# Patient Record
Sex: FEMALE | Race: WHITE | NOT HISPANIC OR LATINO | Employment: PART TIME | URBAN - METROPOLITAN AREA
[De-identification: names, ages, dates, MRNs, and addresses within clinical notes are randomized per-mention and may not be internally consistent; named-entity substitution may affect disease eponyms.]

---

## 2017-01-23 ENCOUNTER — DOCUMENTATION ONLY (OUTPATIENT)
Dept: NEUROLOGY | Facility: CLINIC | Age: 42
End: 2017-01-23

## 2017-01-23 ENCOUNTER — HOSPITAL ENCOUNTER (OUTPATIENT)
Facility: CLINIC | Age: 42
Setting detail: OBSERVATION
Discharge: HOME | End: 2017-01-24
Attending: EMERGENCY MEDICINE | Admitting: INTERNAL MEDICINE
Payer: COMMERCIAL

## 2017-01-23 DIAGNOSIS — G25.3 MYOCLONIC JERKING: ICD-10-CM

## 2017-01-23 LAB
ALBUMIN SERPL-MCNC: 3.9 G/DL (ref 3.4–5)
ALBUMIN UR-MCNC: NEGATIVE MG/DL
ALP SERPL-CCNC: 63 U/L (ref 40–150)
ALT SERPL W P-5'-P-CCNC: 19 U/L (ref 0–50)
AMPHETAMINES UR QL SCN: ABNORMAL
ANION GAP SERPL CALCULATED.3IONS-SCNC: 17 MMOL/L (ref 3–14)
APPEARANCE UR: CLEAR
AST SERPL W P-5'-P-CCNC: 18 U/L (ref 0–45)
BACTERIA #/AREA URNS HPF: ABNORMAL /HPF
BARBITURATES UR QL: ABNORMAL
BASOPHILS # BLD AUTO: 0 10E9/L (ref 0–0.2)
BASOPHILS NFR BLD AUTO: 0.2 %
BENZODIAZ UR QL: ABNORMAL
BILIRUB SERPL-MCNC: 0.3 MG/DL (ref 0.2–1.3)
BILIRUB UR QL STRIP: NEGATIVE
BUN SERPL-MCNC: 15 MG/DL (ref 7–30)
CALCIUM SERPL-MCNC: 8.8 MG/DL (ref 8.5–10.1)
CANNABINOIDS UR QL SCN: ABNORMAL
CHLORIDE SERPL-SCNC: 106 MMOL/L (ref 94–109)
CK SERPL-CCNC: 49 U/L (ref 30–225)
CO2 SERPL-SCNC: 16 MMOL/L (ref 20–32)
COCAINE UR QL: ABNORMAL
COLOR UR AUTO: YELLOW
CREAT SERPL-MCNC: 0.84 MG/DL (ref 0.52–1.04)
DIFFERENTIAL METHOD BLD: NORMAL
EOSINOPHIL # BLD AUTO: 0.1 10E9/L (ref 0–0.7)
EOSINOPHIL NFR BLD AUTO: 1.3 %
ERYTHROCYTE [DISTWIDTH] IN BLOOD BY AUTOMATED COUNT: 12.4 % (ref 10–15)
GFR SERPL CREATININE-BSD FRML MDRD: 75 ML/MIN/1.7M2
GLUCOSE SERPL-MCNC: 87 MG/DL (ref 70–99)
GLUCOSE UR STRIP-MCNC: NEGATIVE MG/DL
HCG SERPL QL: NEGATIVE
HCT VFR BLD AUTO: 43 % (ref 35–47)
HGB BLD-MCNC: 14.8 G/DL (ref 11.7–15.7)
HGB UR QL STRIP: NEGATIVE
IMM GRANULOCYTES # BLD: 0 10E9/L (ref 0–0.4)
IMM GRANULOCYTES NFR BLD: 0.5 %
INTERPRETATION ECG - MUSE: NORMAL
KETONES UR STRIP-MCNC: 10 MG/DL
LACTATE BLD-SCNC: 8.9 MMOL/L (ref 0.7–2.1)
LEUKOCYTE ESTERASE UR QL STRIP: NEGATIVE
LYMPHOCYTES # BLD AUTO: 1.7 10E9/L (ref 0.8–5.3)
LYMPHOCYTES NFR BLD AUTO: 29.7 %
MAGNESIUM SERPL-MCNC: 2.2 MG/DL (ref 1.6–2.3)
MCH RBC QN AUTO: 31 PG (ref 26.5–33)
MCHC RBC AUTO-ENTMCNC: 34.4 G/DL (ref 31.5–36.5)
MCV RBC AUTO: 90 FL (ref 78–100)
MONOCYTES # BLD AUTO: 1.3 10E9/L (ref 0–1.3)
MONOCYTES NFR BLD AUTO: 23.7 %
MUCOUS THREADS #/AREA URNS LPF: PRESENT /LPF
NEUTROPHILS # BLD AUTO: 2.5 10E9/L (ref 1.6–8.3)
NEUTROPHILS NFR BLD AUTO: 44.6 %
NITRATE UR QL: NEGATIVE
NRBC # BLD AUTO: 0 10*3/UL
NRBC BLD AUTO-RTO: 0 /100
OPIATES UR QL SCN: ABNORMAL
PCP UR QL SCN: ABNORMAL
PH UR STRIP: 6 PH (ref 5–7)
PLATELET # BLD AUTO: 222 10E9/L (ref 150–450)
POTASSIUM SERPL-SCNC: 3.4 MMOL/L (ref 3.4–5.3)
PROT SERPL-MCNC: 8 G/DL (ref 6.8–8.8)
RBC # BLD AUTO: 4.78 10E12/L (ref 3.8–5.2)
RBC #/AREA URNS AUTO: 1 /HPF (ref 0–2)
SODIUM SERPL-SCNC: 139 MMOL/L (ref 133–144)
SP GR UR STRIP: 1.02 (ref 1–1.03)
SQUAMOUS #/AREA URNS AUTO: 5 /HPF (ref 0–1)
URN SPEC COLLECT METH UR: ABNORMAL
UROBILINOGEN UR STRIP-MCNC: NORMAL MG/DL (ref 0–2)
WBC # BLD AUTO: 5.6 10E9/L (ref 4–11)
WBC #/AREA URNS AUTO: <1 /HPF (ref 0–2)

## 2017-01-23 PROCEDURE — 80307 DRUG TEST PRSMV CHEM ANLYZR: CPT | Performed by: EMERGENCY MEDICINE

## 2017-01-23 PROCEDURE — 40000141 ZZH STATISTIC PERIPHERAL IV START W/O US GUIDANCE

## 2017-01-23 PROCEDURE — 96376 TX/PRO/DX INJ SAME DRUG ADON: CPT

## 2017-01-23 PROCEDURE — 96361 HYDRATE IV INFUSION ADD-ON: CPT

## 2017-01-23 PROCEDURE — 25000128 H RX IP 250 OP 636: Performed by: EMERGENCY MEDICINE

## 2017-01-23 PROCEDURE — 83735 ASSAY OF MAGNESIUM: CPT | Performed by: EMERGENCY MEDICINE

## 2017-01-23 PROCEDURE — 81001 URINALYSIS AUTO W/SCOPE: CPT | Performed by: EMERGENCY MEDICINE

## 2017-01-23 PROCEDURE — 82550 ASSAY OF CK (CPK): CPT | Performed by: EMERGENCY MEDICINE

## 2017-01-23 PROCEDURE — 99285 EMERGENCY DEPT VISIT HI MDM: CPT | Mod: 25

## 2017-01-23 PROCEDURE — 96374 THER/PROPH/DIAG INJ IV PUSH: CPT

## 2017-01-23 PROCEDURE — 82085 ASSAY OF ALDOLASE: CPT | Performed by: EMERGENCY MEDICINE

## 2017-01-23 PROCEDURE — G0378 HOSPITAL OBSERVATION PER HR: HCPCS

## 2017-01-23 PROCEDURE — 83605 ASSAY OF LACTIC ACID: CPT | Performed by: EMERGENCY MEDICINE

## 2017-01-23 PROCEDURE — 25000125 ZZHC RX 250: Performed by: INTERNAL MEDICINE

## 2017-01-23 PROCEDURE — 93005 ELECTROCARDIOGRAM TRACING: CPT

## 2017-01-23 PROCEDURE — 80053 COMPREHEN METABOLIC PANEL: CPT | Performed by: EMERGENCY MEDICINE

## 2017-01-23 PROCEDURE — 85025 COMPLETE CBC W/AUTO DIFF WBC: CPT | Performed by: EMERGENCY MEDICINE

## 2017-01-23 PROCEDURE — 84703 CHORIONIC GONADOTROPIN ASSAY: CPT | Performed by: EMERGENCY MEDICINE

## 2017-01-23 PROCEDURE — 25000125 ZZHC RX 250: Performed by: EMERGENCY MEDICINE

## 2017-01-23 PROCEDURE — 25000132 ZZH RX MED GY IP 250 OP 250 PS 637: Performed by: INTERNAL MEDICINE

## 2017-01-23 PROCEDURE — 99220 ZZC INITIAL OBSERVATION CARE,LEVL III: CPT | Performed by: INTERNAL MEDICINE

## 2017-01-23 RX ORDER — BUPROPION HYDROCHLORIDE 75 MG/1
75 TABLET ORAL 2 TIMES DAILY
Status: DISCONTINUED | OUTPATIENT
Start: 2017-01-23 | End: 2017-01-24 | Stop reason: HOSPADM

## 2017-01-23 RX ORDER — ACETAMINOPHEN 325 MG/1
650 TABLET ORAL EVERY 4 HOURS PRN
Status: DISCONTINUED | OUTPATIENT
Start: 2017-01-23 | End: 2017-01-24 | Stop reason: HOSPADM

## 2017-01-23 RX ORDER — SODIUM CHLORIDE 9 MG/ML
INJECTION, SOLUTION INTRAVENOUS CONTINUOUS
Status: DISCONTINUED | OUTPATIENT
Start: 2017-01-23 | End: 2017-01-24 | Stop reason: HOSPADM

## 2017-01-23 RX ORDER — LORAZEPAM 2 MG/ML
0.5 INJECTION INTRAMUSCULAR ONCE
Status: COMPLETED | OUTPATIENT
Start: 2017-01-23 | End: 2017-01-23

## 2017-01-23 RX ORDER — LIDOCAINE 40 MG/G
CREAM TOPICAL
Status: DISCONTINUED | OUTPATIENT
Start: 2017-01-23 | End: 2017-01-24 | Stop reason: HOSPADM

## 2017-01-23 RX ORDER — LORAZEPAM 2 MG/ML
1 INJECTION INTRAMUSCULAR ONCE
Status: COMPLETED | OUTPATIENT
Start: 2017-01-23 | End: 2017-01-23

## 2017-01-23 RX ORDER — NALOXONE HYDROCHLORIDE 0.4 MG/ML
.1-.4 INJECTION, SOLUTION INTRAMUSCULAR; INTRAVENOUS; SUBCUTANEOUS
Status: DISCONTINUED | OUTPATIENT
Start: 2017-01-23 | End: 2017-01-24 | Stop reason: HOSPADM

## 2017-01-23 RX ORDER — LORAZEPAM 0.5 MG/1
.5-1 TABLET ORAL EVERY 4 HOURS PRN
Status: DISCONTINUED | OUTPATIENT
Start: 2017-01-23 | End: 2017-01-24 | Stop reason: HOSPADM

## 2017-01-23 RX ADMIN — LORAZEPAM 0.5 MG: 2 INJECTION INTRAMUSCULAR; INTRAVENOUS at 11:17

## 2017-01-23 RX ADMIN — SODIUM CHLORIDE 1000 ML: 9 INJECTION, SOLUTION INTRAVENOUS at 11:04

## 2017-01-23 RX ADMIN — LORAZEPAM 0.5 MG: 2 INJECTION INTRAMUSCULAR; INTRAVENOUS at 10:54

## 2017-01-23 RX ADMIN — ACETAMINOPHEN 650 MG: 325 TABLET, FILM COATED ORAL at 18:08

## 2017-01-23 RX ADMIN — LORAZEPAM 0.5 MG: 0.5 TABLET ORAL at 18:08

## 2017-01-23 RX ADMIN — LORAZEPAM 0.5 MG: 2 INJECTION INTRAMUSCULAR; INTRAVENOUS at 12:09

## 2017-01-23 RX ADMIN — LORAZEPAM 1 MG: 2 INJECTION INTRAMUSCULAR; INTRAVENOUS at 10:40

## 2017-01-23 RX ADMIN — LIDOCAINE: 40 CREAM TOPICAL at 14:19

## 2017-01-23 ASSESSMENT — ENCOUNTER SYMPTOMS
LIGHT-HEADEDNESS: 1
DIARRHEA: 0
TREMORS: 1
ABDOMINAL PAIN: 0
COUGH: 0
NAUSEA: 0
SHORTNESS OF BREATH: 0
HEADACHES: 0
FEVER: 0
VOMITING: 0

## 2017-01-23 ASSESSMENT — VISUAL ACUITY
OU: DOUBLE VISION/DIPLOPIA

## 2017-01-23 NOTE — CONSULTS
REQUESTING PHYSICIAN:  None stated.       CHIEF COMPLAINT:  Evaluate for body tremors, rule out seizures.      HISTORY OF PRESENT ILLNESS:  Ms. Dae Castañeda is a 41-year-old right-handed lady who presents for uncontrollable body movements which started to occur today.  This is not the first time she has had symptoms like that.  Over the last few days she had cold and sore throat and low-grade fever.  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  She now continues to have spells.  She is awake and talking to me as she is having intermittent body shivering including more of her legs and occasionally arms and occasionally movements of her head.      She states that she does not lose consciousness and does not become disoriented, does not bite her tongue.      Previously she had similar spells when she was working out.  Then she was taking Paxil.  She had been admitted to our hospital in 05/2015 and had EEG video monitoring for the same symptoms and did appear to have nonepileptic spells.  She had an MRI of the brain in 2015 which, as well, has been normal.  She had been seen by neurologist, Dr. Ramsey Hines, who recommended that she would have drawing of her CPK and aldolase if she had spells like that in the future.  There was never a diagnosis of seizure disorder.  Looking at evaluation in Neurology in our hospital in 05/2015, nonepileptic spells where seen.  The patient essentially is having the same symptoms.  Today she is slightly hypotensive, afebrile and she does have a slightly elevated lactate.      CURRENT MEDICATIONS:  Wellbutrin 75 mg twice a day.        SOCIAL HISTORY:  Nondrinker, nonsmoker.  She is a school paraprofessional.        FAMILY HISTORY:  Noncontributory to this presentation.      ALLERGIES:  Codeine, venlafaxine and Paxil.      REVIEW OF  ORGANS AND SYSTEMS:  Positive for fibromyalgia and symptoms of depression.  She does take bupropion for that.      Review of organs and systems shows for neck pain and migrating body pain related to fibromyalgia.      CURRENT PHYSICAL EXAMINATION:   VITAL SIGNS:  Show a blood pressure of 95/65, respiratory rate is 12, heart rate of 105-112.   GENERAL:  The patient is in no acute distress, but she does have intermittent body tremors.   NEUROLOGIC:  She is awake and alert x3 with clear speech and language function.  Mental status is intact.  Motor examination shows symmetrical bulk and tone with no evidence of focal weakness.  Reflexes are symmetrical.  Toes are downgoing.  Sensory examination is intact to pinprick and vibration.  Coordination is intact to finger-nose-finger.      IMPRESSION:  This lady presents with episodes of body tremors which appeared to be intentional.  I do not see any evidence of seizures.  This is a recurrent problem that had been happening before.  The presentation clinically highly suggests anxiety, panic attack or nonepileptic spells.  Neurologically, I will do CPK and aldolase which was mentioned by Dr. Ramsey Hines who patient sees in outpatient Neurology.  We most likely will see elevation of CPK, as patient having active body movements.      I do recommend that the patient will receive Ativan, per dosing by the Hospitalist.  She will have increase in blood pressure with IV hydration, and I do recommend Psychiatry consult.      After review of the chart and clinical history, this is not presentation due to neurological condition, and I would recommend mental health providers to be involved here.  I discussed all the above with the patient and her .  I will plan to follow on the lab results.         CARINA HILTON MD             D: 01/23/2017 14:36   T: 01/23/2017 15:29   MT: HARDEEP      Name:     GARDENIA WISEMAN   MRN:      [id number]        Account:        [id number]   :      1975           Consult Date:  2017      Document: [id number]

## 2017-01-23 NOTE — CONSULTS
Note is dictated.    Pt. Presents with intentional body shaking which happened many times before with different reasons like working out, taking Paxil.    She recently had a clod and now she has low BP.    I personally observed episodes - they are intentions.    I do not see Sz.    Pt. Previously had nl EEG.    I rec to cancel EEG, consider hydration and psychiatry consult to work on anxiety.    Ativan can help now.

## 2017-01-23 NOTE — IP AVS SNAPSHOT
"    [facility]: [phone]                                              INTERAGENCY TRANSFER FORM - LAB / IMAGING / EKG / EMG RESULTS   2017                    Hospital Admission Date: 2017  GARDENIA YEE   : 1975  Sex: Female        Attending Provider: Nataliya Cooper MD     Allergies:  Codeine, Paroxetine, Venlafaxine    Infection:  None   Service:  HOSPITALIST    Ht:  1.626 m (5' 4\")   Wt:  --   Admission Wt:  --    BMI:  --   BSA:  --            Patient PCP Information     Provider PCP Type    Pepe Issa MD General         Lab Results - 3 Days (17 - 17)      CK total [[id number]]  Resulted: 17, Result status: Final result    Ordering provider: Jody Radford MD  17 0000 Resulting lab: [facility]    Specimen Information    Type Source Collected On   Blood  17 0740          Components       Value Reference Range Flag Lab   CK Total 78 30 - 225 U/L  FrStHsLb            Basic metabolic panel [[id number]] (Abnormal)  Resulted: 17 0816, Result status: Final result    Ordering provider: Nataliya Cooper MD  17 0000 Resulting lab: [facility]    Specimen Information    Type Source Collected On   Blood  17 0740          Components       Value Reference Range Flag Lab   Sodium 141 133 - 144 mmol/L  FrStHsLb   Potassium 3.7 3.4 - 5.3 mmol/L  FrStHsLb   Chloride 110 94 - 109 mmol/L H FrStHsLb   Carbon Dioxide 21 20 - 32 mmol/L  FrStHsLb   Anion Gap 10 3 - 14 mmol/L  FrStHsLb   Glucose 79 70 - 99 mg/dL  FrStHsLb   Urea Nitrogen 8 7 - 30 mg/dL  FrStHsLb   Creatinine 0.71 0.52 - 1.04 mg/dL  FrStHsLb   GFR Estimate -- >60 mL/min/1.7m2  FrStHsLb   Result:         >90  Non  GFR Calc     GFR Estimate If Black -- >60 mL/min/1.7m2  FrStHsLb   Result:         >90   GFR Calc     Calcium 8.0 8.5 - 10.1 mg/dL L FrStHsLb   Result:            "   Lactic acid whole blood [[id number]] (Abnormal)  Resulted: 01/24/17 0807, Result status: Final result    Ordering provider: Nataliya Cooper MD  01/24/17 0000 Resulting lab: [facility]    Specimen Information    Type Source Collected On   Blood  01/24/17 0740          Components       Value Reference Range Flag Lab   Lactic Acid 0.5 0.7 - 2.1 mmol/L L FrStHsLb            Aldolase [[id number]]  Resulted: 01/24/17 0751, Result status: In process    Ordering provider: Jody Radford MD  01/24/17 0000 Resulting lab: [facility]    Specimen Information    Type Source Collected On   Blood  01/24/17 0740            Drug abuse screen urine [[id number]] (Abnormal)  Resulted: 01/23/17 1841, Result status: Final result    Ordering provider: Martin Ybarra MD  01/23/17 1044 Resulting lab: [facility]    Specimen Information    Type Source Collected On   Urine Urine clean catch 01/23/17 1800          Components       Value Reference Range Flag Lab   Amphetamine Qual Urine -- NEG   FrStHsLb   Result:         Negative   Cutoff for a negative amphetamine is 500 ng/mL or less.     Barbiturates Qual Urine -- NEG   FrStHsLb   Result:         Negative   Cutoff for a negative barbiturate is 200 ng/mL or less.     Benzodiazepine Qual Urine -- NEG  A FrStHsLb   Result:         Positive   Cutoff for a positive benzodiazepine is greater than 200 ng/mL. This is an   unconfirmed screening result to be used for medical purposes only.     Cannabinoids Qual Urine -- NEG   FrStHsLb   Result:         Negative   Cutoff for a negative cannabinoid is 50 ng/mL or less.     Cocaine Qual Urine -- NEG   FrStHsLb   Result:         Negative   Cutoff for a negative cocaine is 300 ng/mL or less.     Opiates Qualitative Urine -- NEG   FrStHsLb   Result:         Negative   Cutoff for a negative opiate is 300 ng/mL or less.     PCP Qual Urine -- NEG   FrStHsLb   Result:         Negative   Cutoff for a negative PCP is 25  ng/mL or less.              UA with Microscopic [[id number]] (Abnormal)  Resulted: 01/23/17 1830, Result status: Final result    Ordering provider: Martin Ybarra MD  01/23/17 1044 Resulting lab: [facility]    Specimen Information    Type Source Collected On   Urine Urine clean catch 01/23/17 1800          Components       Value Reference Range Flag Lab   Color Urine Yellow   FrStHsLb   Appearance Urine Clear   FrStHsLb   Glucose Urine Negative NEG mg/dL  FrStHsLb   Bilirubin Urine Negative NEG   FrStHsLb   Ketones Urine 10 NEG mg/dL A FrStHsLb   Specific Harmony Urine 1.018 1.003 - 1.035   FrStHsLb   Blood Urine Negative NEG   FrStHsLb   pH Urine 6.0 5.0 - 7.0 pH  FrStHsLb   Protein Albumin Urine Negative NEG mg/dL  FrStHsLb   Urobilinogen mg/dL Normal 0.0 - 2.0 mg/dL  FrStHsLb   Nitrite Urine Negative NEG   FrStHsLb   Leukocyte Esterase Urine Negative NEG   FrStHsLb   Source Midstream Urine   FrStHsLb   WBC Urine <1 0 - 2 /HPF  FrStHsLb   RBC Urine 1 0 - 2 /HPF  FrStHsLb   Bacteria Urine Few NEG /HPF A FrStHsLb   Squamous Epithelial /HPF Urine 5 0 - 1 /HPF H FrStHsLb   Mucous Urine Present NEG /LPF A FrStHsLb            Aldolase [[id number]]  Resulted: 01/23/17 1633, Result status: In process    Ordering provider: Martin Ybarra MD  01/23/17 1048 Resulting lab: [facility]    Specimen Information    Type Source Collected On     01/23/17 1048            CBC with platelets + differential [[id number]]  Resulted: 01/23/17 1148, Result status: Final result    Ordering provider: Martin Ybarra MD  01/23/17 1044 Resulting lab: [facility]    Specimen Information    Type Source Collected On   Blood  01/23/17 1048          Components       Value Reference Range Flag Lab   WBC 5.6 4.0 - 11.0 10e9/L  FrStHsLb   RBC Count 4.78 3.8 - 5.2 10e12/L  FrStHsLb   Hemoglobin 14.8 11.7 - 15.7 g/dL  FrStHsLb   Hematocrit 43.0 35.0 - 47.0 %  FrStHsLb   MCV 90 78 - 100 fl  FrStHsLb   MCH 31.0 26.5 -  33.0 pg  FrStHsLb   MCHC 34.4 31.5 - 36.5 g/dL  FrStHsLb   RDW 12.4 10.0 - 15.0 %  FrStHsLb   Platelet Count 222 150 - 450 10e9/L  FrStHsLb   Diff Method Automated Method   FrStHsLb   % Neutrophils 44.6 %  FrStHsLb   % Lymphocytes 29.7 %  FrStHsLb   % Monocytes 23.7 %  FrStHsLb   % Eosinophils 1.3 %  FrStHsLb   % Basophils 0.2 %  FrStHsLb   % Immature Granulocytes 0.5 %  FrStHsLb   Nucleated RBCs 0 0 /100  FrStHsLb   Absolute Neutrophil 2.5 1.6 - 8.3 10e9/L  FrStHsLb   Absolute Lymphocytes 1.7 0.8 - 5.3 10e9/L  FrStHsLb   Absolute Monocytes 1.3 0.0 - 1.3 10e9/L  FrStHsLb   Absolute Eosinophils 0.1 0.0 - 0.7 10e9/L  FrStHsLb   Absolute Basophils 0.0 0.0 - 0.2 10e9/L  FrStHsLb   Abs Immature Granulocytes 0.0 0 - 0.4 10e9/L  FrStHsLb   Absolute Nucleated RBC 0.0   FrStHsLb            Comprehensive metabolic panel [[id number]] (Abnormal)  Resulted: 01/23/17 1117, Result status: Final result    Ordering provider: Martin Ybarra MD  01/23/17 1044 Resulting lab: [facility]    Specimen Information    Type Source Collected On   Blood  01/23/17 1048          Components       Value Reference Range Flag Lab   Sodium 139 133 - 144 mmol/L  FrStHsLb   Potassium 3.4 3.4 - 5.3 mmol/L  FrStHsLb   Chloride 106 94 - 109 mmol/L  FrStHsLb   Carbon Dioxide 16 20 - 32 mmol/L L FrStHsLb   Anion Gap 17 3 - 14 mmol/L H FrStHsLb   Glucose 87 70 - 99 mg/dL  FrStHsLb   Urea Nitrogen 15 7 - 30 mg/dL  FrStHsLb   Creatinine 0.84 0.52 - 1.04 mg/dL  FrStHsLb   GFR Estimate 75 >60 mL/min/1.7m2  FrStHsLb   Comment:  Non  GFR Calc   GFR Estimate If Black -- >60 mL/min/1.7m2  FrStHsLb   Result:         >90   GFR Calc     Calcium 8.8 8.5 - 10.1 mg/dL  FrStHsLb   Result:     Bilirubin Total 0.3 0.2 - 1.3 mg/dL  FrStHsLb   Albumin 3.9 3.4 - 5.0 g/dL  FrStHsLb   Protein Total 8.0 6.8 - 8.8 g/dL  FrStHsLb   Alkaline Phosphatase 63 40 - 150 U/L  FrStHsLb   ALT 19 0 - 50 U/L  FrStHsLb   AST 18 0 - 45 U/L   FrStHsLb            CK total [[id number]]  Resulted: 01/23/17 1117, Result status: Final result    Ordering provider: Martin Ybarra MD  01/23/17 1044 Resulting lab: [facility]    Specimen Information    Type Source Collected On   Blood  01/23/17 1048          Components       Value Reference Range Flag Lab   CK Total 49 30 - 225 U/L  FrStHsLb            Magnesium [[id number]]  Resulted: 01/23/17 1115, Result status: Final result    Ordering provider: Martin Ybarra MD  01/23/17 1049 Resulting lab: [facility]    Specimen Information    Type Source Collected On   Blood  01/23/17 1048          Components       Value Reference Range Flag Lab   Magnesium 2.2 1.6 - 2.3 mg/dL  FrStHsLb            HCG QUALitative pregnancy (blood) [[id number]]  Resulted: 01/23/17 1109, Result status: Final result    Ordering provider: Martin Ybarra MD  01/23/17 1044 Resulting lab: [facility]    Specimen Information    Type Source Collected On   Blood  01/23/17 1048          Components       Value Reference Range Flag Lab   HCG Qualitative Serum Negative NEG   FrStHsLb            Lactic acid [[id number]] (Abnormal)  Resulted: 01/23/17 1058, Result status: Final result    Ordering provider: Martin Ybarra MD  01/23/17 1047 Resulting lab: [facility]    Specimen Information    Type Source Collected On   Blood  01/23/17 1048          Components       Value Reference Range Flag Lab   Lactic Acid 8.9 0.7 - 2.1 mmol/L  FrStHsLb   Comment:         Critical Value called to and read back by   IN ER AT 1057 HBS              Testing Performed By     Lab - Abbreviation Name Director Address Valid Date Range    14 - FrStHsLb [facility] Unknown [address] [place] [address] 05/08/15 1057 - Present    45 - [facility] Unknown Unknown 01/28/02 0000 - Present            Unresulted Labs     None      Encounter-Level Documents:     There  are no encounter-level documents.      Order-Level Documents:     There are no order-level documents.

## 2017-01-23 NOTE — PHARMACY-ADMISSION MEDICATION HISTORY
Admission medication history interview status for the 1/23/2017  admission is complete. See EPIC admission navigator for prior to admission medications     Medication history source reliability:Good    Actions taken by pharmacist (provider contacted, etc):interviewed pt w/     Additional medication history information not noted on PTA med list :None    Medication reconciliation/reorder completed by provider prior to medication history? No    Time spent in this activity: 10 min    Prior to Admission medications    Medication Sig Last Dose Taking? Auth Provider   IBUPROFEN PO Take 400-800 mg by mouth daily as needed for moderate pain or fever   Yes Unknown, Entered By History   tiZANidine (ZANAFLEX) 2 MG tablet Take 1 tablet (2 mg) by mouth 2 times daily as needed for muscle spasms Past Month at Unknown time Yes Pepe Issa MD   buPROPion (WELLBUTRIN) 75 MG tablet TAKE 1 TABLET (75 MG) BY MOUTH 2 TIMES DAILY 1/23/2017 at am Yes Pepe Issa MD

## 2017-01-23 NOTE — LETTER
Transition Communication Hand-off for Care Transitions to Next Level of Care Provider    Name: Dae Castañeda  MRN #: [id number]  Primary Care Provider: Pepe Issa     Primary Clinic: [facility] [place] [address] [place] [address] [address]  [place] [address]     Reason for Hospitalization:  Myoclonic jerking [G25.3]  Admit Date/Time: 1/23/2017 10:39 AM  Discharge Date: 1/24/2017    Payor Source: Payor: MEDICA / Plan: MEDICA CHOICE [place] CARE / Product Type: HMO /       Plan of Care Goals/Milestone Events:   Patient Concerns: admitted with nonepileptic spells.    Patient Goals:   Short-term return back to baseline.             Reason for Communication Hand-off Referral: Multiple providers/specialties  Non-adherence to plan of care related to:  Other doesn't want to follow up with psych.    Discharge Plan:       Concern for non-adherence with plan of care:   Y/N yes  Discharge Needs Assessment:        Follow-up plan:  pt wanted to make her own appt.            Key Recommendations:  Pt admitted with nonepileptic spells. Psych consulted. Pt upset about psych consult. Pt adamant about making own appt. Please follow up with pt in a few days.     Ella Soria    AVS/Discharge Summary is the source of truth; this is a helpful guide for improved communication of patient story

## 2017-01-23 NOTE — IP AVS SNAPSHOT
MRN:[id number]                      After Visit Summary   1/23/2017    Dae Castañeda    MRN: [id number]           Thank you!     Thank you for choosing [facility] for your care. Our goal is always to provide you with excellent care. Hearing back from our patients is one way we can continue to improve our services. Please take a few minutes to complete the written survey that you may receive in the mail after you visit with us. Thank you!        Patient Information     Date Of Birth          1975        About your hospital stay     You were admitted on:  January 23, 2017 You last received care in the:  [facility] [address]    You were discharged on:  January 24, 2017        Reason for your hospital stay       Involuntary movement of extremities                  Who to Call     For medical emergencies, please call 911.  For non-urgent questions about your medical care, please call your primary care provider or clinic, [phone]          Attending Provider     Provider    Martin Ybarra MD Lindeke, Elizabeth A, MD       Primary Care Provider Office Phone # Fax #    Pepe Issa [phone]       [facility] [place] [address]  [place] [address]        After Care Instructions     Activity       Your activity upon discharge: activity as tolerated            Diet       Follow this diet upon discharge: Orders Placed This Encounter  Regular Diet Adult                  Follow-up Appointments     Follow-up and recommended labs and tests        Follow up with primary care provider, Pepe Issa, within 7 days for hospital follow- up.  No follow up labs or test are needed.  F/U with your regular neurologist and psychaitrist next available                  Pending Results     Date and Time Order Name Status Description    1/24/2017 0000 Aldolase In process     1/23/2017 1048 Aldolase In process             Statement of  "Approval     Ordered          01/24/17 1704  I have reviewed and agree with all the recommendations and orders detailed in this document.   EFFECTIVE NOW     Approved and electronically signed by:  Radha Cotter MD             Admission Information        Provider Department Dept Phone    1/23/2017 Nataliya Cooper MD  [facility] [phone]      Your Vitals Were     Blood Pressure Pulse Temperature Respirations Height Pulse Oximetry    90/57 mmHg 83 98  F (36.7  C) (Oral) 16 1.626 m (5' 4\") 97%    Last Period                   01/09/2017           MyChart Information     [employer] gives you secure access to your electronic health record. If you see a primary care provider, you can also send messages to your care team and make appointments. If you have questions, please call your primary care clinic.  If you do not have a primary care provider, please call [phone] and they will assist you.        Care EveryWhere ID     This is your Care EveryWhere ID. This could be used by other organizations to access your [facility] medical records  [id number]           Review of your medicines      CONTINUE these medicines which have NOT CHANGED        Dose / Directions    buPROPion 75 MG tablet   Commonly known as:  WELLBUTRIN   Used for:  Major depressive disorder, single episode, mild (H)        TAKE 1 TABLET (75 MG) BY MOUTH 2 TIMES DAILY   Quantity:  180 tablet   Refills:  1       IBUPROFEN PO        Dose:  400-800 mg   Take 400-800 mg by mouth daily as needed for moderate pain or fever   Refills:  0       tiZANidine 2 MG tablet   Commonly known as:  ZANAFLEX   Used for:  Acute midline thoracic back pain        Dose:  2 mg   Take 1 tablet (2 mg) by mouth 2 times daily as needed for muscle spasms   Quantity:  30 tablet   Refills:  0                Protect others around you: Learn how to safely use, store and throw away your medicines at www.disposemymeds.org.             Medication List: This is a list of " all your medications and when to take them. Check marks below indicate your daily home schedule. Keep this list as a reference.      Medications           Morning Afternoon Evening Bedtime As Needed    buPROPion 75 MG tablet   Commonly known as:  WELLBUTRIN   TAKE 1 TABLET (75 MG) BY MOUTH 2 TIMES DAILY   Last time this was given:  75 mg on 1/24/2017  9:27 AM   Next Dose Due:  This evening 1/24/17                                      IBUPROFEN PO   Take 400-800 mg by mouth daily as needed for moderate pain or fever   Next Dose Due:  Resume as taken prior to hospitalization                                   tiZANidine 2 MG tablet   Commonly known as:  ZANAFLEX   Take 1 tablet (2 mg) by mouth 2 times daily as needed for muscle spasms   Next Dose Due:  Resume as taken prior to hospitalization

## 2017-01-23 NOTE — ED NOTES
[facility]  ED Nurse Handoff Report    ED Chief complaint: Shaking      ED Diagnosis:   Final diagnoses:   Myoclonic jerking       Code Status: Full Code    Allergies:   Allergies   Allergen Reactions     Codeine GI Disturbance     Paroxetine      serotonin syndrome, all seratonin drugs     Venlafaxine      serotonin syndrome       Activity level:  Independent     Needed?: No    Isolation: No  Infection: Not Applicable    Bariatric?: No      Vital Signs:   Filed Vitals:    01/23/17 1200 01/23/17 1215 01/23/17 1230 01/23/17 1245   BP: 105/60 95/59 102/52 92/69   Pulse:       Temp:       TempSrc:       Resp:       Height:       SpO2: 95% 94% 95% 94%       Cardiac Rhythm: ,        Pain level:      Is this patient confused?: No    Patient Report: Initial Complaint: Serotonin syndrome was brought in by EMS shacking; she was at work and felt faint and developed shaking in legs   Focused Assessment: pt shaking kicking and swing arms. Pt was able to speak  Tests Performed: blood CT  Abnormal Results: no; pt has had these symptoms before; neuro has follow up   Treatments provided: observation and ativan    Family Comments:  @ bedside    OBS brochure/video discussed/provided to patient: Yes    ED Medications:   Medications   0.9% sodium chloride BOLUS (not administered)   LORazepam (ATIVAN) injection 1 mg (1 mg Intravenous Given 1/23/17 1040)   LORazepam (ATIVAN) injection 0.5 mg (0.5 mg Intravenous Given 1/23/17 1054)   0.9% sodium chloride BOLUS (0 mLs Intravenous Stopped 1/23/17 1258)   LORazepam (ATIVAN) injection 0.5 mg (0.5 mg Intravenous Given 1/23/17 1117)   LORazepam (ATIVAN) injection 0.5 mg (0.5 mg Intravenous Given 1/23/17 1209)       Drips infusing?:  No      ED NURSE PHONE NUMBER: [phone]

## 2017-01-23 NOTE — ED PROVIDER NOTES
History     Chief Complaint:  Convulsions     HPI   Niomi P Haroon Castañeda is a 41 year old female with a medical history significant for undiagnosed Seratonin Syndrome and fibromyalgia, who presents after getting over a cold this weekend and awaking this morning slightly lightheaded. She subsequently went to work and continued to feel lightheaded and acutely became faint. Approximately 30 seconds after feeling faint she developed severe spasms in her legs. Soon afterwards she developed whole body convulsions and hyperventilation. She was slowly lowered to the ground during this time and 911 was called. While en route to the emergency department the patient received Versed. On evaluation, the patient states that she generally has an arm twitch at night but her symptoms today are much more severe than usual. To note, the patient was last seen by her neurologist a couple months ago and states that she developed these symptoms after discontinuing Paxil cold turkey. The patient does not report any head injury, loss of consciousness, diarrhea, headache, nausea, vomiting, or other related symptoms. She voices no other concerns at this time.     Allergies:  Codeine   Paroxetine   Venlafaxine       Medications:    Wellbutrin  Zanaflex   Differin       Past Medical History:    Fibromyalgia   Depressive disorder   Serotonin syndrome      Past Surgical History:    Repair nasal septum   Aspiration and curettage   Laparoscopy      Family History:    Hyperlipidemia (mother)  Diabetes and CAD (maternal grandmother)  Cancer (paternal grandmother)    Social History:  The patient is not a smoker. The patient uses alcohol.   Marital Status:   [2]     Review of Systems   Constitutional: Negative for fever.   Respiratory: Negative for cough and shortness of breath.    Cardiovascular: Negative for chest pain.   Gastrointestinal: Negative for nausea, vomiting, abdominal pain and diarrhea.   Neurological: Positive for tremors  "and light-headedness. Negative for syncope and headaches.   All other systems reviewed and are negative.      Physical Exam     Patient Vitals for the past 24 hrs:   BP Temp Temp src Pulse Resp SpO2 Height   01/23/17 1118 (!) 79/56 mmHg - - - - 97 % -   01/23/17 1115 (!) 81/55 mmHg - - - - 90 % -   01/23/17 1105 - - - - - 94 % -   01/23/17 1100 98/52 mmHg - - 105 16 94 % -   01/23/17 1054 - - - - - 97 % -   01/23/17 1050 - - - - - 98 % -   01/23/17 1045 96/63 mmHg - - 112 - 98 % -   01/23/17 1042 105/63 mmHg 98.2  F (36.8  C) Oral 111 22 97 % 1.626 m (5' 4\")   01/23/17 1041 105/63 mmHg - - - - - -           Physical Exam  General: Appears well-developed and well-nourished.   Head: No signs of trauma.   Mouth/Throat: Oropharynx is clear and moist.   Eyes: Conjunctivae are normal. Pupils are equal, round, and reactive to light.   Neck: Normal range of motion. No nuchal rigidity. No cervical adenopathy  CV: Mild tachycardia and regular rhythm.    Resp: Effort normal and breath sounds normal. No respiratory distress.   GI: Soft. There is no tenderness or guarding.  Normal bowel sounds.  No CVA tenderness.  MSK: Normal range of motion. no edema. No Calf tenderness.  Neuro: The patient is alert and oriented to person, place, and time.  PERRLA, EOMI, strength in upper/lower extremities normal and symmetrical.   Sensation normal. Speech normal.  Intermittent significant jerking of extremities and head.    GCS 15  Skin: Skin is warm and dry. No rash noted.   Psych: normal mood and affect. behavior is normal.       Emergency Department Course   ECG (11:05:53):  Rate 103 bpm. KY interval 122. QRS duration 74. QT/QTc 382/500. P-R-T axes 64/78/61. Sinus tachycardia nonspecific T wave abnormality. Interpreted at 1109 by Martin Ybarra MD.     Laboratory:  CBC: WNL (WBC 5.6, HGB 14.8, )   CMP: WNL (Creatinine 0.84)  CK: 49 (WNL)  HCG qualitative: negative   Lactic acid: 8.9 (HH)  Magnesium: 2.2 " (WNL)    Interventions:  1158 Ativan 0.5 mg IV   1118 Ativan 0.5 mg IV   1054 Ativan 1.0 mg IV   1054 Ativan 0.5 mg IV   Normal Saline Bolus 1L    Emergency Department Course:  Past medical records, nursing notes, and vitals reviewed. I performed an exam of the patient and obtained history, as documented above. Blood was obtained. A peripheral IV was established.     1213: I spoke with Dr. Jasmine of Neurology who recommended the patient be admitted for observation    1231: I spoke with Dr. Cooper of the hospitalist service     Findings and plan explained to the Patient who consents to admission. Discussed the patient with Dr. Cooper, who will admit the patient to a obs bed for further monitoring, evaluation, and treatment.   Impression & Plan      Medical Decision Making:  Dae Castañeda is a 41 year old female who presents due to episodes of jerking. She has had this sporadically over a number of years. At one point she had been told it could be serotonin syndrome but it had thought to not be related to that. During my evaluation, she did have repeated episodes of jerking. She remained alert and conversing throughout this and it does not appear consistent with a seizure activity. In some ways the movements seem almost volitional.  With deep breathing the symptoms would improve. The patient was given 2.5 mg of Ativan and had overall improvement in her symptoms as well. Basic blood work was obtained and non concerning. Lactic acid did come back significantly elevated although this would be expected with the repeated flexion and jerking of the muscles. I do not believe that she is septic at this time. The patient did have a note from her Neurologist requesting a CK be obtained which came back normal. There was another test that was requested as well that was difficult to make out based on the handwriting but was though to potentially be aldolase which was ordered as well. I did speak with Dr. Jasmine of  the neurology service who was familiar of the patient who recommended admission for further monitoring. The patient was admitted to Dr. Hendrix of the hospitalist service.     Diagnosis:    ICD-10-CM    1. Myoclonic jerking G25.3         Disposition:  Admitted to the hospitalist service under an obs bed    Erin ESTEVEZ, am serving as a scribe at 10:40 AM on 1/23/2017 to document services personally performed by Martin Ybarra MD based on my observations and the provider's statements to me.            Martin Ybarra MD  01/23/17 5420

## 2017-01-23 NOTE — H&P
"PRIMARY CARE PHYSICIAN:  Dr. Issa.      PRIMARY NEUROLOGIST:  Dr. Mustafa.      CHIEF COMPLAINT:  Myoclonic jerks.      HISTORY OF PRESENT ILLNESS:  Dae Castañeda is a 41-year-old female with a past medical history of depression and serotonin syndrome.  Her serotonin syndrome presented when she was 21 years old with involuntary movements.  The patient says that she has had multiple episodes of serotonin syndrome.  This can be caused by medications that affected her serotonin levels.  She has also had numerous admissions for myoclonic jerks, the last one being in 05/2015.  During that admission, the patient had a negative EEG for epileptiform activities.  She also had an MRI of the brain with and without contrast so was a normal study.  The patient has followed up with Dr. Mustafa from [facility].  The cause of the patient's myoclonic jerks is unknown.  In the past she did notice that she had it more when she had exercise.  She also has had an episode of serotonin syndrome according to the patient when she had stopped her Paxil \"cold turkey.\"      The patient says that she has had a sinus infection over the weekend, Friday night or 3 days prior to admission, the patient started having her sinus symptoms and Saturday she was in bed most of the day with decreased p.o. intake and then yesterday or Sunday, the patient had increase of her appetite and felt better.  She went to work this morning and then after a little bit at work, she felt lightheaded.  She went out into the hallway and then felt like she was going to faint and she started having some myotonic jerks and because of this, she was brought to the emergency room by ambulance.  In the emergency room, the patient has had some Ambien, which has helped.  The patient has had an elevated lactic acid that this also was elevated when she was initially seen for her last hospitalization in 05/2015 and this resolved with fluids.  Her lactic " acid was 8.9.      The patient was sent up to the Neurology floor under observation.  The patient has already been seen by Dr. Radford who recommends no EEG and recommends Ambien and Psychiatry to help the patient with her anxiety.  Of note, the patient has not lost consciousness.  She does not have any bowel or bladder incontinence.  She is accompanied by her .      PAST MEDICAL HISTORY:   1.  History of serotonin syndrome.  Typically this has been medication related involved seizure activity and involuntary muscle jerking.  First happened when she was 21.  She was recently admitted in 05/2015 with myoclonic jerking with a negative neurological workup.   2.  History of mild depression.   3.  History of myalgia and myositis.   4.  History of deviated septum.   5.  History of female infertility.      PAST SURGICAL HISTORY:   1.  History of repair of a nasal septum.   2.  History of aspiration curettage.   3.  History of laparoscopic drainage of intra-abdominal hematoma, status post ruptured right ovarian cyst.      FAMILY HISTORY:  Positive for depression, hypertension, coronary artery disease in her father.  He had a MI at age 64.  Mother has hyperlipidemia, hypothyroidism, breast cancer.      SOCIAL HISTORY:  The patient is .  She and her  have 2 children, age 7 and 12.  She does not smoke.  She drinks alcohol very seldomly.  She has not used illicit drugs or street drugs.  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      PRIOR TO ADMISSION MEDICATIONS:  Wellbutrin 75 mg p.o. daily, Ibuprofen 400-800 mg daily p.r.n., Zanaflex 2 mg b.i.d. for muscle spasms.  The patient also takes melatonin occasionally for sleep and did take on Saturday and Sunday night a Benadryl and melatonin due to her sinus congestion.      REVIEW OF SYSTEMS:  Complete review of systems is negative other than listed in History of Present Illness.        PHYSICAL EXAMINATION:   GENERAL:  Anxious  appearing female who appears pathetic appearing.   VITAL SIGNS:  Her blood pressure was 95/65, pulse of 105, respiratory rate 16, temperature of 98.2.   HEENT:  Pupils are equal.  Extraocular movements are intact.  Pharynx without erythema.   NECK:  Supple.   CHEST:  Clear to auscultation.   CARDIOVASCULAR:  Regular rate and rhythm, normal S1, S2.   ABDOMEN:  Positive bowel sounds, soft and nontender.   EXTREMITIES:  No edema.   NEUROLOGIC:  The patient does have occasional shaking of her lower extremities bilaterally.  She does not lose consciousness with this.  These occur and they wax and wane during the time that she was interviewed.      LABORATORY DATA:  Sodium 139, potassium 3.4, chloride 106, bicarbonate 16, BUN 15, creatinine 0.84.  Her LFTs are within normal limits.  HCG was negative.  Lactic acid 8.9, glucose 87, white count 5.6, hemoglobin 14.8, platelet count 222,000.  EKG shows sinus tachycardia.      ASSESSMENT AND PLAN:  Dae Castañeda is a 41-year-old female with a long history of myoclonic jerks.  She had been admitted in 05/2015 with very similar symptoms, although at that time it was precipitated by exercise.  She does not exercised like this anymore, although [place] having symptoms.  When she tries to work out at the gym these exacerbate her symptoms.  The patient also has a history of serotonin syndrome.  This first occurred when she was 21 years old.  The patient is now admitted with myoclonic jerks.  Initially was going to have Neurology see the patient and have an EEG done and also have Urology advise on the MRI, however, Neurology has already seen the patient and recommends no EEG and have Psychiatry see the patient and consider some Ativan.   1.  Myoclonic jerks.  The patient will be admitted on observation status, but to the neuro floor.  Will have neuro checks.  Given Dr. Radford's consult we will have psych see the patient  and will also use some oral Ativan.  Would like to hydrate the patient given her elevated lactic acid and repeat this in the morning.   2.  Lactic acidosis which is probably secondary to her myoclonic jerks.  I expect that as these improve the patient's lactic acid will decrease.  This is probably due to muscle.  We will write for this to be repeated in the morning.     3.  Deep venous thrombosis prophylaxis.  The patient will be on SCDs.      CODE STATUS:  Full code.      DISPOSITION:  The patient will be admitted to neuro floor but under observation status.         WENDIE GREY MD             D: 2017 15:38   T: 2017 16:29   MT:       Name:     GARDENIA WISEMAN   MRN:      [id number]        Account:      [id number]   :      1975           Admitted:     [id number]      Document: [id number]       cc: Pepe Mustafa MD

## 2017-01-23 NOTE — IP AVS SNAPSHOT
[facility]    [address]    [place] [address]    Phone:  [phone]                                       After Visit Summary   1/23/2017    Dea Castañeda    MRN: [id number]           After Visit Summary Signature Page     I have received my discharge instructions, and my questions have been answered. I have discussed any challenges I see with this plan with the nurse or doctor.    ..........................................................................................................................................  Patient/Patient Representative Signature      ..........................................................................................................................................  Patient Representative Print Name and Relationship to Patient    ..................................................               ................................................  Date                                            Time    ..........................................................................................................................................  Reviewed by Signature/Title    ...................................................              ..............................................  Date                                                            Time

## 2017-01-24 VITALS
RESPIRATION RATE: 16 BRPM | SYSTOLIC BLOOD PRESSURE: 90 MMHG | OXYGEN SATURATION: 97 % | HEART RATE: 83 BPM | TEMPERATURE: 98 F | HEIGHT: 64 IN | DIASTOLIC BLOOD PRESSURE: 57 MMHG

## 2017-01-24 LAB
ALDOLASE SERPL-CCNC: 2.3
ANION GAP SERPL CALCULATED.3IONS-SCNC: 10 MMOL/L (ref 3–14)
BUN SERPL-MCNC: 8 MG/DL (ref 7–30)
CALCIUM SERPL-MCNC: 8 MG/DL (ref 8.5–10.1)
CHLORIDE SERPL-SCNC: 110 MMOL/L (ref 94–109)
CK SERPL-CCNC: 78 U/L (ref 30–225)
CO2 SERPL-SCNC: 21 MMOL/L (ref 20–32)
CREAT SERPL-MCNC: 0.71 MG/DL (ref 0.52–1.04)
GFR SERPL CREATININE-BSD FRML MDRD: ABNORMAL ML/MIN/1.7M2
GLUCOSE SERPL-MCNC: 79 MG/DL (ref 70–99)
LACTATE BLD-SCNC: 0.5 MMOL/L (ref 0.7–2.1)
POTASSIUM SERPL-SCNC: 3.7 MMOL/L (ref 3.4–5.3)
SODIUM SERPL-SCNC: 141 MMOL/L (ref 133–144)

## 2017-01-24 PROCEDURE — G0378 HOSPITAL OBSERVATION PER HR: HCPCS

## 2017-01-24 PROCEDURE — 82085 ASSAY OF ALDOLASE: CPT | Performed by: INTERNAL MEDICINE

## 2017-01-24 PROCEDURE — 83605 ASSAY OF LACTIC ACID: CPT | Performed by: INTERNAL MEDICINE

## 2017-01-24 PROCEDURE — 36415 COLL VENOUS BLD VENIPUNCTURE: CPT | Performed by: INTERNAL MEDICINE

## 2017-01-24 PROCEDURE — 99217 ZZC OBSERVATION CARE DISCHARGE: CPT | Performed by: INTERNAL MEDICINE

## 2017-01-24 PROCEDURE — 25000132 ZZH RX MED GY IP 250 OP 250 PS 637: Performed by: INTERNAL MEDICINE

## 2017-01-24 PROCEDURE — 99204 OFFICE O/P NEW MOD 45 MIN: CPT | Performed by: PSYCHIATRY & NEUROLOGY

## 2017-01-24 PROCEDURE — 25000128 H RX IP 250 OP 636: Performed by: INTERNAL MEDICINE

## 2017-01-24 PROCEDURE — 82550 ASSAY OF CK (CPK): CPT | Performed by: INTERNAL MEDICINE

## 2017-01-24 PROCEDURE — 80048 BASIC METABOLIC PNL TOTAL CA: CPT | Performed by: INTERNAL MEDICINE

## 2017-01-24 RX ADMIN — SODIUM CHLORIDE: 9 INJECTION, SOLUTION INTRAVENOUS at 00:20

## 2017-01-24 RX ADMIN — BUPROPION HYDROCHLORIDE 75 MG: 75 TABLET, FILM COATED ORAL at 09:27

## 2017-01-24 ASSESSMENT — VISUAL ACUITY
OU: DOUBLE VISION/DIPLOPIA
OU: NORMAL ACUITY

## 2017-01-24 NOTE — PLAN OF CARE
Problem: Goal Outcome Summary  Goal: Goal Outcome Summary  Outcome: No Change  Pt a/ox4 with VSS, runs tachy at times and CMS intact. Neuros with 2/5 strength on exam but RIOS when up in room walking.eating etc... C/o intermittent blurry and double vision. 1x episode of jerking/kicking in BLE, pt awake and talking during whole event, tachycardic during even with sats in 100% maintained during entire time, requested ativan and tylenol afterwards. Up in room with SBA. Regular diet with moderate PO. Voiding in BR up with SBA and GB. Plan for psych consult in am and d/c plan to follow.

## 2017-01-24 NOTE — PROGRESS NOTES
Met with pt to discuss discharge planning. Pt wants to make her own appt with her primary. Advised pt to make an appt within 7 days for hospital follow up.

## 2017-01-24 NOTE — CONSULTS
"PSYCHIATRIC CONSULTATION       DATE OF ADMISSION:  01/23/2017.      DATE OF SERVICE:  01/24/2017.        REASON FOR CONSULTATION:  Anxiety, presents with myoclonic jerks.      REQUESTING PHYSICIAN:  Nataliya Cooper MD      IDENTIFYING DATA:  Dae Castañeda is a 41-year-old woman who reports she has been  to her  for the last 17 years.  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  She reports some college education.  She states she does not have ongoing contact with psychiatry or psychotherapist.  She presented to the ED here at [facility] via EMS on account of seizure-like activity.  Psychiatry was consulted to render an opinion on her myoclonic spasms which are thought to be anxiety related.  Information was gathered through direct patient contact as well as collateral information provided by her  who was present throughout this interview and chart review.      CHIEF COMPLAINT:  \"My neurologist thinks I may have serotonin syndrome.\"      HISTORY OF PRESENT ILLNESS:  Dae Castañeda reports an established history of fibromyalgia.  She reports she was previously treated for major depressive disorder in her late teens and early 20s.  She states that she was on Paxil about the age of 21 and it was at that time that she experienced her first neurological presentation.  She reports she experienced hallucinations and bizarre delusions where she thought her family members were aliens.  She reports that she was also having muscular spasms at the time and this landed her in the ICU for about 6 days.  She states that since then, she has had multiple episodes of this unusual phenomenon where she states she experiences spasm of her muscles and finds herself having intense seizure-like activity, but she does not lose consciousness.  She states \"it is very " "difficult to watch for bystanders \"as I flop around a lot.\"  During this episode, however, the patient reports she had been dealing with some type of upper respiratory tract infection over the weekend and had used Benadryl to facilitate sleep on 2 consecutive nights.  She states she was ultimately able to return to work yesterday and while there, she felt lightheaded and was leaning on things to support herself.  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  She stated that on the way to the nurse's office, she was helped by a couple of people, but she realized that she was about to faint, so she essentially lowered herself to the floor.  She states the school nurse came to see her and it was at that point that she realized that she was going to have one of her episodes.  She claims her legs started to contract at which point she claims she advised the school nurse of her history and advised her that she was not a seizure patient but that she was going to be flopping around and having seizure-like activity in the next few minutes.  She claims she advised the nurse to call 911 and protect herself from injury.  She claims she subsequently developed severe spasms in her legs and whole body convulsions in addition to hyperventilation.  While en route to the Emergency Department, the patient received Versed.  She states she sees Dr. Donny Mustafa at [facility] in [place] and he had advised that anytime she has any of these episodes that they should record it and have CK's and aldolase collected.      With respect to ongoing psychosocial stressors, the patient denies any active problems or stressors.  She does not endorse neurovegetative symptoms of depression.  She denies experiencing symptoms of tom or psychosis.  She does not endorse financial or marital stress.  She denies any work related issues.  She reports that she has noticed that she " developed similar symptoms doing vigorous physical exercise lower or extreme muscle strain.  She states she takes Wellbutrin for her fibromyalgia.  She denies use of alcohol or illicit chemicals.  Urine toxicology screen on admission was positive for benzodiazepines.  Lactic acid was low at 0.5 with CK normal at 78.  A BMP was within normal limits with mildly elevated chloride of 110 and calcium of 8.0.      PAST PSYCHIATRIC HISTORY:  As described in the history of present illness.      CHEMICAL USE HISTORY:  None reported.      PAST MEDICAL HISTORY:   1.  Fibromyalgia.   2.  History of infertility.   3.  History of deviated septum.   4.  History of serotonin syndrome.      PAST SURGICAL HISTORY:   1.  History of repair of nasal septum.   2.  History of aspiration curettage.   3.  History of laparoscopic drainage of intra-abdominal hematoma, status post ruptured right ovarian cyst.      MEDICATIONS  PRIOR TO ADMISSION:   1.  Wellbutrin 75 mg p.o. b.i.d.   2.  Zanaflex 2 mg p.o. b.i.d. p.r.n. muscle spasms.   3.  Ibuprofen 400-800 mg daily p.r.n. moderate pain.      ALLERGIES:  Codeine, Paxil, Effexor.      FAMILY PSYCHIATRIC HISTORY:  Depression in patient's father.      SOCIAL HISTORY:  Patient reports she has 2 siblings.  She reports she grew up in [place].  She denies any history of abuse.  She reports graduating high school and completing some college.  She is  and has 2 children, ages 7 and 12.  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  She denies legal issues or criminal history.      REVIEW OF SYSTEMS:  I refer you to the 10-point review of systems completed by Nataliya Cooper MD on 01/23/2017, which remains unchanged.      VITAL SIGNS:  Blood pressure 90/57, pulse 83, respirations 16, temperature 98.        MENTAL STATUS EXAMINATION:  This is a bespectacled, middle-aged woman who appears younger than her stated age of 41.  She is seen at her bedside where she is sitting up  "in bed.  Her  is present during this interview.  She makes good eye contact and she speaks clearly and coherently.  Her mood is described as \"good\" and her affect is full range.  Her thought process is logical, relevant and goal directed.  There is no evidence of psychosis.  She denies the presence of auditory or visual hallucinations.  She denies suicidal or homicidal ideations.  She is future oriented.  She is alert and oriented to time, place and person and her recall recent and remote events is adequate.  Her gait and station are not assessed as she is bedbound.  Muscle strength is adequate.  Her fund of knowledge is adequate.  Her language is appropriate.  She displays normal associations.  Her recall of recent and remote events is adequate.  Her attention and concentration are good.  Her impulse control is fair.  She displays adequate insight and judgment.  Risk assessment at this time is considered low.      DIAGNOSTIC IMPRESSION:  Jyoti Castañeda is a 41-year-old woman with prior history of depression who presented to the hospital on account of myoclonic jerks with no associated loss of consciousness who is being evaluated to rule out underlying anxiety or depression.  The patient denies neurovegetative symptoms of depression or symptoms suggestive of panic attacks, obsessions, compulsions or posttraumatic stress disorder.    DIAGNOSES:  1.  Rule out Functional Neurological Symptom Disorder (Converiosn disorder).   2.  Myoclonic jerks.   3.  History of fibromyalgia.      RECOMMENDATIONS:   1.  Medical management as you are.   2.  It is very unclear if the patient's symptoms are psychological in nature as her  has witnessed multiple episodes of these, and she is currently under the care of a neurologist.  It was explained to her that it is unlikely that Wellbutrin would cause her to experience the symptoms she presented with as Wellbutrin works more through dopamine than serotonin.  The " patient did not feel that her medications were responsible but could not explain her symptoms.  It is therefore suggested that she follow up with her outpatient neurologist as scheduled, as she currently does not present with any acute psychiatric symptoms that would warrant attention or medication management.  No further recommendations are given from a psychiatric perspective.      Thanks for the consult.         HELEN MCMILLAN MD             D: 2017 16:09   T: 2017 17:02   MT: EVER      Name:     GARDENIA WISEMAN   MRN:      [id number]        Account:       [id number]   :      1975           Consult Date:  2017      Document: [id number]       cc: Nataliya Cooper MD

## 2017-01-24 NOTE — CONSULTS
Pt seen for initial psychiatric evaluation, please see my dictation for details and recommendations. No additional recommendations from psych perspective.

## 2017-01-24 NOTE — DISCHARGE SUMMARY
[facility]    Discharge Summary  Hospitalist    Date of Admission:  1/23/2017  Date of Discharge:  1/24/2017  Discharging Provider: Radha Cotter MD  Date of Service (when I saw the patient): 01/24/2017    Discharge Diagnoses  Tremors   Lactic acidosis   H/O depression   H/O serotonin syndrome     History of Present Illness  Dae Castañeda is an 41 year old female who presented with myoclonic jerks . Please see admission H and P for details     Hospital Course  Dae Castañeda was admitted on 1/23/2017.  The following problems were addressed during her hospitalization:    Tremors :  Neurology saw the patient and determined that her clinical presentation is not due to neurological condition as she has had complete neurological evaluation before and her symptoms were not consistent with seizures . CK was normal , aldolase was pending at the time of discharge . Neurology recommended psych evaluatation . PAtient will be discharged once psychiatric consultation note is available to determine any further psych f/u and changes in medication recommendation .   Her lactic acid improved with hydration     Active Problems:    Myoclonic jerking      Radha Cotter MD    Significant Results and Procedures  None     Pending Results  These results will be followed up by neurology/PCP   Unresulted Labs Ordered in the Past 30 Days of this Admission     Date and Time Order Name Status Description    1/24/2017 0000 Aldolase In process     1/23/2017 1048 Aldolase In process           Code Status  Full Code       Primary Care Physician  Pepe Issa    Physical Exam  Temp: 97.4  F (36.3  C) Temp src: Oral BP: 96/55 mmHg Pulse: 86   Resp: 16 SpO2: 97 % O2 Device: None (Room air)    There were no vitals filed for this visit.  Vital Signs with Ranges  Temp:  [97.4  F (36.3  C)-98.4  F (36.9  C)] 97.4  F (36.3  C)  Pulse:  [] 86  Resp:  [16-18] 16  BP: ()/(52-87) 96/55 mmHg  SpO2:  [96 %-99 %]  97 %  I/O last 3 completed shifts:  In: 1866 [P.O.:380; I.V.:1486]  Out: 601 [Urine:600; Emesis/NG output:1]    Exam:  Constitutional: Awake, alert and no distress. Appears comfortable  Head: Normocephalic. No masses, lesions, tenderness or abnormalities  ENT: ENT exam normal, no neck nodes or sinus tenderness  Cardiovascular: RRR. No murmurs, no JVD  Respiratory: Lungs clear to auscultation B/L, normal WOB, no wheezes or crackles   Gastrointestinal: Abdomen soft, non-tender. BS normal. No masses, organomegaly  : Deferred  Musculoskeletal: extremities normal- no gross deformities noted  Skin: Warm and dry, no rash  Neuro: Power 5/5, sensation intact , no gross cranial nerve deficit      Discharge Disposition  Discharged to home  Condition at discharge: Stable    Consultations This Hospital Stay  NEUROLOGY IP CONSULT  NEPHROLOGY IP CONSULT  PSYCHIATRY IP CONSULT    Time Spent on This Encounter  IRadha, personally saw the patient today and spent greater than 30 minutes discharging this patient.    Discharge Orders    Reason for your hospital stay   Involuntary movement of extremities     Follow-up and recommended labs and tests    Follow up with primary care provider, Pepe Issa, within 7 days for hospital follow- up.  No follow up labs or test are needed.  F/U with your regular neurologist and psychaitrist next available     Activity   Your activity upon discharge: activity as tolerated     Full Code     Diet   Follow this diet upon discharge: Orders Placed This Encounter  Regular Diet Adult       Discharge Medications  Current Discharge Medication List      CONTINUE these medications which have NOT CHANGED    Details   IBUPROFEN PO Take 400-800 mg by mouth daily as needed for moderate pain or fever       tiZANidine (ZANAFLEX) 2 MG tablet Take 1 tablet (2 mg) by mouth 2 times daily as needed for muscle spasms  Qty: 30 tablet, Refills: 0    Associated Diagnoses: Acute midline thoracic back pain       buPROPion (WELLBUTRIN) 75 MG tablet TAKE 1 TABLET (75 MG) BY MOUTH 2 TIMES DAILY  Qty: 180 tablet, Refills: 1    Associated Diagnoses: Major depressive disorder, single episode, mild (H)           Allergies  Allergies   Allergen Reactions     Codeine GI Disturbance     Paroxetine      serotonin syndrome, all seratonin drugs     Venlafaxine      serotonin syndrome     Data  Most Recent 3 CBC's:  Recent Labs   Lab Test  01/23/17   1048  05/12/15   1220  12/30/14   1006   WBC  5.6  8.4  7.0   HGB  14.8  14.0  14.2   MCV  90  89  93   PLT  222  254  243      Most Recent 3 BMP's:  Recent Labs   Lab Test  01/24/17   0740  01/23/17   1048  05/12/15   1220   NA  141  139  137   POTASSIUM  3.7  3.4  3.8   CHLORIDE  110*  106  106   CO2  21  16*  22   BUN  8  15  16   CR  0.71  0.84  0.81   ANIONGAP  10  17*  9   CHIDI  8.0*  8.8  8.7   GLC  79  87  88     Most Recent 2 LFT's:  Recent Labs   Lab Test  01/23/17   1048  05/12/15   1220   AST  18  16   ALT  19  20   ALKPHOS  63  67   BILITOTAL  0.3  0.4     Most Recent INR's and Anticoagulation Dosing History:  Anticoagulation Dose History     Recent Dosing and Labs Latest Ref Rng 8/22/2008    INR 0.86 - 1.14 1.04        Most Recent 3 Troponin's:No lab results found.  Most Recent Cholesterol Panel:No lab results found.  Most Recent 6 Bacteria Isolates From Any Culture (See EPIC Reports for Culture Details):  Recent Labs   Lab Test  08/02/12   1523  09/13/11   1354  03/17/10   1647   CULT  No Beta Streptococcus isolated  <10,000 colonies/mL Beta hemolytic Streptococcus group B Group B Streptococci are susceptible to ampicillin, penicillin, and cefazolin,  but may be erythromycin and/or clindamycin resistant. Contact Microbiology if  your patient is allergic to penicillin and you need erythromycin and/or  clindamycin testing to be performed. Clindamycin and Erythromycin are not  routinely prescribed for isolates from the urinary tract. Susceptibility testing  must be requested  within 5 days.  No Beta Streptococcus isolated     Most Recent TSH, T4 and A1c Labs:  Recent Labs   Lab Test  07/02/15   1122   TSH  0.88   T4  0.82     Results for orders placed or performed in visit on 03/09/16   XR Chest 2 Views     Value    Radiologist flags See report. (Urgent)    Narrative    CHEST TWO VIEWS 3/9/2016 9:37 AM     HISTORY: Cough    COMPARISON: 10/13/2006    FINDINGS: Right lower lobe infiltrate. Remaining lungs clear. The  cardiac silhouette is not enlarged. Pulmonary vasculature is  unremarkable.          Impression    IMPRESSION: Right lower lobe infiltrate.    [Urgent result: See report].    JENNIFER SOSA MD

## 2017-01-24 NOTE — PLAN OF CARE
Problem: Goal Outcome Summary  Goal: Goal Outcome Summary  Outcome: Improving  AxOx4. nueros intact except genderalized weakness with slowed paced speech. VSS. Regular diet. Up with one assist and gait belt. Denies pain. seizure precautions, no seizure activity this shift. Discharge plan pending at this time.

## 2017-01-24 NOTE — PLAN OF CARE
Problem: Goal Outcome Summary  Goal: Goal Outcome Summary  Outcome: Improving  A&Ox4. Neuros: double vision, blurred vision, and generalized weakness. VSS, RA. Regular diet. Up with SBA. Denies pain. Seizure precaution maintained. No seizures witness or reported this shift. Plan for psych consult today, will continue to monitor.

## 2017-01-24 NOTE — PROGRESS NOTES
Hospitalist called back and stated that she will place everything needed for discharge except the actual discharge order until the pysch dictation note is in.

## 2017-01-25 ENCOUNTER — TELEPHONE (OUTPATIENT)
Dept: FAMILY MEDICINE | Facility: CLINIC | Age: 42
End: 2017-01-25

## 2017-01-25 LAB — ALDOLASE SERPL-CCNC: 2

## 2017-01-25 NOTE — PLAN OF CARE
Problem: Goal Outcome Summary  Goal: Goal Outcome Summary  A&Ox4. Neuros intact. Up with SBA.  Patient d/c home via transport from . Discharge instructions reviewed with patient, questions answered at this time. All belongings sent with patient at time of discharge.

## 2017-01-25 NOTE — TELEPHONE ENCOUNTER
This patient was seen at  [facility] on Tue 1/24/2017.     Diagnosis: Myoclonic Jerking    Follow-up instructions:     Follow-up and recommended labs and tests     Follow up with primary care provider, Pepe Issa, within 7 days for hospital follow- up.  No follow up labs or test are needed.  F/U with your regular neurologist and psychaitrist next available                A follow-up visit has not been scheduled.      Michelle PERES    Please follow-up with patient.

## 2017-01-25 NOTE — TELEPHONE ENCOUNTER
ED / Discharge Outreach Protocol    Patient Contact    Attempt # 1    Was call answered?  No.  Left message on voicemail with information to call me back.    Yusra Del Rosario RN   [facility] - Triage

## 2017-01-26 ENCOUNTER — OFFICE VISIT (OUTPATIENT)
Dept: FAMILY MEDICINE | Facility: CLINIC | Age: 42
End: 2017-01-26
Payer: COMMERCIAL

## 2017-01-26 VITALS
WEIGHT: 113 LBS | SYSTOLIC BLOOD PRESSURE: 100 MMHG | HEIGHT: 64 IN | OXYGEN SATURATION: 99 % | DIASTOLIC BLOOD PRESSURE: 70 MMHG | BODY MASS INDEX: 19.29 KG/M2 | TEMPERATURE: 98.8 F | HEART RATE: 90 BPM

## 2017-01-26 DIAGNOSIS — Z09 HOSPITAL DISCHARGE FOLLOW-UP: Primary | ICD-10-CM

## 2017-01-26 DIAGNOSIS — G25.3 MYOCLONIC JERKING: ICD-10-CM

## 2017-01-26 PROCEDURE — 99214 OFFICE O/P EST MOD 30 MIN: CPT | Performed by: FAMILY MEDICINE

## 2017-01-26 ASSESSMENT — PATIENT HEALTH QUESTIONNAIRE - PHQ9: 5. POOR APPETITE OR OVEREATING: NOT AT ALL

## 2017-01-26 ASSESSMENT — ANXIETY QUESTIONNAIRES
6. BECOMING EASILY ANNOYED OR IRRITABLE: NOT AT ALL
GAD7 TOTAL SCORE: 0
7. FEELING AFRAID AS IF SOMETHING AWFUL MIGHT HAPPEN: NOT AT ALL
2. NOT BEING ABLE TO STOP OR CONTROL WORRYING: NOT AT ALL
IF YOU CHECKED OFF ANY PROBLEMS ON THIS QUESTIONNAIRE, HOW DIFFICULT HAVE THESE PROBLEMS MADE IT FOR YOU TO DO YOUR WORK, TAKE CARE OF THINGS AT HOME, OR GET ALONG WITH OTHER PEOPLE: NOT DIFFICULT AT ALL
5. BEING SO RESTLESS THAT IT IS HARD TO SIT STILL: NOT AT ALL
1. FEELING NERVOUS, ANXIOUS, OR ON EDGE: NOT AT ALL
3. WORRYING TOO MUCH ABOUT DIFFERENT THINGS: NOT AT ALL

## 2017-01-26 NOTE — TELEPHONE ENCOUNTER
"ED/Discharge Protocol    \"Hi, my name is Denisa Atkinson, a registered nurse, and I am calling on behalf of Dr. Issa's office at [facility].  I am calling to follow up and see how things are going for you after your recent visit.\"    \"I see that you were in the (ER/UC/IP) on 1/24/17.    How are you doing now that you are home?\" I'm feeling lightheaded.      Is patient experiencing symptoms that may require a hospital visit?  None report     Discharge Instructions    \"Let's review your discharge instructions.  What is/are the follow-up recommendations?  Pt. Response: to f/u with insurance     \"Were you instructed to make a follow-up appointment?\"  Pt. Response: Yes.  Has appointment been made?   No.  \"Can I help you schedule that appointment?\" I'll call you back to schedule need to check with my  schedule       \"When you see the provider, I would recommend that you bring your discharge instructions with you.    Medications    \"How many new medications are you on since your hospitalization/ED visit?\"    0-1  \"How many of your current medicines changed (dose, timing, name, etc.) while you were in the hospital/ED visit?\"   0-1  \"Do you have questions about your medications?\"   No  \"Were you newly diagnosed with heart failure, COPD, diabetes or did you have a heart attack?\"   No  For patients on insulin: \"Did you start on insulin in the hospital or did you have your insulin dose changed?\"   No    Medication reconciliation completed? No,     Was MTM referral placed (*Make sure to put transitions as reason for referral)?   No    Call Summary    \"Do you have any questions or concerns about your condition or care plan at the moment?\"    Yes light headedness  Triage nurse advice given: advise to move slow, adequate fluid intake.  Have  drive to clinic for post hospital f/u.  Agrees with plan.     Patient was in ER 1 in the past year (assess appropriateness of ER visits.)      \"If you have questions or things don't " "continue to improve, we encourage you contact us through the main clinic number,  [phone].  Even if the clinic is not open, triage nurses are available 24/7 to help you.     We would like you to know that our clinic has extended hours (provide information).  We also have urgent care (provide details on closest location and hours/contact info)\"      \"Thank you for your time and take care!\"    Denisa Atkinson RN          "

## 2017-01-26 NOTE — Clinical Note
[facility]          [address] [place] [address]                            [phone]  Fax: [phone]    Dae Castañeda  [address] [place] [address]    [phone]    January 26, 2017      To whom it may concern    Please excuse Dae MCCLENDON Jessivanessa Castañeda from work on 1/23/17 through 1/27/17 due to illness.  May be able to resume work 1/30/17, unless she is not feeling well by then.    If you have any other questions or concerns please feel free to contact me at anytime.        Sincerely,      Luis Manuel Cantu M.D.

## 2017-01-26 NOTE — PROGRESS NOTES
SUBJECTIVE:                                                    Dae Castañeda is a 41 year old female who presents to clinic today for the following health issues:          Hospital Follow-up Visit:    Hospital/Nursing Home/IP Rehab Facility: [facility]  Date of Admission: 1/23/17  Date of Discharge: 1/24/17  Reason(s) for Admission: abnormal muscle jerking.             Problems taking medications regularly:  None       Medication changes since discharge: None       Problems adhering to non-medication therapy:  None    Summary of hospitalization:  [facility] discharge summary reviewed  Patient was seen by neurology and Psychiatry . No etiology of her symptoms was found. She was suggested to see neurology as an outpatient.   Diagnostic Tests/Treatments reviewed.  Follow up needed: needs to see neurology  Other Healthcare Providers Involved in Patient s Care:       as above  Update since discharge: stable. No reoccurrence of the twitching. She feels very lightheaded and tired. Not able to function well. 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 Works at a school.      wonders about taking her to [facility].   She reports that she has had about 7-8 such episodes in the last 20 years.   She tells that she has had serotonin syndrome in the past. Currently on Wellbutrin.     Post Discharge Medication Reconciliation: discharge medications reconciled, continue medications without change.  Plan of care communicated with patient and her .   Work excuse note given.      Coding guidelines for this visit:  Type of Medical   Decision Making Face-to-Face Visit       within 7 Days of discharge Face-to-Face Visit        within 14 days of discharge   Moderate Complexity 12989 [id number]   High Complexity 40643 [id number]              Problem list and histories reviewed & adjusted, as indicated.  Additional history: as documented    Patient Active Problem List   Diagnosis     Chromosome abnormality      "Myalgia and myositis     Female infertility associated with anovulation     CARDIOVASCULAR SCREENING; LDL GOAL LESS THAN 160     Mild Depression  [296.21]       Serotonin syndrome     Myoclonic jerking     Fibromyalgia     Past Surgical History   Procedure Laterality Date     Hc repair of nasal septum  2000     Aspiration and curettage  3/2008     Missed AB following in vitro fertilzation/implantation     Laparoscopy  2011     for drainage intra-abdominal hematoma s/p ruptured right ovarian cyst       Social History   Substance Use Topics     Smoking status: Never Smoker      Smokeless tobacco: Never Used     Alcohol Use: Yes      Comment: 1 per month     Family History   Problem Relation Age of Onset     Depression Father      Lipids Mother      Hypertension Father      DIABETES Maternal Grandmother      type 2     DIABETES Maternal Grandfather      type 2     C.A.D. Maternal Grandfather      multiple MIs, onset age 50s     Prostate Cancer Paternal Grandfather       age 70s     Thyroid Disease Mother      hypothyroidism     Breast Cancer Mother 61     C.A.D. Father       MI age 64         Current Outpatient Prescriptions   Medication Sig Dispense Refill     IBUPROFEN PO Take 400-800 mg by mouth daily as needed for moderate pain or fever        buPROPion (WELLBUTRIN) 75 MG tablet TAKE 1 TABLET (75 MG) BY MOUTH 2 TIMES DAILY 180 tablet 1     Allergies   Allergen Reactions     Codeine GI Disturbance     Paroxetine      serotonin syndrome, all seratonin drugs     Venlafaxine      serotonin syndrome       ROS:  C: NEGATIVE for fever, chills, change in weight  E/M: NEGATIVE for ear, mouth and throat problems  R: NEGATIVE for significant cough or SOB  CV: NEGATIVE for chest pain, palpitations or peripheral edema    OBJECTIVE:                                                    /70 mmHg  Pulse 90  Temp(Src) 98.8  F (37.1  C) (Tympanic)  Ht 5' 4\" (1.626 m)  Wt 113 lb (51.256 kg)  BMI 19.39 kg/m2  SpO2 " 99%  LMP 01/09/2017  Body mass index is 19.39 kg/(m^2).   GENERAL: healthy, alert, well nourished, well hydrated,  HENT: ear canals- normal; TMs- normal; Nose- normal; Mouth- no ulcers, no lesions  NECK: no tenderness, no adenopathy, no asymmetry, no masses, no stiffness; thyroid- normal to palpation  RESP: lungs clear to auscultation - no rales, no rhonchi, no wheezes  CV: regular rates and rhythm, normal S1 S2, no S3 or S4 and no murmur, no click or rub -  ABDOMEN: soft, no tenderness, no  hepatosplenomegaly, no masses, normal bowel sounds  NEURO: strength and tone- normal, sensory exam- grossly normal, mentation- intact, speech- normal, reflexes- symmetric         ASSESSMENT/PLAN:                                                        ICD-10-CM    1. Hospital discharge follow-up Z09    2. Myoclonic jerking G25.3 NEUROLOGY ADULT REFERRAL     Questionable etiology of myoclonic jerks. Records from the hospital including labs and  neurology and psychiatry notes reviewed.  Referring to [facility] neurology for evaluation and management.  Instructed to stay well hydrated. Adequate rest and nutrition discussed.   If any symptomatic worsening noted, instructed to notify me back or go to the emergency room.  Follow up with Provider - as needed     Pepe Issa MD  [facility]

## 2017-01-26 NOTE — MR AVS SNAPSHOT
After Visit Summary   1/26/2017    Dae Castañeda    MRN: [id number]           Patient Information     Date Of Birth          1975        Visit Information        Provider Department      1/26/2017 3:00 PM Pepe Issa MD [facility] [place]        Today's Diagnoses     Myoclonic jerking    -  1        Follow-ups after your visit        Additional Services     NEUROLOGY ADULT REFERRAL       Your provider has referred you to: [facility]: Neurology Clinic [facility] [phone]   [email or web address]  Movement Disorder    Reason for Referral: Consult    Please be aware that coverage of these services is subject to the terms and limitations of your health insurance plan.  Call member services at your health plan with any benefit or coverage questions.      Please bring the following with you to your appointment:    (1) Any X-Rays, CTs or MRIs which have been performed.  Contact the facility where they were done to arrange for  prior to your scheduled appointment.    (2) List of current medications  (3) This referral request   (4) Any documents/labs given to you for this referral                  Your next 10 appointments already scheduled     Mar 10, 2017  2:15 PM   (Arrive by 2:00 PM)   New Patient Visit with Cristiano Mckeon MD   [facility] ([facility])    [address] [place] [id number] [phone]              Who to contact     If you have questions or need follow up information about today's clinic visit or your schedule please contact [facility] [place] directly at [phone].  Normal or non-critical lab and imaging results will be communicated to you by MyChart, letter or phone within 4 business days after the clinic has received the results. If you do not hear from us within 7 days, please contact the clinic through MyChart or phone. If you have a  "critical or abnormal lab result, we will notify you by phone as soon as possible.  Submit refill requests through [employer] or call your pharmacy and they will forward the refill request to us. Please allow 3 business days for your refill to be completed.          Additional Information About Your Visit        [employer] Information     [employer] gives you secure access to your electronic health record. If you see a primary care provider, you can also send messages to your care team and make appointments. If you have questions, please call your primary care clinic.  If you do not have a primary care provider, please call [phone] and they will assist you.        Care EveryWhere ID     This is your Care EveryWhere ID. This could be used by other organizations to access your [place] medical records  [id number]        Your Vitals Were     Pulse Temperature Height BMI (Body Mass Index) Pulse Oximetry Last Period    90 98.8  F (37.1  C) (Tympanic) 5' 4\" (1.626 m) 19.39 kg/m2 99% 01/09/2017       Blood Pressure from Last 3 Encounters:   01/26/17 100/70   01/24/17 90/57   11/23/16 99/60    Weight from Last 3 Encounters:   01/26/17 113 lb (51.256 kg)   11/23/16 118 lb (53.524 kg)   06/27/16 117 lb (53.071 kg)              We Performed the Following     NEUROLOGY ADULT REFERRAL          Today's Medication Changes          These changes are accurate as of: 1/26/17  3:32 PM.  If you have any questions, ask your nurse or doctor.               Stop taking these medicines if you haven't already. Please contact your care team if you have questions.     tiZANidine 2 MG tablet   Commonly known as:  ZANAFLEX   Stopped by:  Pepe Issa MD                    Primary Care Provider Office Phone # Fax #    Pepe Issa [phone]       [facility] [place] [address] [place] [address] [address]  [place] [address]        Thank you!     Thank you for choosing [facility] [place]  for your care. Our goal is always " to provide you with excellent care. Hearing back from our patients is one way we can continue to improve our services. Please take a few minutes to complete the written survey that you may receive in the mail after your visit with us. Thank you!             Your Updated Medication List - Protect others around you: Learn how to safely use, store and throw away your medicines at www.disposemymeds.org.          This list is accurate as of: 1/26/17  3:32 PM.  Always use your most recent med list.                   Brand Name Dispense Instructions for use    buPROPion 75 MG tablet    WELLBUTRIN    180 tablet    TAKE 1 TABLET (75 MG) BY MOUTH 2 TIMES DAILY       IBUPROFEN PO      Take 400-800 mg by mouth daily as needed for moderate pain or fever

## 2017-01-26 NOTE — NURSING NOTE
"Chief Complaint   Patient presents with     Hospital F/U       Initial /70 mmHg  Pulse 90  Temp(Src) 98.8  F (37.1  C) (Tympanic)  Ht 5' 4\" (1.626 m)  Wt 113 lb (51.256 kg)  BMI 19.39 kg/m2  SpO2 99%  LMP 01/09/2017 Estimated body mass index is 19.39 kg/(m^2) as calculated from the following:    Height as of this encounter: 5' 4\" (1.626 m).    Weight as of this encounter: 113 lb (51.256 kg)..  BP completed using cuff size: regular Sarah Severson, CMA  "

## 2017-01-27 ENCOUNTER — TELEPHONE (OUTPATIENT)
Dept: FAMILY MEDICINE | Facility: CLINIC | Age: 42
End: 2017-01-27

## 2017-01-27 DIAGNOSIS — G25.3 MYOCLONIC JERKING: Primary | ICD-10-CM

## 2017-01-27 ASSESSMENT — PATIENT HEALTH QUESTIONNAIRE - PHQ9: SUM OF ALL RESPONSES TO PHQ QUESTIONS 1-9: 4

## 2017-01-27 ASSESSMENT — ANXIETY QUESTIONNAIRES: GAD7 TOTAL SCORE: 0

## 2017-01-27 NOTE — TELEPHONE ENCOUNTER
Please fax referral for Neurology Clinic to location and send to patient. Also call patient and help them schedule an appt for as soon as possible at [facility] [place]. Pt currently has appt in March but Dr Issa would like them to be seen sooner.    Thanks -  Sarah Severson, CMA

## 2017-01-27 NOTE — TELEPHONE ENCOUNTER
LARISA called to conference call the patient and the soonest they can get her in a MG with Dr. Franklin Atwood-He specializes in Movement Disorders  Patient is on a wait list for [facility] and the [place] (U avelina HOWE is booking out to 4/14)  Provider can do a Provider to Provider call [phone]; Dr Franklin Atwood is also on EPIC so he could be sent a note as well  Anything outside records from Dr. Mustafa ; patient will have faxed to  MG Dr Atwood [phone]  Michelle PERES

## 2017-01-30 ENCOUNTER — CARE COORDINATION (OUTPATIENT)
Dept: CARE COORDINATION | Facility: CLINIC | Age: 42
End: 2017-01-30

## 2017-01-31 PROBLEM — Z92.89 H/O ELECTROENCEPHALOGRAM: Status: ACTIVE | Noted: 2017-01-31

## 2017-01-31 PROBLEM — R25.9 ABNORMAL INVOLUNTARY MOVEMENT: Status: ACTIVE | Noted: 2017-01-31

## 2017-01-31 NOTE — TELEPHONE ENCOUNTER
I called and spoke to Dr. Atwood. Patient has an appointment in mid March to see him. His clinic is busy and cannot able to accommodate any such requests at this time from what it sounds like.   [facility]  Address: [address] [place] [address]   Phone: [phone]    Referral placed. Please call to see how early they can get the patient in. There also is a [facility] epilepsy clinic in [place] that could be contacted next if needed    She can always wait and see the same provider in March, if she would prefer that.  Notify the patient    Pepe Issa MD  [facility] [place]

## 2017-01-31 NOTE — TELEPHONE ENCOUNTER
Hard to say that these are symptoms of concussion or not. If symptoms continue to get worse, she may go to Er for evaluation. Otherwise I would recommend observation . Stay well hydrated. Use OTC pain medication as needed    Pepe Issa MD  [facility], [place]

## 2017-01-31 NOTE — TELEPHONE ENCOUNTER
"Spoke with patient and informed of below. Patient is wondering is there is a possibility she could have given herself a concussion? States her \"brain hurts\" and feels lightheaded if things are moving around her, or when she is concentrating. States she is sensitive to bright lights. Yesterday was first day back and work and when she got home her head hurt. Denies n/v.     Triage to call with response [phone] okay to leave detailed message.     Yusra Del Rosario RN   [facility] - Triage         "

## 2017-01-31 NOTE — TELEPHONE ENCOUNTER
Spoke with patient and informed of below. Patient verbalized understanding and agrees with plan.   Yusra Del Rosario RN   [facility] - Triage

## 2017-01-31 NOTE — PROGRESS NOTES
Clinic Care Coordination  Care Team    Follow up completed by triage. No Clinic care coordination involvement needed at this time.

## 2017-02-08 ENCOUNTER — OFFICE VISIT (OUTPATIENT)
Dept: FAMILY MEDICINE | Facility: CLINIC | Age: 42
End: 2017-02-08
Payer: COMMERCIAL

## 2017-02-08 VITALS
WEIGHT: 116 LBS | TEMPERATURE: 98.5 F | SYSTOLIC BLOOD PRESSURE: 110 MMHG | OXYGEN SATURATION: 98 % | HEART RATE: 78 BPM | DIASTOLIC BLOOD PRESSURE: 64 MMHG | BODY MASS INDEX: 19.9 KG/M2

## 2017-02-08 DIAGNOSIS — G25.3 MYOCLONIC JERKING: ICD-10-CM

## 2017-02-08 DIAGNOSIS — R25.9 ABNORMAL INVOLUNTARY MOVEMENT: Primary | ICD-10-CM

## 2017-02-08 DIAGNOSIS — R51.9 PRESSURE IN HEAD: ICD-10-CM

## 2017-02-08 PROCEDURE — 99214 OFFICE O/P EST MOD 30 MIN: CPT | Performed by: FAMILY MEDICINE

## 2017-02-08 NOTE — PROGRESS NOTES
"  SUBJECTIVE:                                                    Dae Castañeda is a 41 year old female who presents to clinic today for the following health issues:    Concern - \"pressure in head\"     Onset: noted after hospital stay 1/24/2017    ongoing issues with involuntary shaking, few episodes in the past where she feel pressure in the head and shaking in her head.    She was in the hospital few weeks ago and she was advised her exam is normal, she should follow up with the neurology. She notched since out of hospital she feel sight ore head pressure, and feel whenever she read her head has more pressure an d she cant think and feel some shaking episodes.    Its hard for her to explain what he feel but she feel increase in head pressure, no URI symptoms.    She also feel some balance issues lately.        Problem list and histories reviewed & adjusted, as indicated.  Additional history: as documented    Patient Active Problem List   Diagnosis     Chromosome abnormality     Myalgia and myositis     Female infertility associated with anovulation     CARDIOVASCULAR SCREENING; LDL GOAL LESS THAN 160     Mild Depression  [296.21]       Serotonin syndrome     Myoclonic jerking     Fibromyalgia     H/O electroencephalogram     Abnormal involuntary movement     Past Surgical History   Procedure Laterality Date     Hc repair of nasal septum  4/2000     Aspiration and curettage  3/2008     Missed AB following in vitro fertilzation/implantation     Laparoscopy  1/2011     for drainage intra-abdominal hematoma s/p ruptured right ovarian cyst       Social History   Substance Use Topics     Smoking status: Never Smoker      Smokeless tobacco: Never Used     Alcohol Use: Yes      Comment: 1 per month     Family History   Problem Relation Age of Onset     Depression Father      Lipids Mother      Hypertension Father      DIABETES Maternal Grandmother      type 2     DIABETES Maternal Grandfather      type 2     " C.A.D. Maternal Grandfather      multiple MIs, onset age 50s     Prostate Cancer Paternal Grandfather       age 70s     Thyroid Disease Mother      hypothyroidism     Breast Cancer Mother 61     C.A.D. Father       MI age 64         Current Outpatient Prescriptions   Medication Sig Dispense Refill     IBUPROFEN PO Take 400-800 mg by mouth daily as needed for moderate pain or fever        buPROPion (WELLBUTRIN) 75 MG tablet TAKE 1 TABLET (75 MG) BY MOUTH 2 TIMES DAILY 180 tablet 1     Problem list, Medication list, Allergies, and Medical/Social/Surgical histories reviewed in [facility] and updated as appropriate.    ROS:  C: NEGATIVE for fever, chills, change in weight  E/M: NEGATIVE for ear, mouth and throat problems  R: NEGATIVE for significant cough or SOB  CV: NEGATIVE for chest pain, palpitations or peripheral edema  MUSCULOSKELETAL: NEGATIVE for significant arthralgias or myalgia  NEURO: POSITIVE for dizziness/lightheadedness    OBJECTIVE:                                                    /64 mmHg  Pulse 78  Temp(Src) 98.5  F (36.9  C) (Tympanic)  Wt 116 lb (52.617 kg)  SpO2 98%  LMP 2017  Body mass index is 19.9 kg/(m^2).   GENERAL: healthy, alert, well nourished, well hydrated, no distress  NECK: no tenderness, no adenopathy, no asymmetry, no masses, no stiffness; thyroid- normal to palpation  RESP: lungs clear to auscultation - no rales, no rhonchi, no wheezes  CV: regular rates and rhythm, normal S1 S2, no S3 or S4 and no murmur, no click or rub -  NEURO: strength and tone- normal, sensory exam- grossly normal, mentation- intact, speech- normal, reflexes- symmetric  2 point discrimnation is normal. Slight imbalance with eyes closed.     ASSESSMENT/PLAN:                                                        ICD-10-CM    1. Abnormal involuntary movement R25.9 MR Brain w/o & w Contrast   2. Myoclonic jerking G25.3 MR Brain w/o & w Contrast   3. Pressure in head R51 MR Brain w/o & w  Contrast       Patient has on going issues with involuntary ,movemnts and shaking and since her last episodes, she feel increase pressure in the head. I cant explain her symptoms , it could be underline anxiety issues but cant rule out any neurological symptoms. Suggested to have MRI done and follow up with the neurology.  Warningsigns were dicussed with the patient.    William Chandler MD  [facility] [place]

## 2017-02-08 NOTE — NURSING NOTE
"Chief Complaint   Patient presents with     Hospital F/U       Initial /64 mmHg  Pulse 78  Temp(Src) 98.5  F (36.9  C) (Tympanic)  Wt 116 lb (52.617 kg)  SpO2 98%  LMP 01/30/2017 Estimated body mass index is 19.9 kg/(m^2) as calculated from the following:    Height as of 1/26/17: 5' 4\" (1.626 m).    Weight as of this encounter: 116 lb (52.617 kg).  Medication Reconciliation: complete    Current Outpatient Prescriptions   Medication Sig Dispense Refill     IBUPROFEN PO Take 400-800 mg by mouth daily as needed for moderate pain or fever        buPROPion (WELLBUTRIN) 75 MG tablet TAKE 1 TABLET (75 MG) BY MOUTH 2 TIMES DAILY 180 tablet 1       Kenneth M, [facility]    "

## 2017-02-10 ENCOUNTER — TELEPHONE (OUTPATIENT)
Dept: FAMILY MEDICINE | Facility: CLINIC | Age: 42
End: 2017-02-10

## 2017-02-10 NOTE — TELEPHONE ENCOUNTER
Patient saw Dr. Chandler on 02/8/16 was advised that a clinic would call to schedule a MRI for her, but she has not received a call yet. Please contact patient with clinic number, so she can call to schedule.  Ok to leave detailed message: yes  [phone] (home)     Thank you  Brenda Rogers

## 2017-02-13 ENCOUNTER — TRANSFERRED RECORDS (OUTPATIENT)
Dept: HEALTH INFORMATION MANAGEMENT | Facility: CLINIC | Age: 42
End: 2017-02-13

## 2017-02-13 ENCOUNTER — TELEPHONE (OUTPATIENT)
Dept: FAMILY MEDICINE | Facility: CLINIC | Age: 42
End: 2017-02-13

## 2017-02-13 NOTE — TELEPHONE ENCOUNTER
Results received from [facility] and given to provider to review.  MR Brain without and with Contrast  Michelle PERES

## 2017-02-14 NOTE — TELEPHONE ENCOUNTER
Spoke with patient and informed of below. Patient requests Dr. Issa to review and interpret results please.     Triage to call with response [phone] okay to leave detailed message.     Yusra Del Rosario RN   [facility] - Triage

## 2017-02-14 NOTE — TELEPHONE ENCOUNTER
Left detailed message informing of below. Encouraged patient to call with any questions or concerns.   Yusra Del Rosario RN   [facility] - Triage

## 2017-02-14 NOTE — TELEPHONE ENCOUNTER
Report of MRI of the brain with and without contrast reviewed. Normal appearance of the brain noted. No bleeding, mass, stroke or any abnormal signals noted.  Based on the normal findings, I would suggest following up with neurology for evaluation of symptoms of abnormal body movement/jerking.    Pepe Issa MD  [facility] [place]

## 2017-02-14 NOTE — TELEPHONE ENCOUNTER
Please notify the patient MRI of the brain is completley normal, no abnormality is seen, which is reassuring. Advice to see neurology as previously suggested.

## 2017-03-06 ENCOUNTER — TELEPHONE (OUTPATIENT)
Dept: NEUROLOGY | Facility: CLINIC | Age: 42
End: 2017-03-06

## 2017-03-06 DIAGNOSIS — Q99.9 CHROMOSOME ABNORMALITY: ICD-10-CM

## 2017-03-06 DIAGNOSIS — G25.9 FUNCTIONAL MOVEMENT DISORDER: ICD-10-CM

## 2017-03-06 DIAGNOSIS — G25.3 MYOCLONIC JERKING: Primary | ICD-10-CM

## 2017-03-06 PROBLEM — Z92.89 H/O MAGNETIC RESONANCE IMAGING OF BRAIN AND BRAIN STEM: Status: ACTIVE | Noted: 2017-03-06

## 2017-03-06 NOTE — TELEPHONE ENCOUNTER
Pt called and explained that her records have been reviewed by both and Dr. Mohr and that they are recommending the [facility] clinic for her.  Pt is agreeable with plan but wants to know if she can keep appointment with Dr. Atwood on 3/13/17 to initiate treatment plan. Pt states that she has waited 2 months for this appt and does not think she can wait another month or two. Please advise.    Darla Severin-Brown, LPN

## 2017-03-07 NOTE — TELEPHONE ENCOUNTER
[facility] Call Center    Phone Message    Name of Caller:Dae     Phone Number: Home number on file [phone] (home)    Best time to return call: any    May a detailed message be left on voicemail: yes    Relation to patient: Self    Reason for Call: Patient has a few more questions before the clinic speaks to Dr Atwood    Action Taken: Darla Severin

## 2017-03-07 NOTE — PROGRESS NOTES
Consults  Date of Service: 1/23/2017 2:36 PM  Note Created: 1/23/2017 3:29 PM  Jody Radford MD   Neurology      REQUESTING PHYSICIAN: None stated.       CHIEF COMPLAINT: Evaluate for body tremors, rule out seizures.       HISTORY OF PRESENT ILLNESS: Ms. Dae Castañeda is a 41-year-old right-handed lady who presents for uncontrollable body movements which started to occur today. This is not the first time she has had symptoms like that. Over the last few days she had cold and sore throat and low-grade fever. 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 She now continues to have spells. She is awake and talking to me as she is having intermittent body shivering including more of her legs and occasionally arms and occasionally movements of her head.       She states that she does not lose consciousness and does not become disoriented, does not bite her tongue.       Previously she had similar spells when she was working out. Then she was taking Paxil. She had been admitted to our hospital in 05/2015 and had EEG video monitoring for the same symptoms and did appear to have nonepileptic spells. She had an MRI of the brain in 2015 which, as well, has been normal. She had been seen by neurologist, Dr. Ramsey Hines, who recommended that she would have drawing of her CPK and aldolase if she had spells like that in the future. There was never a diagnosis of seizure disorder. Looking at evaluation in Neurology in our hospital in 05/2015, nonepileptic spells where seen. The patient essentially is having the same symptoms. Today she is slightly hypotensive, afebrile and she does have a slightly elevated lactate.       CURRENT MEDICATIONS: Wellbutrin 75 mg twice a day.       SOCIAL HISTORY: Nondrinker, nonsmoker. She is a school paraprofessional.       FAMILY HISTORY:  Noncontributory to this presentation.       ALLERGIES: Codeine, venlafaxine and Paxil.       REVIEW OF ORGANS AND SYSTEMS: Positive for fibromyalgia and symptoms of depression. She does take bupropion for that.       Review of organs and systems shows for neck pain and migrating body pain related to fibromyalgia.       CURRENT PHYSICAL EXAMINATION:   VITAL SIGNS: Show a blood pressure of 95/65, respiratory rate is 12, heart rate of 105-112.   GENERAL: The patient is in no acute distress, but she does have intermittent body tremors.   NEUROLOGIC: She is awake and alert x3 with clear speech and language function. Mental status is intact. Motor examination shows symmetrical bulk and tone with no evidence of focal weakness. Reflexes are symmetrical. Toes are downgoing. Sensory examination is intact to pinprick and vibration. Coordination is intact to finger-nose-finger.       IMPRESSION: This lady presents with episodes of body tremors which appeared to be intentional. I do not see any evidence of seizures. This is a recurrent problem that had been happening before. The presentation clinically highly suggests anxiety, panic attack or nonepileptic spells. Neurologically, I will do CPK and aldolase which was mentioned by Dr. Ramsey Hines who patient sees in outpatient Neurology. We most likely will see elevation of CPK, as patient having active body movements.       I do recommend that the patient will receive Ativan, per dosing by the Hospitalist. She will have increase in blood pressure with IV hydration, and I do recommend Psychiatry consult.       After review of the chart and clinical history, this is not presentation due to neurological condition, and I would recommend mental health providers to be involved here. I discussed all the above with the patient and her . I will plan to follow on the lab results.           CARINA HILTON MD               D: 01/23/2017 14:36 T: 01/23/2017 15:29 MT: HARDEEP        Name: ROC WISEMANEFRAIN   MRN: [id number] Account: [id number]   : 1975 Consult Date: 2017       Document: [id number]

## 2017-03-07 NOTE — TELEPHONE ENCOUNTER
Patient called. Dr. Atwood has agreed to see this patient.  Pre visit [facility] CLINICAL DOCUMENTATION    Pre-Visit Planning   PREVISIT INFORMATION                                                    Dae Castañeda scheduled for future visit at [facility] specialty clinics.    Patient is scheduled to see Angelic (provider) on 3/13/17 (date)  Reason for visit: MYOTONIC JERKING  Referring provider DANIEL ART [[id number]]  Has patient seen previ [facility]   Medical Records:  recvds along with images    REVIEW                                                      New patient packet mailed to patient: Yes  Medication reconciliation complete: Yes      Current Outpatient Prescriptions   Medication Sig Dispense Refill     IBUPROFEN PO Take 400-800 mg by mouth daily as needed for moderate pain or fever        buPROPion (WELLBUTRIN) 75 MG tablet TAKE 1 TABLET (75 MG) BY MOUTH 2 TIMES DAILY 180 tablet 1       Allergies: Codeine; Paroxetine; and Venlafaxine    (insert provider dot-phrase for provider specific visit requirements)    PLAN/FOLLOW-UP NEEDED                                                      Previsit review complete.  Patient will see provider at future scheduled appointment.     Patient Reminders Given:  Please, make sure you bring an updated list of your medications.   If you are having a procedure, please, present 15 minutes early.  If you need to cancel or reschedule,please call [phone].    Darla Severin-Brown

## 2017-03-07 NOTE — PROGRESS NOTES
"Eddi Ramirez MD   Psychiatry      PSYCHIATRIC CONSULTATION       DATE OF ADMISSION: 01/23/2017.       DATE OF SERVICE: 01/24/2017.       REASON FOR CONSULTATION: Anxiety, presents with myoclonic jerks.       REQUESTING PHYSICIAN: Nataliya Cooper MD       IDENTIFYING DATA: Dae Castañeda is a 41-year-old woman who reports she has been  to her  for the last 17 years. 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 She reports some college education. She states she does not have ongoing contact with psychiatry or psychotherapist. She presented to the ED here at [facility] via EMS on account of seizure-like activity. Psychiatry was consulted to render an opinion on her myoclonic spasms which are thought to be anxiety related. Information was gathered through direct patient contact as well as collateral information provided by her  who was present throughout this interview and chart review.       CHIEF COMPLAINT: \"My neurologist thinks I may have serotonin syndrome.\"       HISTORY OF PRESENT ILLNESS: Dae Castañeda reports an established history of fibromyalgia. She reports she was previously treated for major depressive disorder in her late teens and early 20s. She states that she was on Paxil about the age of 21 and it was at that time that she experienced her first neurological presentation. She reports she experienced hallucinations and bizarre delusions where she thought her family members were aliens. She reports that she was also having muscular spasms at the time and this landed her in the ICU for about 6 days. She states that since then, she has had multiple episodes of this unusual phenomenon where she states she experiences spasm of her muscles and finds herself having intense seizure-like activity, but she does not lose " "consciousness. She states \"it is very difficult to watch for bystanders \"as I flop around a lot.\" During this episode, however, the patient reports she had been dealing with some type of upper respiratory tract infection over the weekend and had used Benadryl to facilitate sleep on 2 consecutive nights. She states she was ultimately able to return to work yesterday and while there, she felt lightheaded and was leaning on things to support herself. 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 She stated that on the way to the nurse's office, she was helped by a couple of people, but she realized that she was about to faint, so she essentially lowered herself to the floor. She states the school nurse came to see her and it was at that point that she realized that she was going to have one of her episodes. She claims her legs started to contract at which point she claims she advised the school nurse of her history and advised her that she was not a seizure patient but that she was going to be flopping around and having seizure-like activity in the next few minutes. She claims she advised the nurse to call 911 and protect herself from injury. She claims she subsequently developed severe spasms in her legs and whole body convulsions in addition to hyperventilation. While en route to the Emergency Department, the patient received Versed. She states she sees Dr. Donny Mustafa at [facility] in [place] and he had advised that anytime she has any of these episodes that they should record it and have CK's and aldolase collected.       With respect to ongoing psychosocial stressors, the patient denies any active problems or stressors. She does not endorse neurovegetative symptoms of depression. She denies experiencing symptoms of tom or psychosis. She does not endorse financial or marital stress. She denies any work related issues. She reports that " she has noticed that she developed similar symptoms doing vigorous physical exercise lower or extreme muscle strain. She states she takes Wellbutrin for her fibromyalgia. She denies use of alcohol or illicit chemicals. Urine toxicology screen on admission was positive for benzodiazepines. Lactic acid was low at 0.5 with CK normal at 78. A BMP was within normal limits with mildly elevated chloride of 110 and calcium of 8.0.       PAST PSYCHIATRIC HISTORY: As described in the history of present illness.       CHEMICAL USE HISTORY: None reported.       PAST MEDICAL HISTORY:   1. Fibromyalgia.   2. History of infertility.   3. History of deviated septum.   4. History of serotonin syndrome.       PAST SURGICAL HISTORY:   1. History of repair of nasal septum.   2. History of aspiration curettage.   3. History of laparoscopic drainage of intra-abdominal hematoma, status post ruptured right ovarian cyst.       MEDICATIONS PRIOR TO ADMISSION:   1. Wellbutrin 75 mg p.o. b.i.d.   2. Zanaflex 2 mg p.o. b.i.d. p.r.n. muscle spasms.   3. Ibuprofen 400-800 mg daily p.r.n. moderate pain.       ALLERGIES: Codeine, Paxil, Effexor.       FAMILY PSYCHIATRIC HISTORY: Depression in patient's father.       SOCIAL HISTORY: Patient reports she has 2 siblings. She reports she grew up in [place]. She denies any history of abuse. She reports graduating high school and completing some college. She is  and has 2 children, ages 7 and 12. 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 She denies legal issues or criminal history.       REVIEW OF SYSTEMS: I refer you to the 10-point review of systems completed by Nataliya Cooper MD on 01/23/2017, which remains unchanged.       VITAL SIGNS: Blood pressure 90/57, pulse 83, respirations 16, temperature 98.       MENTAL STATUS EXAMINATION: This is a bespectacled, middle-aged woman who appears younger than her stated age of 41. She is seen at her bedside where she is sitting  "up in bed. Her  is present during this interview. She makes good eye contact and she speaks clearly and coherently. Her mood is described as \"good\" and her affect is full range. Her thought process is logical, relevant and goal directed. There is no evidence of psychosis. She denies the presence of auditory or visual hallucinations. She denies suicidal or homicidal ideations. She is future oriented. She is alert and oriented to time, place and person and her recall recent and remote events is adequate. Her gait and station are not assessed as she is bedbound. Muscle strength is adequate. Her fund of knowledge is adequate. Her language is appropriate. She displays normal associations. Her recall of recent and remote events is adequate. Her attention and concentration are good. Her impulse control is fair. She displays adequate insight and judgment. Risk assessment at this time is considered low.       DIAGNOSTIC IMPRESSION: Jyoti Castañeda is a 41-year-old woman with prior history of depression who presented to the hospital on account of myoclonic jerks with no associated loss of consciousness who is being evaluated to rule out underlying anxiety or depression. The patient denies neurovegetative symptoms of depression or symptoms suggestive of panic attacks, obsessions, compulsions or posttraumatic stress disorder.     DIAGNOSES:  1. Rule out Functional Neurological Symptom Disorder (Converiosn disorder).   2. Myoclonic jerks.   3. History of fibromyalgia.       RECOMMENDATIONS:   1. Medical management as you are.   2. It is very unclear if the patient's symptoms are psychological in nature as her  has witnessed multiple episodes of these, and she is currently under the care of a neurologist. It was explained to her that it is unlikely that Wellbutrin would cause her to experience the symptoms she presented with as Wellbutrin works more through dopamine than serotonin. The patient did not feel " that her medications were responsible but could not explain her symptoms. It is therefore suggested that she follow up with her outpatient neurologist as scheduled, as she currently does not present with any acute psychiatric symptoms that would warrant attention or medication management. No further recommendations are given from a psychiatric perspective.       Thanks for the consult.           HELEN MCMILLAN MD               D: 2017 16:09 T: 2017 17:02 MT: EVER       Name: GARDENIA WISEMAN   MRN: [id number] Account: [id number]   : 1975 Consult Date: 2017       Document: [id number]       cc: Nataliya Cooper MD

## 2017-03-08 NOTE — TELEPHONE ENCOUNTER
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 It looks like this was identified in a products of conception from a miscarriage several years ago in unbalanced form.

## 2017-03-10 DIAGNOSIS — F32.0 MAJOR DEPRESSIVE DISORDER, SINGLE EPISODE, MILD (H): ICD-10-CM

## 2017-03-10 RX ORDER — BUPROPION HYDROCHLORIDE 75 MG/1
TABLET ORAL
Qty: 180 TABLET | Refills: 0 | OUTPATIENT
Start: 2017-03-10

## 2017-03-10 NOTE — TELEPHONE ENCOUNTER
buPROPion (WELLBUTRIN) 75 MG tablet       Last Written Prescription Date: 11/1/16  Last Fill Quantity: 180; # refills: 1  Last Office Visit with [facility], [facility] or [facility] prescribing provider:  2/8/17        Last PHQ-9 score on record=   PHQ-9 SCORE 1/26/2017   Total Score -   Total Score 4       Lab Results   Component Value Date    AST 18 01/23/2017     Lab Results   Component Value Date    ALT 19 01/23/2017       Michelle Love TC

## 2017-03-10 NOTE — TELEPHONE ENCOUNTER
Duplicate- sent 11/1/16- info sent to pharmacy.  Loren Loera,RN  [facility]  [phone]

## 2017-03-13 ENCOUNTER — OFFICE VISIT (OUTPATIENT)
Dept: NEUROLOGY | Facility: CLINIC | Age: 42
End: 2017-03-13
Payer: COMMERCIAL

## 2017-03-13 VITALS
DIASTOLIC BLOOD PRESSURE: 63 MMHG | WEIGHT: 118.1 LBS | SYSTOLIC BLOOD PRESSURE: 102 MMHG | HEART RATE: 90 BPM | OXYGEN SATURATION: 99 % | HEIGHT: 64 IN | BODY MASS INDEX: 20.16 KG/M2

## 2017-03-13 DIAGNOSIS — R25.9 ABNORMAL INVOLUNTARY MOVEMENT: Primary | ICD-10-CM

## 2017-03-13 LAB
CRP SERPL-MCNC: <2.9 MG/L (ref 0–8)
ERYTHROCYTE [SEDIMENTATION RATE] IN BLOOD BY WESTERGREN METHOD: 7 MM/H (ref 0–20)
LACTATE SERPL-SCNC: 1 MMOL/L (ref 0.4–2)
TSH SERPL DL<=0.05 MIU/L-ACNC: 0.75 MU/L (ref 0.4–4)

## 2017-03-13 PROCEDURE — 86140 C-REACTIVE PROTEIN: CPT | Performed by: PSYCHIATRY & NEUROLOGY

## 2017-03-13 PROCEDURE — 84210 ASSAY OF PYRUVATE: CPT | Mod: 90 | Performed by: PSYCHIATRY & NEUROLOGY

## 2017-03-13 PROCEDURE — 99000 SPECIMEN HANDLING OFFICE-LAB: CPT | Performed by: PSYCHIATRY & NEUROLOGY

## 2017-03-13 PROCEDURE — 83516 IMMUNOASSAY NONANTIBODY: CPT | Mod: 90 | Performed by: PSYCHIATRY & NEUROLOGY

## 2017-03-13 PROCEDURE — 83519 RIA NONANTIBODY: CPT | Mod: 90 | Performed by: PSYCHIATRY & NEUROLOGY

## 2017-03-13 PROCEDURE — 86060 ANTISTREPTOLYSIN O TITER: CPT | Performed by: PSYCHIATRY & NEUROLOGY

## 2017-03-13 PROCEDURE — 86376 MICROSOMAL ANTIBODY EACH: CPT | Performed by: PSYCHIATRY & NEUROLOGY

## 2017-03-13 PROCEDURE — 82390 ASSAY OF CERULOPLASMIN: CPT | Performed by: PSYCHIATRY & NEUROLOGY

## 2017-03-13 PROCEDURE — 85652 RBC SED RATE AUTOMATED: CPT | Performed by: PSYCHIATRY & NEUROLOGY

## 2017-03-13 PROCEDURE — 84443 ASSAY THYROID STIM HORMONE: CPT | Performed by: PSYCHIATRY & NEUROLOGY

## 2017-03-13 PROCEDURE — 83520 IMMUNOASSAY QUANT NOS NONAB: CPT | Mod: 90 | Performed by: PSYCHIATRY & NEUROLOGY

## 2017-03-13 PROCEDURE — 86255 FLUORESCENT ANTIBODY SCREEN: CPT | Mod: 90 | Performed by: PSYCHIATRY & NEUROLOGY

## 2017-03-13 PROCEDURE — 83516 IMMUNOASSAY NONANTIBODY: CPT | Mod: 59 | Performed by: PSYCHIATRY & NEUROLOGY

## 2017-03-13 PROCEDURE — 99202 OFFICE O/P NEW SF 15 MIN: CPT | Performed by: PSYCHIATRY & NEUROLOGY

## 2017-03-13 PROCEDURE — 36415 COLL VENOUS BLD VENIPUNCTURE: CPT | Performed by: PSYCHIATRY & NEUROLOGY

## 2017-03-13 PROCEDURE — 83605 ASSAY OF LACTIC ACID: CPT | Performed by: PSYCHIATRY & NEUROLOGY

## 2017-03-13 PROCEDURE — 86038 ANTINUCLEAR ANTIBODIES: CPT | Performed by: PSYCHIATRY & NEUROLOGY

## 2017-03-13 ASSESSMENT — PAIN SCALES - GENERAL: PAINLEVEL: NO PAIN (0)

## 2017-03-13 NOTE — NURSING NOTE
"Dae Castañeda's goals for this visit include: consult  She requests these members of her care team be copied on today's visit information:     PCP: Pepe Issa    Referring Provider:  Pepe Issa MD  [address] [place] [address]    Chief Complaint   Patient presents with     Consult       Initial /63 (BP Location: Left arm, Cuff Size: Adult Regular)  Pulse 90  Ht 1.626 m (5' 4\")  Wt 53.6 kg (118 lb 1.6 oz)  SpO2 99%  BMI 20.27 kg/m2 Estimated body mass index is 20.27 kg/(m^2) as calculated from the following:    Height as of this encounter: 1.626 m (5' 4\").    Weight as of this encounter: 53.6 kg (118 lb 1.6 oz).  Medication Reconciliation: complete    Do you need any medication refills at today's visit? n  "

## 2017-03-13 NOTE — LETTER
Patient:  Dae Castañeda  :   1975  MRN:     [id number]        Ms.Niomi HAKAN Castañeda  [address] [facility] [place] [address]        2017    Dear Ms.Sohrabian Castañeda,    We are writing to inform you of your test results.    Resulted Orders   Endomysial antibody IgA   Result Value Ref Range    Endomysial IgA Antibody       Negative  Reference range: Negative  (Note)  Negative in normal individuals. May be negative in  dermatitis herpatiformis or celiac disease patients  adhering to a gluten free diet.  Laboratory developed test.    Test Performed by:  [address] [id number]     Tissue transglutaminase clark IgA and IgG   Result Value Ref Range    Tissue Transglutaminase Antibody IgA 1 <7 U/mL      Comment:      Negative   The tTG-IgA assay has limited utility for patients with decreased levels of   IgA. Screening for celiac disease should include IgA testing to rule out   selective IgA deficiency and to guide selection and interpretation of   serological testing. tTG-IgG testing may be positive in celiac disease   patients   with IgA deficiency.      Tissue Transglutaminase Clark IgG <1  Negative   <7 U/mL   Ceruloplasmin   Result Value Ref Range    Ceruloplasmin 25 23 - 53 mg/dL   Thyroid peroxidase antibody   Result Value Ref Range    Thyroid Peroxidase Antibody <10 <35 IU/mL   TSH   Result Value Ref Range    TSH 0.75 0.40 - 4.00 mU/L   Glutamic acid decarboxylase antibody   Result Value Ref Range    Glutamic Acid Decarboxylase Antibody       <5.0  Reference range: 0.0 to 5.0  Unit: IU/mL  (Note)  INTERPRETIVE INFORMATION:  Glutamic Acid Decarboxylase  Antibody  A value greater than 5.0 IU/mL is considered positive for  Glutamic Acid Decarboxylase Antibody.  Performed by [facility],  [address] [place] [id number] [phone]  [email or web address], Abdi Murray MD, Lab. Director     Pyruvic acid   Result  Value Ref Range    Pyruvic 0.053       Comment:      Reference range: 0.030 to 0.107  Unit: mmol/L  (Note)  Performed by [facility],  [address] [place] [place] [place] [address] [phone]  [email or web address], Abdi Murray MD, Lab. Director     Antistreptolysin O   Result Value Ref Range    Antistreptolysin O 30 0 - 120 IU/mL      Comment:      A single ASO analysis may not be meaningful due to the variability of ASO   values   within the normal population.  Both clinical and laboratory findings should   be   considered in reaching a diagnosis.  A single analysis may be less   informative   than a repeat analysis showing a change.     Erythrocyte sedimentation rate auto   Result Value Ref Range    Sed Rate 7 0 - 20 mm/h   CRP inflammation   Result Value Ref Range    CRP Inflammation <2.9 0.0 - 8.0 mg/L   Antinuclear antibody screen by [facility]   Result Value Ref Range    LISA Screen by [facility] 1.4 (H) <1.0      Comment:      Interpretation:  Positive   Lactic acid   Result Value Ref Range    Lactic Acid 1.0 0.4 - 2.0 mmol/L       Your test results fall within the expected range(s) or remain unchanged from previous results.  Please continue with current treatment plan. You do have a slightly positive LISA - meaning you may have a slight predisposition to autoimmune conditions    Franklin Atwood MD  dsb

## 2017-03-13 NOTE — LETTER
Patient:  Dae Castañeda  :   1975  MRN:     [id number]        Ms.Niomi HAKAN Castañeda  [address] [facility] [place] [address]        2017    Dear Ms.Sohrabian Castañeda,    We are writing to inform you of your test results.    Resulted Orders   Endomysial antibody IgA   Result Value Ref Range    Endomysial IgA Antibody       Negative  Reference range: Negative  (Note)  Negative in normal individuals. May be negative in  dermatitis herpatiformis or celiac disease patients  adhering to a gluten free diet.  Laboratory developed test.    Test Performed by:  [address] [address]     Tissue transglutaminase clark IgA and IgG   Result Value Ref Range    Tissue Transglutaminase Antibody IgA 1 <7 U/mL      Comment:      Negative   The tTG-IgA assay has limited utility for patients with decreased levels of   IgA. Screening for celiac disease should include IgA testing to rule out   selective IgA deficiency and to guide selection and interpretation of   serological testing. tTG-IgG testing may be positive in celiac disease   patients   with IgA deficiency.      Tissue Transglutaminase Clark IgG <1  Negative   <7 U/mL   Ceruloplasmin   Result Value Ref Range    Ceruloplasmin 25 23 - 53 mg/dL   Thyroid peroxidase antibody   Result Value Ref Range    Thyroid Peroxidase Antibody <10 <35 IU/mL   TSH   Result Value Ref Range    TSH 0.75 0.40 - 4.00 mU/L   Glutamic acid decarboxylase antibody   Result Value Ref Range    Glutamic Acid Decarboxylase Antibody       <5.0  Reference range: 0.0 to 5.0  Unit: IU/mL  (Note)  INTERPRETIVE INFORMATION:  Glutamic Acid Decarboxylase  Antibody  A value greater than 5.0 IU/mL is considered positive for  Glutamic Acid Decarboxylase Antibody.  Performed by [facility],  [address] [place] [id number] [phone]  [email or web address], Abdi Murray MD, Lab. Director     Pyruvic acid   Result  Value Ref Range    Pyruvic 0.053       Comment:      Reference range: 0.030 to 0.107  Unit: mmol/L  (Note)  Performed by [facility],  [facility] [place] [place] [address] [phone]  [email or web address], Abdi Murray MD, Lab. Director     Antistreptolysin O   Result Value Ref Range    Antistreptolysin O 30 0 - 120 IU/mL      Comment:      A single ASO analysis may not be meaningful due to the variability of ASO   values   within the normal population.  Both clinical and laboratory findings should   be   considered in reaching a diagnosis.  A single analysis may be less   informative   than a repeat analysis showing a change.     Erythrocyte sedimentation rate auto   Result Value Ref Range    Sed Rate 7 0 - 20 mm/h   CRP inflammation   Result Value Ref Range    CRP Inflammation <2.9 0.0 - 8.0 mg/L   Antinuclear antibody screen by [facility]   Result Value Ref Range    LISA Screen by [facility] 1.4 (H) <1.0      Comment:      Interpretation:  Positive   Lactic acid   Result Value Ref Range    Lactic Acid 1.0 0.4 - 2.0 mmol/L       Your test results fall within the expected range(s) or remain unchanged from previous results.  Please continue with current treatment plan.    Franklin Atwood MD  dsb

## 2017-03-13 NOTE — LETTER
"3/13/2017       RE: Dae Castañeda  [address] [facility] [address]     Dear Colleague,    Thank you for referring your patient, Dae Castañeda, to the [facility] at [facility]. Please see a copy of my visit note below.      Summary and Recommendations:     41 yr old French-Polish woman with no family history of like condition.   She has had a variety of symptoms that included light headedness, nausea and involuntary movements.  There is a history of ?\"tremor\" in her MGF.     Episode on prozac  Episode on trazodone  episode after recovering from surgery    On her video which i reviewed she had thrashing of her arms and head - rotating to the right and left which is consistent with a nonepileptic event    These degree of movements are not typically seen with serotonin syndrome, ie. There are usually discrete myoclonic movements rather than thrashing movements.     Will have patient see Dr. Sánchez for neuropsychological testing  May see Chico Lentz to discuss whether or not worth pursuing genetic testing  See psychology for possible CBT or referral if appropriate for this.   Lives in [place].    i discussed the diagnosis of a functional movement disorder with the patient and her spouse Manuel and provided the website: neurosymptoms.org    Franklin Atwood MD  _____________________________________________________________________  Cc: 41 year old female   Consult requested by pcp    Outside records reviewed and revealed  --   She has seen Dr. Mustafa, Dr. Antonio, Dr. Radford and others.       History obtained from patient      History of Present Illness  41 yr old woman with history of abnormal involuntary movements.   Details are documented extensively elsewhere in records that are being scanned into the chart. She also has had a [facility] evaluation with psych and neurology involvement. A functional etiology has been " "raised.    Her second daughter is adopted and her family history is lacking.     The patient is not adopted and in her family     Developmentally normal  No problems with learning or motor problems  Has one year of college - [employer]   No learning problems or add or adhd  Never been on stimulant   Denies urge to move or relief when they occur  When she is going to get an iv in her arm she can control the movement in one year and has a movement in the other arm. She drinks alcohol but not clear that alcohol helps her movements    She has had 7 episodes since age 20 yrs of age    Her mother is living and is living in [place]  Her father passed away from heart attack.  She has two sisters are healthy. One sister has polcystic ovarian disease    Ros:   Vision and hearing is okay  Sleep is okay with melatonin 5mg at night.   Head pressure since the last episode and is on the bifrontal areas and on the top of her head. There is some nausea with that. There is not always nausea with the headache. She had been having lots of headaches and has light headedness and when more pronounced there may be nausea. The light headedness and nausea are more common. There is no change in vision with these. She had double vision in the hospital. There is no scotoma with these headaches. There is no clear sonophobia or photophobia. They are now every few days and take aleve if the headaches hang around a while. She is taking aleve 2 tablets a couple times per week.   Lungs: are okay  Heart: okay  Msk: - reportedly has \"fibromyalgia\" with hip, backs and has had improvement of her symptoms. She does not sit mick cross applesauce and leans back rather than sitting straight up and down. She has seen a rheumatologist in the past and does not recall if there were any immune abnormalities. There are no present muscle pains. She denies changes in her ability in brushing her hair or raising her arms or climbing stairs. She denies sensory " changes.   Endo: denies  Allergies: codeine -   Serotonin drugs may have problems  Skin: denies  Id: denies  Heme: blood issues - denies but does not know if has thalassemia  Mood: she has been relatively good mood wise - she has been on medication. She is taking wellbutrin for fibromyalgia and has been on this for about 7 yrs.   She has been on a variety of SSRIs, paxil, prozac (first one to start the shaking),   Had been on them for depression.   Was on wellbutrin for fibromyalgia. Had seen gary and was encouraged to go off the wellbutrin and had recurrence of pain but no mood issues so went back on it.  There may be a trigger for her symptoms - medications or other things such as exercise. This last time there was no clear trigger before the episode.   Bladder: fine. Denies problems.   Bowels: denies. Has not cut out gluten or milk.     Always seems to get onset in the right quad - she had triggered the problem when she was off the wellbutrin.     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   She had work in a cleaning company  Her spouse is a contractor.     14 Review of systems  are negative except for   Patient Active Problem List   Diagnosis     Chromosome abnormality     Myalgia and myositis     Female infertility associated with anovulation     CARDIOVASCULAR SCREENING; LDL GOAL LESS THAN 160     Mild Depression  [296.21]       Serotonin syndrome     Myoclonic jerking     Fibromyalgia     H/O electroencephalogram     Abnormal involuntary movement     H/O magnetic resonance imaging of brain and brain stem        Allergies   Allergen Reactions     Codeine GI Disturbance     Paroxetine      serotonin syndrome, all seratonin drugs     Venlafaxine      serotonin syndrome     Past Surgical History   Procedure Laterality Date     Hc repair of nasal septum  4/2000     Aspiration and curettage  3/2008     Missed AB following in vitro fertilzation/implantation     Laparoscopy  1/2011     for drainage  intra-abdominal hematoma s/p ruptured right ovarian cyst     Past Medical History   Diagnosis Date     Abnormal involuntary movement 2017     Author: Shaheed Freeman MD Service: Hospitalist Author Type: Physician    Filed: 2015  8:10 PM Note Time: 2015 10:35 AM Status: Signed   : Shaheed Freeman MD (Physician)     Expand All Collapse All    DATE OF ADMISSION:  2015      DATE OF DISCHARGE:  2015      PRIMARY CARE PHYSICIAN:  Nancy Brady MD      CHIEF COMPLAINT:  Involuntary jerky movements.         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 Orders    EEG        Expand All Collapse All    ONE-HOUR VIDEO ELECTROENCEPHALOGRAM       ORDERING PHYSICIAN:  Lito Jasmine MD       EEG #:        This EEG was done on [facility] with video monitoring recording of the spell.  EEG demonstrated some sharp wave activity bilaterally that is not rhythmic.  The patient otherwise had a spell that started with tingling and d     H/O magnetic resonance imaging of brain and brain stem 3/6/2017     2017 normal brain mri     Major depressive disorder, single episode, mild (H)      chronic     Myalgia and myositis, unspecified      episodic low back and hip pain     Ruptured ovarian cyst 2011     right, requiring laparoscopy for drainage intra-abdominal hematoma     Serotonin syndrome      SSRI related (paroxetine and venlaxine), also triggered by anesthetic agents in      Supervision of other normal pregnancy       - vaginal, pergonal for infertility     Social History     Social History     Marital status:      Spouse name: Manuel     Number of children: 1     Years of education: 12     Occupational History     accounting [employer]      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     homemaker      Social History Main Topics     Smoking status: Never Smoker     Smokeless tobacco: Never Used     Alcohol use Yes      Comment: 1 per month     Drug use: No     Sexual activity: Yes     Partners: Male      Comment: same since partner since      Other Topics Concern      Service No     Blood Transfusions No     Caffeine Concern No     Occupational Exposure No     Hobby Hazards No     Sleep Concern No     with zolpidem     Stress Concern No     Weight Concern No     Special Diet No     Back Care No     Exercise Yes     stretching/aerobic 20 min 3 days per week     Bike Helmet Yes     Seat Belt Yes     Self-Exams Yes     Social History Narrative    Lives with  and 9 year old and 2 cats.  Thinking about another pregnancy - In Vitro 2008.    Adopted a 5 years old from [place] in spring 2013.          SOCIAL HISTORY: Nondrinker, nonsmoker. She is a school paraprofessional.           FAMILY HISTORY: Noncontributory to this presentation.           ALLERGIES: Codeine, venlafaxine and Paxil.         FAMILY PSYCHIATRIC HISTORY: Depression in patient's father.           SOCIAL HISTORY: Patient reports she has 2 siblings. She reports she grew up in [place]. She denies any history of abuse. She reports graduating high school and completing some college. She is  and has 2 children, ages 7 and 12. 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 She denies legal issues or criminal history.         lives in [place]. spouse is Manuel     Family History   Problem Relation Age of Onset     Lipids Mother      Thyroid Disease Mother      hypothyroidism     Breast Cancer Mother 61     Depression Father      Hypertension Father      C.A.D. Father       MI age 64     DIABETES Maternal Grandmother      type 2     DIABETES Maternal Grandfather      type 2     C.A.D. Maternal Grandfather      multiple MIs, onset age 50s      Prostate Cancer Paternal Grandfather       age 70s     Current Outpatient Prescriptions   Medication Sig Dispense Refill     IBUPROFEN PO Take 400-800 mg by mouth daily as needed for moderate pain or fever        buPROPion (WELLBUTRIN) 75 MG tablet TAKE 1 TABLET (75 MG) BY MOUTH 2 TIMES DAILY 180 tablet 1       Examination  B/P: Data Unavailable, T: Data Unavailable, P: Data Unavailable, R: Data Unavailable 0 lbs 0 oz  not currently breastfeeding., There is no height or weight on file to calculate BMI.    General examination: well developed, nourished and normal affect  Carotid: No bruits. Chest CTA, Heart regular without gallops or murmurs. Abdomen soft nontender, no masses, bowel sounds intact. Periphery: normal pulses without edema. No skin lesions. MENTAL STATUS:  Alert, oriented x3.  Speech fluent with normal naming, repetition, comprehension.  Good right-left orientation, Can remember 2/3 objects. 5/5 on spelling world backwards.  CRANIAL NERVES:  Disks flat. Pupils are equal, round, reactive to light.  Normal vascularity and fields. Extraocular movements full.  Facial sensation and movement normal.  Hearing intact. Palate moves symmetrically.  Tongue midline.  Sternocleidomastoid and trapezius strength intact.  Neck strength was normal.  NEUROLOGIC:  Tone: normal. Motor in upper and lower extremities. 5/5.  Reflexes 2/4.  Toe signs downgoing.  Good finger-nose-finger, fine finger movement, heel-shin maneuver, sensation to light touch, position sense and vibration and temperature was normal. Gait normal. Romberg and postural stability astasia abasia Tremor subtle tremor        Eddi Ramirez MD   Psychiatry       PSYCHIATRIC CONSULTATION       DATE OF ADMISSION: 2017.       DATE OF SERVICE: 2017.       REASON FOR CONSULTATION: Anxiety, presents with myoclonic jerks.       REQUESTING PHYSICIAN: Nataliya Cooper MD       IDENTIFYING DATA: Dae Castañeda is a 41-year-old  "woman who reports she has been  to her  for the last 17 years. They have 2 daughters, ages 7 and 12 and she reports she works as a paraprofessional for the [employer], a job she has held for the last 1-1/2 years. She reports some college education. She states she does not have ongoing contact with psychiatry or psychotherapist. She presented to the ED here at [facility] via EMS on account of seizure-like activity. Psychiatry was consulted to render an opinion on her myoclonic spasms which are thought to be anxiety related. Information was gathered through direct patient contact as well as collateral information provided by her  who was present throughout this interview and chart review.       CHIEF COMPLAINT: \"My neurologist thinks I may have serotonin syndrome.\"       HISTORY OF PRESENT ILLNESS: Dae Castañeda reports an established history of fibromyalgia. She reports she was previously treated for major depressive disorder in her late teens and early 20s. She states that she was on Paxil about the age of 21 and it was at that time that she experienced her first neurological presentation. She reports she experienced hallucinations and bizarre delusions where she thought her family members were aliens. She reports that she was also having muscular spasms at the time and this landed her in the ICU for about 6 days. She states that since then, she has had multiple episodes of this unusual phenomenon where she states she experiences spasm of her muscles and finds herself having intense seizure-like activity, but she does not lose consciousness. She states \"it is very difficult to watch for bystanders \"as I flop around a lot.\" During this episode, however, the patient reports she had been dealing with some type of upper respiratory tract infection over the weekend and had used Benadryl to facilitate sleep on 2 consecutive nights. She states she was ultimately " able to return to work yesterday and while there, she felt lightheaded and was leaning on things to support herself. 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 She stated that on the way to the nurse's office, she was helped by a couple of people, but she realized that she was about to faint, so she essentially lowered herself to the floor. She states the [facility] nurse came to see her and it was at that point that she realized that she was going to have one of her episodes. She claims her legs started to contract at which point she claims she advised the school nurse of her history and advised her that she was not a seizure patient but that she was going to be flopping around and having seizure-like activity in the next few minutes. She claims she advised the nurse to call 911 and protect herself from injury. She claims she subsequently developed severe spasms in her legs and whole body convulsions in addition to hyperventilation. While en route to the Emergency Department, the patient received Versed. She states she sees Dr. Donny Mustafa at [facility] in [place] and he had advised that anytime she has any of these episodes that they should record it and have CK's and aldolase collected.       With respect to ongoing psychosocial stressors, the patient denies any active problems or stressors. She does not endorse neurovegetative symptoms of depression. She denies experiencing symptoms of tom or psychosis. She does not endorse financial or marital stress. She denies any work related issues. She reports that she has noticed that she developed similar symptoms doing vigorous physical exercise lower or extreme muscle strain. She states she takes Wellbutrin for her fibromyalgia. She denies use of alcohol or illicit chemicals. Urine toxicology screen on admission was positive for benzodiazepines. Lactic acid was low at 0.5 with CK normal at  "78. A BMP was within normal limits with mildly elevated chloride of 110 and calcium of 8.0.       PAST PSYCHIATRIC HISTORY: As described in the history of present illness.       CHEMICAL USE HISTORY: None reported.       PAST MEDICAL HISTORY:   1. Fibromyalgia.   2. History of infertility.   3. History of deviated septum.   4. History of serotonin syndrome.       PAST SURGICAL HISTORY:   1. History of repair of nasal septum.   2. History of aspiration curettage.   3. History of laparoscopic drainage of intra-abdominal hematoma, status post ruptured right ovarian cyst.       MEDICATIONS PRIOR TO ADMISSION:   1. Wellbutrin 75 mg p.o. b.i.d.   2. Zanaflex 2 mg p.o. b.i.d. p.r.n. muscle spasms.   3. Ibuprofen 400-800 mg daily p.r.n. moderate pain.       ALLERGIES: Codeine, Paxil, Effexor.       FAMILY PSYCHIATRIC HISTORY: Depression in patient's father.       SOCIAL HISTORY: Patient reports she has 2 siblings. She reports she grew up in [place]. She denies any history of abuse. She reports graduating high school and completing some college. She is  and has 2 children, ages 7 and 12. 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 She denies legal issues or criminal history.       REVIEW OF SYSTEMS: I refer you to the 10-point review of systems completed by Nataliya Cooper MD on 01/23/2017, which remains unchanged.       VITAL SIGNS: Blood pressure 90/57, pulse 83, respirations 16, temperature 98.       MENTAL STATUS EXAMINATION: This is a bespectacled, middle-aged woman who appears younger than her stated age of 41. She is seen at her bedside where she is sitting up in bed. Her  is present during this interview. She makes good eye contact and she speaks clearly and coherently. Her mood is described as \"good\" and her affect is full range. Her thought process is logical, relevant and goal directed. There is no evidence of psychosis. She denies the presence of auditory or visual " hallucinations. She denies suicidal or homicidal ideations. She is future oriented. She is alert and oriented to time, place and person and her recall recent and remote events is adequate. Her gait and station are not assessed as she is bedbound. Muscle strength is adequate. Her fund of knowledge is adequate. Her language is appropriate. She displays normal associations. Her recall of recent and remote events is adequate. Her attention and concentration are good. Her impulse control is fair. She displays adequate insight and judgment. Risk assessment at this time is considered low.       DIAGNOSTIC IMPRESSION: Jyoti Castañeda is a 41-year-old woman with prior history of depression who presented to the hospital on account of myoclonic jerks with no associated loss of consciousness who is being evaluated to rule out underlying anxiety or depression. The patient denies neurovegetative symptoms of depression or symptoms suggestive of panic attacks, obsessions, compulsions or posttraumatic stress disorder.      DIAGNOSES:  1. Rule out Functional Neurological Symptom Disorder (Converiosn disorder).   2. Myoclonic jerks.   3. History of fibromyalgia.       RECOMMENDATIONS:   1. Medical management as you are.   2. It is very unclear if the patient's symptoms are psychological in nature as her  has witnessed multiple episodes of these, and she is currently under the care of a neurologist. It was explained to her that it is unlikely that Wellbutrin would cause her to experience the symptoms she presented with as Wellbutrin works more through dopamine than serotonin. The patient did not feel that her medications were responsible but could not explain her symptoms. It is therefore suggested that she follow up with her outpatient neurologist as scheduled, as she currently does not present with any acute psychiatric symptoms that would warrant attention or medication management. No further recommendations are given  from a psychiatric perspective.       Thanks for the consult.           HELEN MCMILLAN MD               D: 01/24/2017 16:09 T: 01/24/2017 17:02 MT: TD           Consults  Date of Service: 1/23/2017 2:36 PM  Note Created: 1/23/2017 3:29 PM  Jody Radford MD   Neurology       REQUESTING PHYSICIAN: None stated.       CHIEF COMPLAINT: Evaluate for body tremors, rule out seizures.       HISTORY OF PRESENT ILLNESS: Ms. Dae Castañeda is a 41-year-old right-handed lady who presents for uncontrollable body movements which started to occur today. This is not the first time she has had symptoms like that. Over the last few days she had cold and sore throat and low-grade fever. 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 She now continues to have spells. She is awake and talking to me as she is having intermittent body shivering including more of her legs and occasionally arms and occasionally movements of her head.       She states that she does not lose consciousness and does not become disoriented, does not bite her tongue.       Previously she had similar spells when she was working out. Then she was taking Paxil. She had been admitted to our hospital in 05/2015 and had EEG video monitoring for the same symptoms and did appear to have nonepileptic spells. She had an MRI of the brain in 2015 which, as well, has been normal. She had been seen by neurologist, Dr. Ramsey Hines, who recommended that she would have drawing of her CPK and aldolase if she had spells like that in the future. There was never a diagnosis of seizure disorder. Looking at evaluation in Neurology in our hospital in 05/2015, nonepileptic spells where seen. The patient essentially is having the same symptoms. Today she is slightly hypotensive, afebrile and she does have a slightly  elevated lactate.       CURRENT MEDICATIONS: Wellbutrin 75 mg twice a day.       SOCIAL HISTORY: Nondrinker, nonsmoker. She is a school paraprofessional.       FAMILY HISTORY: Noncontributory to this presentation.       ALLERGIES: Codeine, venlafaxine and Paxil.       REVIEW OF ORGANS AND SYSTEMS: Positive for fibromyalgia and symptoms of depression. She does take bupropion for that.       Review of organs and systems shows for neck pain and migrating body pain related to fibromyalgia.       CURRENT PHYSICAL EXAMINATION:   VITAL SIGNS: Show a blood pressure of 95/65, respiratory rate is 12, heart rate of 105-112.   GENERAL: The patient is in no acute distress, but she does have intermittent body tremors.   NEUROLOGIC: She is awake and alert x3 with clear speech and language function. Mental status is intact. Motor examination shows symmetrical bulk and tone with no evidence of focal weakness. Reflexes are symmetrical. Toes are downgoing. Sensory examination is intact to pinprick and vibration. Coordination is intact to finger-nose-finger.       IMPRESSION: This lady presents with episodes of body tremors which appeared to be intentional. I do not see any evidence of seizures. This is a recurrent problem that had been happening before. The presentation clinically highly suggests anxiety, panic attack or nonepileptic spells. Neurologically, I will do CPK and aldolase which was mentioned by Dr. Ramsey Hines who patient sees in outpatient Neurology. We most likely will see elevation of CPK, as patient having active body movements.       I do recommend that the patient will receive Ativan, per dosing by the Hospitalist. She will have increase in blood pressure with IV hydration, and I do recommend Psychiatry consult.       After review of the chart and clinical history, this is not presentation due to neurological condition, and I would recommend mental health providers to be involved here. I discussed all the above with  the patient and her . I will plan to follow on the lab results.           CARINA HILTON MD               D: 2017 14:36 T: 2017 15:29 MT: HARDEEP       Name: GARDENIA WISEMAN   MRN: [id number] Account: [id number]   : 1975 Consult Date: 2017       Document: [id number]           MRI BRAIN WITHOUT AND WITH CONTRAST 2015 5:10 PM     HISTORY: Myoclonic jerking, seizure.     TECHNIQUE: Multiplanar, multisequence MRI of the brain without and  with 5 mL Gadavist.     COMPARISON: None.     FINDINGS: The brain parenchyma, ventricles and subarachnoid spaces  appear normal. There is no evidence of hemorrhage, mass, acute  infarct, or anomaly. There are no gadolinium enhancing lesions.   Incidental note is made of a high-signal intensity T1 and T2 lesion in  the left temporal bone which is probably an incidental cholesterol  granuloma. There is no evidence for any hippocampal or medial temporal  lobe lesion.     IMPRESSION  IMPRESSION: Normal MRI of the brain.     BARBARA GÓMEZ MD    EEG         ONE-HOUR VIDEO ELECTROENCEPHALOGRAM       ORDERING PHYSICIAN: Ketan Jasmine MD       EEG #:        This EEG was done on Gardenia Lamas with video monitoring recording of the spell. EEG demonstrated some sharp wave activity bilaterally that is not rhythmic. The patient otherwise had a spell that started with tingling and demonstrated really no electrographic seizures during the spells consistent of moving of the head from side-to-side with significant movement of the shoulders from side-to-side, no further spells were seen on this EEG.       IMPRESSION: Spell without electrographic correlation, but some sharp wave activities seen on this EEG, so at this point it is inconclusive whether the patient has seizures or not, but this spell that was recorded was not epileptiform.           KETAN JASMINE MD, PHD, FA               D: 2015 12:52 T: 2015 14:15 MT:  Elyssa Perez MD - 2015 2:04 PM CDT  Formatting of this note may be different from the original.  Progress Notes signed by Elyssa Antonio MD at 05/11/15 1521   Author: Elyssa Antonio MD Service: (none) Author Type: Physician   Filed: 05/11/15 1521 Note Time: 05/11/15 1453 Status: Signed   : Elyssa Antonio MD (Physician)       NAME: GARDENIA WISEMAN MR#: [id number]  CSN: [id number]  AUTHENTICATING CLINICIAN: Elyssa Antonio MD  CONFIRM #: [id number]  LOC: [id number]    CLINIC CONSULTATION    DATE OF CONSULTATION: 2015  : 1975    REQUESTING PHYSICIAN:  Brianna Amaya MD    Gardenia Castañeda is a pleasant 40-year-old female who comes in for evaluation of leg twitching. The patient states that she has a prior history of fibromyalgia. In the past, she had fairly significant pain. However, she has done much better in the last 10 years or so. She states that recently she decided to try working out for the first time in a long time. She developed some issues that were felt to relate to problems with her IT band and knee. She was seen by Physical Therapy, and they were assessing her weakness. Apparently, in probing her knee, involuntary jumping of her quadriceps was noted. It was not expected, and she was advised to see a physician. The patient was seen at the [facility], where the findings were reproducible but the etiology was not clear. The patient has not had weakness of her legs. She has not had loss of muscle bulk. She does not have tingling or numbness. She does not have pain in her back or radiating down her leg, but she does have some knee issues and ongoing knee pain. The patient states that there are certain maneuvers such as doing a bridge pose that are likely to induce the problems. She does think that she is having a little bit of this twitching on the left side as well. The patient does not have a prior history of muscle twitching or  fasciculations. She has not had any laboratory evaluation per se but had recent routine assessment at [place] and thinks that her thyroid was done at that time. The patient was seen by Dr. Lama from Orthopedics in April, who notes that she had a right knee MRI with moderate edema in the Hoffa's fat pad. A V sling and possible steroid injection were discussed. Neurologic evaluation was suggested for the fasciculation.    MEDICAL HISTORY:  Significant for fibromyalgia. The patient has a history of a number of episodes of serotonin syndrome and apparently is felt to be very sensitive to medications. The last episode was 4 years ago. She has been in the ICU because of serotonin syndrome. She has a history of an ovarian cyst. She currently takes Wellbutrin and p.r.n. Norco.    ALLERGIES:  Codeine. She has a notation in her chart that she cannot take medications that increase the serotonin level. When I ask about specific medications, she recalls Paxil but tells me that there are others that have caused her trouble.    SOCIAL HISTORY:  The patient has 2 children. She does not use tobacco. She occasionally uses alcohol. She has a caffeinated beverage per day. She is .    FAMILY HISTORY:  Negative for neuromuscular disorders.    REVIEW OF SYSTEMS:  No back pain. No numbness or tingling. No weakness. No bowel or bladder issues. She has knee pain, muscle twitching as described. No issues with her speech or swallowing. No vision change. No chest pain or palpitations. No shortness of breath.    EXAMINATION:  She is alert, very pleasant female who provides her own history.   Her blood pressure is 111/68, pulse 84, respirations 12. She is 113 pounds.   NECK: Supple. Carotids without bruits.   HEART: Regular in rate and rhythm.   EXTREMITIES: Without edema.   LUNGS: Clear.   NEUROLOGIC EXAMINATION: The patient is alert. Her speech is fluent. She is oriented to her history. She follows commands without difficulty. Cranial  nerves, extraocular movements are intact. Visual fields are full. Pupils are equal and reactive. Funduscopic examination is unremarkable. Face symmetric in expression, sensation. Tongue is midline. No tongue fasciculations. Palate elevates symmetrically. Sternocleidomastoid and trapezius muscles are strong. On motor examination, there is 5/5 muscle strength with normal bulk, normal tone. No atrophy and no spontaneous fasciculations are appreciated. Sensation intact to light touch, temperature, vibration, and position as well as pinprick. Reflexes are symmetric, and toes are downgoing. She demonstrates normal finger-nose-finger, fine finger movements, and heel-knee-shin. Patient is able to stand and ambulate without assistance. She can walk on her heels and toes. The patient can stand up and sit down several times. This does not induce any issue. At the conclusion of this examination, the patient indicates that my examination would not consist of the maneuvers that induce the symptoms. At this point, the patient does demonstrate by lying supine and pressing into the medial aspect of her lower knee. This induces a repetitive contraction of her quadriceps. The patient indicates that often it is the thigh adductor. The twitching continues until the patient flexes her knee and extends it again, and appears to be involuntary.    IMPRESSION:  Patient presents with involuntary repetitive contraction of the muscles in her right thigh, apparently induced by maneuvers of her right knee. I really do not have a neurologic explanation to offer. In the absence of what appears to be a mechanical maneuver with the right knee, I would be concerned for a structural and potentially neurogenic process in the lumbar spine or pelvis. I would be looking for a peripheral more than a central process. The patient has already had an EMG, which was nondiagnostic. For completeness, imaging of the lumbar spine and pelvis could be performed to  ensure that there is not a structural abnormality that might be causing these symptoms. Certainly re-evaluation would be warranted if the patient were to develop weakness, atrophy, or more diffuse symptoms. The patient is understanding of the fact that a good explanation for her symptoms is not provided to her today. I will see if any of my colleagues have encountered this issue. I would say that it is reassuring that her neurologic examination is essentially normal. For now, followup will be as needed. I will plan on contacting Dae with her MRI results.    ZM:MARIPOSA C:   D:05/11/15 14:04 T: 05/11/15 14:53 CONFIRM #: [id number]       Telephone Encounter - Elyssa Antonio MD - 05/12/2015 1:20 PM CDT  Formatting of this note may be different from the original.  Telephone Encounter by Elyssa Antonio MD at 05/12/15 1318   Author: Elyssa Antonio MD Service: (none) Author Type: Physician   Filed: 05/12/15 1329 Note Time: 05/12/15 1318 Status: Signed   : Elyssa Antonio MD (Physician)       this pt was seen yesterday for focal twitching of a r thigh muscle    today at outside er; ed doc called to say pt has leg twitching after working out and also arm and head thrashing, no loc  she is awake and talking, and likely will be admit to [facility]  informed that this was not akin to her problem yesterday  has ho serotonin syndrome and they are wondering about that    likely admit to [facility] and i suggest mri brain and eeg; consider functional etiology        Again, thank you for allowing me to participate in the care of your patient.      Sincerely,    Franklin Atwood MD

## 2017-03-13 NOTE — PATIENT INSTRUCTIONS
"41 yr old [place] woman with no family history of like condition.   She has had a variety of symptoms that included light headedness, nausea and involuntary movements.  There is a history of ?\"tremor\" in her MGF.     Episode on prozac  Episode on trazodone  episode after recovering from surgery    On her video which i reviewed she had thrashing of her arms and head - rotating to the right and left which is consistent with a nonepileptic event    These degree of movements are not typically seen with serotonin syndrome, ie. There are usually discrete myoclonic movements rather than thrashing movements.     Will have patient see Dr. Sánchez for neuropsychological testing  May see [place] Lentz to discuss whether or not worth pursuing genetic testing  See psychology for possible CBT or referral if appropriate for this.   Lives in [place].    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

## 2017-03-13 NOTE — PROGRESS NOTES
"  Summary and Recommendations:     41 yr old Pashto-Polish woman with no family history of like condition.   She has had a variety of symptoms that included light headedness, nausea and involuntary movements.  There is a history of ?\"tremor\" in her MGF.     Episode on prozac  Episode on trazodone  episode after recovering from surgery    On her video which i reviewed she had thrashing of her arms and head - rotating to the right and left which is consistent with a nonepileptic event    These degree of movements are not typically seen with serotonin syndrome, ie. There are usually discrete myoclonic movements rather than thrashing movements.     Will have patient see Dr. Sánchez for neuropsychological testing  May see Chico Lentz to discuss whether or not worth pursuing genetic testing  See psychology for possible CBT or referral if appropriate for this.   Lives in [place].    i discussed the diagnosis of a functional movement disorder with the patient and her spouse Manuel and provided the website: [email or web address].org    Franklin Atwood MD  _____________________________________________________________________  Cc: 41 year old female   Consult requested by pcp    Outside records reviewed and revealed  --   She has seen Dr. Mustafa, Dr. Antonio, Dr. Radford and others.       History obtained from patient      History of Present Illness  41 yr old woman with history of abnormal involuntary movements.   Details are documented extensively elsewhere in records that are being scanned into the chart. She also has had a recent [facility] evaluation with psych and neurology involvement. A functional etiology has been raised.    Her second daughter is adopted and her family history is lacking.     The patient is not adopted and in her family     Developmentally normal  No problems with learning or motor problems  Has one year of college - [employer]   No learning problems or add or adhd  Never been on stimulant   Denies urge " "to move or relief when they occur  When she is going to get an iv in her arm she can control the movement in one year and has a movement in the other arm. She drinks alcohol but not clear that alcohol helps her movements    She has had 7 episodes since age 20 yrs of age    Her mother is living and is living in [place]  Her father passed away from heart attack.  She has two sisters are healthy. One sister has polcystic ovarian disease    Ros:   Vision and hearing is okay  Sleep is okay with melatonin 5mg at night.   Head pressure since the last episode and is on the bifrontal areas and on the top of her head. There is some nausea with that. There is not always nausea with the headache. She had been having lots of headaches and has light headedness and when more pronounced there may be nausea. The light headedness and nausea are more common. There is no change in vision with these. She had double vision in the hospital. There is no scotoma with these headaches. There is no clear sonophobia or photophobia. They are now every few days and take aleve if the headaches hang around a while. She is taking aleve 2 tablets a couple times per week.   Lungs: are okay  Heart: okay  Msk: - reportedly has \"fibromyalgia\" with hip, backs and has had improvement of her symptoms. She does not sit mick cross applesauce and leans back rather than sitting straight up and down. She has seen a rheumatologist in the past and does not recall if there were any immune abnormalities. There are no present muscle pains. She denies changes in her ability in brushing her hair or raising her arms or climbing stairs. She denies sensory changes.   Endo: denies  Allergies: codeine -   Serotonin drugs may have problems  Skin: denies  Id: denies  Heme: blood issues - denies but does not know if has thalassemia  Mood: she has been relatively good mood wise - she has been on medication. She is taking wellbutrin for fibromyalgia and has been on this for " about 7 yrs.   She has been on a variety of SSRIs, paxil, prozac (first one to start the shaking),   Had been on them for depression.   Was on wellbutrin for fibromyalgia. Had seen gary and was encouraged to go off the wellbutrin and had recurrence of pain but no mood issues so went back on it.  There may be a trigger for her symptoms - medications or other things such as exercise. This last time there was no clear trigger before the episode.   Bladder: fine. Denies problems.   Bowels: denies. Has not cut out gluten or milk.     Always seems to get onset in the right quad - she had triggered the problem when she was off the wellbutrin.     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   She had work in a cleaning company  Her spouse is a contractor.     14 Review of systems  are negative except for   Patient Active Problem List   Diagnosis     Chromosome abnormality     Myalgia and myositis     Female infertility associated with anovulation     CARDIOVASCULAR SCREENING; LDL GOAL LESS THAN 160     Mild Depression  [296.21]       Serotonin syndrome     Myoclonic jerking     Fibromyalgia     H/O electroencephalogram     Abnormal involuntary movement     H/O magnetic resonance imaging of brain and brain stem        Allergies   Allergen Reactions     Codeine GI Disturbance     Paroxetine      serotonin syndrome, all seratonin drugs     Venlafaxine      serotonin syndrome     Past Surgical History   Procedure Laterality Date     Hc repair of nasal septum  4/2000     Aspiration and curettage  3/2008     Missed AB following in vitro fertilzation/implantation     Laparoscopy  1/2011     for drainage intra-abdominal hematoma s/p ruptured right ovarian cyst     Past Medical History   Diagnosis Date     Abnormal involuntary movement 1/31/2017     Author: Shaheed Freeman MD Service: Hospitalist Author Type: Physician    Filed: 5/14/2015  8:10 PM Note Time: 5/14/2015 10:35 AM Status: Signed   : Pete  Shaheed PAYNE MD (Physician)     Expand All Collapse All    DATE OF ADMISSION:  2015      DATE OF DISCHARGE:  2015      PRIMARY CARE PHYSICIAN:  Nancy Brady MD      CHIEF COMPLAINT:  Involuntary jerky movements.         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 Orders    EEG        Expand All Collapse All    ONE-HOUR VIDEO ELECTROENCEPHALOGRAM       ORDERING PHYSICIAN:  Lito Jasmine MD       EEG #:        This EEG was done on [facility] with video monitoring recording of the spell.  EEG demonstrated some sharp wave activity bilaterally that is not rhythmic.  The patient otherwise had a spell that started with tingling and d     H/O magnetic resonance imaging of brain and brain stem 3/6/2017     2017 normal brain mri     Major depressive disorder, single episode, mild (H)      chronic     Myalgia and myositis, unspecified      episodic low back and hip pain     Ruptured ovarian cyst 2011     right, requiring laparoscopy for drainage intra-abdominal hematoma     Serotonin syndrome      SSRI related (paroxetine and venlaxine), also triggered by anesthetic agents in      Supervision of other normal pregnancy       - vaginal, pergonal for infertility     Social History     Social History     Marital status:      Spouse name: Manuel     Number of children: 1     Years of education: 12     Occupational History     [employer]     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     homemaker      Social History Main Topics     Smoking status: Never Smoker     Smokeless tobacco: Never Used     Alcohol use Yes      Comment: 1 per month     Drug use: No     Sexual activity: Yes     Partners: Male      Comment: same since partner since       Other Topics Concern      Service No     Blood Transfusions No     Caffeine Concern No     Occupational Exposure No     Hobby Hazards No     Sleep Concern No     with zolpidem     Stress Concern No     Weight Concern No     Special Diet No     Back Care No     Exercise Yes     stretching/aerobic 20 min 3 days per week     Bike Helmet Yes     Seat Belt Yes     Self-Exams Yes     Social History Narrative    Lives with  and 9 year old and 2 cats.  Thinking about another pregnancy - In Vitro 2008.    Adopted a 5 years old from [place] in spring 2013.          SOCIAL HISTORY: Nondrinker, nonsmoker. She is a school paraprofessional.           FAMILY HISTORY: Noncontributory to this presentation.           ALLERGIES: Codeine, venlafaxine and Paxil.         FAMILY PSYCHIATRIC HISTORY: Depression in patient's father.           SOCIAL HISTORY: Patient reports she has 2 siblings. She reports she grew up in [place]. She denies any history of abuse. She reports graduating high school and completing some college. She is  and has 2 children, ages 7 and 12. 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 She denies legal issues or criminal history.         lives in [place]. spouse is Manuel     Family History   Problem Relation Age of Onset     Lipids Mother      Thyroid Disease Mother      hypothyroidism     Breast Cancer Mother 61     Depression Father      Hypertension Father      C.A.D. Father       MI age 64     DIABETES Maternal Grandmother      type 2     DIABETES Maternal Grandfather      type 2     C.A.D. Maternal Grandfather      multiple MIs, onset age 50s     Prostate Cancer Paternal Grandfather       age 70s     Current Outpatient Prescriptions   Medication Sig Dispense Refill     IBUPROFEN PO Take 400-800 mg by mouth daily as needed for moderate pain or fever        buPROPion (WELLBUTRIN) 75 MG tablet TAKE 1 TABLET (75 MG) BY MOUTH 2 TIMES DAILY 180 tablet 1        Examination  B/P: Data Unavailable, T: Data Unavailable, P: Data Unavailable, R: Data Unavailable 0 lbs 0 oz  not currently breastfeeding., There is no height or weight on file to calculate BMI.    General examination: well developed, nourished and normal affect  Carotid: No bruits. Chest CTA, Heart regular without gallops or murmurs. Abdomen soft nontender, no masses, bowel sounds intact. Periphery: normal pulses without edema. No skin lesions. MENTAL STATUS:  Alert, oriented x3.  Speech fluent with normal naming, repetition, comprehension.  Good right-left orientation, Can remember 2/3 objects. 5/5 on spelling world backwards.  CRANIAL NERVES:  Disks flat. Pupils are equal, round, reactive to light.  Normal vascularity and fields. Extraocular movements full.  Facial sensation and movement normal.  Hearing intact. Palate moves symmetrically.  Tongue midline.  Sternocleidomastoid and trapezius strength intact.  Neck strength was normal.  NEUROLOGIC:  Tone: normal. Motor in upper and lower extremities. 5/5.  Reflexes 2/4.  Toe signs downgoing.  Good finger-nose-finger, fine finger movement, heel-shin maneuver, sensation to light touch, position sense and vibration and temperature was normal. Gait normal. Romberg and postural stability astasia abasia Tremor subtle tremor        Eddi Ramirez MD   Psychiatry       PSYCHIATRIC CONSULTATION       DATE OF ADMISSION: 01/23/2017.       DATE OF SERVICE: 01/24/2017.       REASON FOR CONSULTATION: Anxiety, presents with myoclonic jerks.       REQUESTING PHYSICIAN: Nataliya Cooper MD       IDENTIFYING DATA: Dae Castañeda is a 41-year-old woman who reports she has been  to her  for the last 17 years. They have 2 daughters, ages 7 and 12 and she reports she works as a paraprofessional for the [employer], a job she has held for the last 1-1/2 years. She reports some college education. She states she does not have ongoing  "contact with psychiatry or psychotherapist. She presented to the ED here at [facility] via EMS on account of seizure-like activity. Psychiatry was consulted to render an opinion on her myoclonic spasms which are thought to be anxiety related. Information was gathered through direct patient contact as well as collateral information provided by her  who was present throughout this interview and chart review.       CHIEF COMPLAINT: \"My neurologist thinks I may have serotonin syndrome.\"       HISTORY OF PRESENT ILLNESS: Dae Castañeda reports an established history of fibromyalgia. She reports she was previously treated for major depressive disorder in her late teens and early 20s. She states that she was on Paxil about the age of 21 and it was at that time that she experienced her first neurological presentation. She reports she experienced hallucinations and bizarre delusions where she thought her family members were aliens. She reports that she was also having muscular spasms at the time and this landed her in the ICU for about 6 days. She states that since then, she has had multiple episodes of this unusual phenomenon where she states she experiences spasm of her muscles and finds herself having intense seizure-like activity, but she does not lose consciousness. She states \"it is very difficult to watch for bystanders \"as I flop around a lot.\" During this episode, however, the patient reports she had been dealing with some type of upper respiratory tract infection over the weekend and had used Benadryl to facilitate sleep on 2 consecutive nights. She states she was ultimately able to return to work yesterday and while there, she felt lightheaded and was leaning on things to support herself. 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 She stated that on the way to the nurse's office, she was helped by a couple of people, but she " realized that she was about to faint, so she essentially lowered herself to the floor. She states the school nurse came to see her and it was at that point that she realized that she was going to have one of her episodes. She claims her legs started to contract at which point she claims she advised the school nurse of her history and advised her that she was not a seizure patient but that she was going to be flopping around and having seizure-like activity in the next few minutes. She claims she advised the nurse to call 911 and protect herself from injury. She claims she subsequently developed severe spasms in her legs and whole body convulsions in addition to hyperventilation. While en route to the Emergency Department, the patient received Versed. She states she sees Dr. Donny Mustafa at [facility] [place] of Neurology in [place] and he had advised that anytime she has any of these episodes that they should record it and have CK's and aldolase collected.       With respect to ongoing psychosocial stressors, the patient denies any active problems or stressors. She does not endorse neurovegetative symptoms of depression. She denies experiencing symptoms of tom or psychosis. She does not endorse financial or marital stress. She denies any work related issues. She reports that she has noticed that she developed similar symptoms doing vigorous physical exercise lower or extreme muscle strain. She states she takes Wellbutrin for her fibromyalgia. She denies use of alcohol or illicit chemicals. Urine toxicology screen on admission was positive for benzodiazepines. Lactic acid was low at 0.5 with CK normal at 78. A BMP was within normal limits with mildly elevated chloride of 110 and calcium of 8.0.       PAST PSYCHIATRIC HISTORY: As described in the history of present illness.       CHEMICAL USE HISTORY: None reported.       PAST MEDICAL HISTORY:   1. Fibromyalgia.   2. History of infertility.   3. History of  "deviated septum.   4. History of serotonin syndrome.       PAST SURGICAL HISTORY:   1. History of repair of nasal septum.   2. History of aspiration curettage.   3. History of laparoscopic drainage of intra-abdominal hematoma, status post ruptured right ovarian cyst.       MEDICATIONS PRIOR TO ADMISSION:   1. Wellbutrin 75 mg p.o. b.i.d.   2. Zanaflex 2 mg p.o. b.i.d. p.r.n. muscle spasms.   3. Ibuprofen 400-800 mg daily p.r.n. moderate pain.       ALLERGIES: Codeine, Paxil, Effexor.       FAMILY PSYCHIATRIC HISTORY: Depression in patient's father.       SOCIAL HISTORY: Patient reports she has 2 siblings. She reports she grew up in [place]. She denies any history of abuse. She reports graduating high school and completing some college. She is  and has 2 children, ages 7 and 12. 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 She denies legal issues or criminal history.       REVIEW OF SYSTEMS: I refer you to the 10-point review of systems completed by Natlaiya Cooper MD on 01/23/2017, which remains unchanged.       VITAL SIGNS: Blood pressure 90/57, pulse 83, respirations 16, temperature 98.       MENTAL STATUS EXAMINATION: This is a bespectacled, middle-aged woman who appears younger than her stated age of 41. She is seen at her bedside where she is sitting up in bed. Her  is present during this interview. She makes good eye contact and she speaks clearly and coherently. Her mood is described as \"good\" and her affect is full range. Her thought process is logical, relevant and goal directed. There is no evidence of psychosis. She denies the presence of auditory or visual hallucinations. She denies suicidal or homicidal ideations. She is future oriented. She is alert and oriented to time, place and person and her recall recent and remote events is adequate. Her gait and station are not assessed as she is bedbound. Muscle strength is adequate. Her fund of knowledge is adequate. Her " language is appropriate. She displays normal associations. Her recall of recent and remote events is adequate. Her attention and concentration are good. Her impulse control is fair. She displays adequate insight and judgment. Risk assessment at this time is considered low.       DIAGNOSTIC IMPRESSION: Jyoti Castañeda is a 41-year-old woman with prior history of depression who presented to the hospital on account of myoclonic jerks with no associated loss of consciousness who is being evaluated to rule out underlying anxiety or depression. The patient denies neurovegetative symptoms of depression or symptoms suggestive of panic attacks, obsessions, compulsions or posttraumatic stress disorder.      DIAGNOSES:  1. Rule out Functional Neurological Symptom Disorder (Converiosn disorder).   2. Myoclonic jerks.   3. History of fibromyalgia.       RECOMMENDATIONS:   1. Medical management as you are.   2. It is very unclear if the patient's symptoms are psychological in nature as her  has witnessed multiple episodes of these, and she is currently under the care of a neurologist. It was explained to her that it is unlikely that Wellbutrin would cause her to experience the symptoms she presented with as Wellbutrin works more through dopamine than serotonin. The patient did not feel that her medications were responsible but could not explain her symptoms. It is therefore suggested that she follow up with her outpatient neurologist as scheduled, as she currently does not present with any acute psychiatric symptoms that would warrant attention or medication management. No further recommendations are given from a psychiatric perspective.       Thanks for the consult.           HELEN MCMILLAN MD               D: 01/24/2017 16:09 T: 01/24/2017 17:02 MT: EVER           Consults  Date of Service: 1/23/2017 2:36 PM  Note Created: 1/23/2017 3:29 PM  Jody Radford MD   Neurology       REQUESTING PHYSICIAN: None  stated.       CHIEF COMPLAINT: Evaluate for body tremors, rule out seizures.       HISTORY OF PRESENT ILLNESS: Ms. Dae Castañeda is a 41-year-old right-handed lady who presents for uncontrollable body movements which started to occur today. This is not the first time she has had symptoms like that. Over the last few days she had cold and sore throat and low-grade fever. 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 She now continues to have spells. She is awake and talking to me as she is having intermittent body shivering including more of her legs and occasionally arms and occasionally movements of her head.       She states that she does not lose consciousness and does not become disoriented, does not bite her tongue.       Previously she had similar spells when she was working out. Then she was taking Paxil. She had been admitted to our hospital in 05/2015 and had EEG video monitoring for the same symptoms and did appear to have nonepileptic spells. She had an MRI of the brain in 2015 which, as well, has been normal. She had been seen by neurologist, Dr. Ramsey Hines, who recommended that she would have drawing of her CPK and aldolase if she had spells like that in the future. There was never a diagnosis of seizure disorder. Looking at evaluation in Neurology in our hospital in 05/2015, nonepileptic spells where seen. The patient essentially is having the same symptoms. Today she is slightly hypotensive, afebrile and she does have a slightly elevated lactate.       CURRENT MEDICATIONS: Wellbutrin 75 mg twice a day.       SOCIAL HISTORY: Nondrinker, nonsmoker. She is a school paraprofessional.       FAMILY HISTORY: Noncontributory to this presentation.       ALLERGIES: Codeine, venlafaxine and Paxil.       REVIEW OF ORGANS AND SYSTEMS: Positive for  fibromyalgia and symptoms of depression. She does take bupropion for that.       Review of organs and systems shows for neck pain and migrating body pain related to fibromyalgia.       CURRENT PHYSICAL EXAMINATION:   VITAL SIGNS: Show a blood pressure of 95/65, respiratory rate is 12, heart rate of 105-112.   GENERAL: The patient is in no acute distress, but she does have intermittent body tremors.   NEUROLOGIC: She is awake and alert x3 with clear speech and language function. Mental status is intact. Motor examination shows symmetrical bulk and tone with no evidence of focal weakness. Reflexes are symmetrical. Toes are downgoing. Sensory examination is intact to pinprick and vibration. Coordination is intact to finger-nose-finger.       IMPRESSION: This lady presents with episodes of body tremors which appeared to be intentional. I do not see any evidence of seizures. This is a recurrent problem that had been happening before. The presentation clinically highly suggests anxiety, panic attack or nonepileptic spells. Neurologically, I will do CPK and aldolase which was mentioned by Dr. Ramsey Hines who patient sees in outpatient Neurology. We most likely will see elevation of CPK, as patient having active body movements.       I do recommend that the patient will receive Ativan, per dosing by the Hospitalist. She will have increase in blood pressure with IV hydration, and I do recommend Psychiatry consult.       After review of the chart and clinical history, this is not presentation due to neurological condition, and I would recommend mental health providers to be involved here. I discussed all the above with the patient and her . I will plan to follow on the lab results.           CARINA HILTON MD               D: 2017 14:36 T: 2017 15:29 MT: HARDEEP       Name: GARDENIA WISEMAN   MRN: [id number] Account: [id number]   : 1975 Consult Date: 2017       Document: [id number]            MRI BRAIN WITHOUT AND WITH CONTRAST 5/12/2015 5:10 PM     HISTORY: Myoclonic jerking, seizure.     TECHNIQUE: Multiplanar, multisequence MRI of the brain without and  with 5 mL Gadavist.     COMPARISON: None.     FINDINGS: The brain parenchyma, ventricles and subarachnoid spaces  appear normal. There is no evidence of hemorrhage, mass, acute  infarct, or anomaly. There are no gadolinium enhancing lesions.   Incidental note is made of a high-signal intensity T1 and T2 lesion in  the left temporal bone which is probably an incidental cholesterol  granuloma. There is no evidence for any hippocampal or medial temporal  lobe lesion.     IMPRESSION  IMPRESSION: Normal MRI of the brain.     BARBARA GÓMEZ MD    EEG         ONE-HOUR VIDEO ELECTROENCEPHALOGRAM       ORDERING PHYSICIAN: Ketan Jasmine MD       EEG #:        This EEG was done on [facility] with video monitoring recording of the spell. EEG demonstrated some sharp wave activity bilaterally that is not rhythmic. The patient otherwise had a spell that started with tingling and demonstrated really no electrographic seizures during the spells consistent of moving of the head from side-to-side with significant movement of the shoulders from side-to-side, no further spells were seen on this EEG.       IMPRESSION: Spell without electrographic correlation, but some sharp wave activities seen on this EEG, so at this point it is inconclusive whether the patient has seizures or not, but this spell that was recorded was not epileptiform.           KETAN JASMINE MD, PHD, FA               D: 05/14/2015 12:52 T: 05/14/2015 14:15 MT: Elyssa Perez MD - 05/11/2015 2:04 PM CDT  Formatting of this note may be different from the original.  Progress Notes signed by Elyssa Antonio MD at 05/11/15 1521   Author: Elyssa Antonio MD Service: (none) Author Type: Physician   Filed: 05/11/15 1521 Note Time: 05/11/15 1454 Status: Signed    : Elyssa Antonio MD (Physician)       NAME: GARDENIA WISEMAN MR#: [id number]  CSN: [id number]  AUTHENTICATING CLINICIAN: Elyssa Antonio MD  CONFIRM #: [id number]  LOC: [id number]    CLINIC CONSULTATION    DATE OF CONSULTATION: 2015  : 1975    REQUESTING PHYSICIAN:  Brianna Amaya MD    Gardenia Castañeda is a pleasant 40-year-old female who comes in for evaluation of leg twitching. The patient states that she has a prior history of fibromyalgia. In the past, she had fairly significant pain. However, she has done much better in the last 10 years or so. She states that recently she decided to try working out for the first time in a long time. She developed some issues that were felt to relate to problems with her IT band and knee. She was seen by Physical Therapy, and they were assessing her weakness. Apparently, in probing her knee, involuntary jumping of her quadriceps was noted. It was not expected, and she was advised to see a physician. The patient was seen at the [facility], where the findings were reproducible but the etiology was not clear. The patient has not had weakness of her legs. She has not had loss of muscle bulk. She does not have tingling or numbness. She does not have pain in her back or radiating down her leg, but she does have some knee issues and ongoing knee pain. The patient states that there are certain maneuvers such as doing a bridge pose that are likely to induce the problems. She does think that she is having a little bit of this twitching on the left side as well. The patient does not have a prior history of muscle twitching or fasciculations. She has not had any laboratory evaluation per se but had recent routine assessment at [facility] and thinks that her thyroid was done at that time. The patient was seen by Dr. Lama from Orthopedics in April, who notes that she had a right knee MRI with moderate edema in the Hoffa's fat pad. A V sling and  possible steroid injection were discussed. Neurologic evaluation was suggested for the fasciculation.    MEDICAL HISTORY:  Significant for fibromyalgia. The patient has a history of a number of episodes of serotonin syndrome and apparently is felt to be very sensitive to medications. The last episode was 4 years ago. She has been in the ICU because of serotonin syndrome. She has a history of an ovarian cyst. She currently takes Wellbutrin and p.r.n. Norco.    ALLERGIES:  Codeine. She has a notation in her chart that she cannot take medications that increase the serotonin level. When I ask about specific medications, she recalls Paxil but tells me that there are others that have caused her trouble.    SOCIAL HISTORY:  The patient has 2 children. She does not use tobacco. She occasionally uses alcohol. She has a caffeinated beverage per day. She is .    FAMILY HISTORY:  Negative for neuromuscular disorders.    REVIEW OF SYSTEMS:  No back pain. No numbness or tingling. No weakness. No bowel or bladder issues. She has knee pain, muscle twitching as described. No issues with her speech or swallowing. No vision change. No chest pain or palpitations. No shortness of breath.    EXAMINATION:  She is alert, very pleasant female who provides her own history.   Her blood pressure is 111/68, pulse 84, respirations 12. She is 113 pounds.   NECK: Supple. Carotids without bruits.   HEART: Regular in rate and rhythm.   EXTREMITIES: Without edema.   LUNGS: Clear.   NEUROLOGIC EXAMINATION: The patient is alert. Her speech is fluent. She is oriented to her history. She follows commands without difficulty. Cranial nerves, extraocular movements are intact. Visual fields are full. Pupils are equal and reactive. Funduscopic examination is unremarkable. Face symmetric in expression, sensation. Tongue is midline. No tongue fasciculations. Palate elevates symmetrically. Sternocleidomastoid and trapezius muscles are strong. On motor  examination, there is 5/5 muscle strength with normal bulk, normal tone. No atrophy and no spontaneous fasciculations are appreciated. Sensation intact to light touch, temperature, vibration, and position as well as pinprick. Reflexes are symmetric, and toes are downgoing. She demonstrates normal finger-nose-finger, fine finger movements, and heel-knee-shin. Patient is able to stand and ambulate without assistance. She can walk on her heels and toes. The patient can stand up and sit down several times. This does not induce any issue. At the conclusion of this examination, the patient indicates that my examination would not consist of the maneuvers that induce the symptoms. At this point, the patient does demonstrate by lying supine and pressing into the medial aspect of her lower knee. This induces a repetitive contraction of her quadriceps. The patient indicates that often it is the thigh adductor. The twitching continues until the patient flexes her knee and extends it again, and appears to be involuntary.    IMPRESSION:  Patient presents with involuntary repetitive contraction of the muscles in her right thigh, apparently induced by maneuvers of her right knee. I really do not have a neurologic explanation to offer. In the absence of what appears to be a mechanical maneuver with the right knee, I would be concerned for a structural and potentially neurogenic process in the lumbar spine or pelvis. I would be looking for a peripheral more than a central process. The patient has already had an EMG, which was nondiagnostic. For completeness, imaging of the lumbar spine and pelvis could be performed to ensure that there is not a structural abnormality that might be causing these symptoms. Certainly re-evaluation would be warranted if the patient were to develop weakness, atrophy, or more diffuse symptoms. The patient is understanding of the fact that a good explanation for her symptoms is not provided to her today. I  will see if any of my colleagues have encountered this issue. I would say that it is reassuring that her neurologic examination is essentially normal. For now, followup will be as needed. I will plan on contacting Dae with her MRI results.    ZM:MARIPOSA C:   D:05/11/15 14:04 T: 05/11/15 14:53 CONFIRM #: [id number]       Telephone Encounter - Elyssa Antonio MD - 05/12/2015 1:20 PM CDT  Formatting of this note may be different from the original.  Telephone Encounter by Elyssa Antonio MD at 05/12/15 1311   Author: Elyssa Antonio MD Service: (none) Author Type: Physician   Filed: 05/12/15 1320 Note Time: 05/12/15 1318 Status: Signed   : Elyssa Antonio MD (Physician)       this pt was seen yesterday for focal twitching of a r thigh muscle    today at outside er; ed doc called to say pt has leg twitching after working out and also arm and head thrashing, no loc  she is awake and talking, and likely will be admit to [facility]  informed that this was not akin to her problem yesterday  has ho serotonin syndrome and they are wondering about that    likely admit to [facility] and i suggest mri brain and eeg; consider functional etiology

## 2017-03-13 NOTE — MR AVS SNAPSHOT
"              After Visit Summary   3/13/2017    Dae Castañeda    MRN: [id number]           Patient Information     Date Of Birth          1975        Visit Information        Provider Department      3/13/2017 1:30 PM Franklin Atwood MD [facility]        Today's Diagnoses     Abnormal involuntary movement    -  1      Care Instructions    41 yr old Wolof-[place]ish woman with no family history of like condition.   She has had a variety of symptoms that included light headedness, nausea and involuntary movements.  There is a history of ?\"tremor\" in her MGF.     Episode on prozac  Episode on trazodone  episode after recovering from surgery    On her video which i reviewed she had thrashing of her arms and head - rotating to the right and left which is consistent with a nonepileptic event    These degree of movements are not typically seen with serotonin syndrome, ie. There are usually discrete myoclonic movements rather than thrashing movements.     Will have patient see Dr. Sánchez for neuropsychological testing  May see [facility] to discuss whether or not worth pursuing genetic testing  See psychology for possible CBT or referral if appropriate for this.   Lives in [place].    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  Call member services at your health plan with any benefit or coverage questions.      Please bring the following with you to your appointment:    (1) Any X-Rays, CTs or MRIs which have been performed.  Contact the facility where they were done to arrange for  prior to your scheduled " appointment.   (2) List of current medications   (3) This referral request   (4) Any documents/labs given to you for this referral            PSYCHOLOGY REFERRAL       Your provider has referred you to: psychology  For possible functional movement disorder     Please be aware that coverage of these services is subject to the terms and limitations of your health insurance plan.  Call member services at your health plan with any benefit or coverage questions.      Please bring the following to your appointment:    >>   Any x-rays, CTs or MRIs which have been performed.  Contact the facility where they were done to arrange for  prior to your scheduled appointment.   >>   List of current medications   >>   This referral request   >>   Any documents/labs given to you for this referral                  Follow-up notes from your care team     Return if symptoms worsen or fail to improve.      Future tests that were ordered for you today     Open Future Orders        Priority Expected Expires Ordered    Antistreptolysin O Routine  3/13/2019 3/13/2017    Erythrocyte sedimentation rate auto Routine  3/13/2019 3/13/2017    CRP inflammation Routine  3/13/2019 3/13/2017    Antinuclear antibody screen by EIA Routine  3/13/2019 3/13/2017    Pyruvic acid Routine  3/13/2019 3/13/2017    Endomysial antibody IgA Routine  3/13/2019 3/13/2017    Tissue transglutaminase clark IgA and IgG Routine  3/13/2019 3/13/2017    Paraneoplastic antibody Routine  3/13/2019 3/13/2017    Ceruloplasmin Routine  3/13/2019 3/13/2017    Thyroid peroxidase antibody Routine  3/13/2019 3/13/2017    TSH Routine  3/13/2019 3/13/2017    Glutamic acid decarboxylase antibody Routine  3/13/2019 3/13/2017    Lactic acid whole blood Routine  3/13/2019 3/13/2017            Who to contact     If you have questions or need follow up information about today's clinic visit or your schedule please contact [facility] directly at [phone].  Normal  "or non-critical lab and imaging results will be communicated to you by JustFabhart, letter or phone within 4 business days after the clinic has received the results. If you do not hear from us within 7 days, please contact the clinic through [employer]add[employer] or phone. If you have a critical or abnormal lab result, we will notify you by phone as soon as possible.  Submit refill requests through [employer] or call your pharmacy and they will forward the refill request to us. Please allow 3 business days for your refill to be completed.          Additional Information About Your Visit        The[employer] Information     The[employer] gives you secure access to your electronic health record. If you see a primary care provider, you can also send messages to your care team and make appointments. If you have questions, please call your primary care clinic.  If you do not have a primary care provider, please call [phone] and they will assist you.      TheLadders is an electronic gateway that provides easy, online access to your medical records. With The[employer]adders, you can request a clinic appointment, read your test results, renew a prescription or communicate with your care team.     To access your existing account, please contact your [facility] or call [phone] for assistance.        Care EveryWhere ID     This is your Care EveryWhere ID. This could be used by other organizations to access your [facility] medical records  [id number]        Your Vitals Were     Pulse Height Pulse Oximetry BMI (Body Mass Index)          90 1.626 m (5' 4\") 99% 20.27 kg/m2         Blood Pressure from Last 3 Encounters:   03/13/17 102/63   02/08/17 110/64   01/26/17 100/70    Weight from Last 3 Encounters:   03/13/17 53.6 kg (118 lb 1.6 oz)   02/08/17 52.6 kg (116 lb)   01/26/17 51.3 kg (113 lb)              We Performed the Following     GENETICS REFERRAL     PSYCHOLOGY REFERRAL        Primary Care Provider Office Phone # Fax #    [place] " MD Luis Manuel [phone]       [facility] [place] [address]  [place] [address]        Thank you!     Thank you for choosing [facility]  for your care. Our goal is always to provide you with excellent care. Hearing back from our patients is one way we can continue to improve our services. Please take a few minutes to complete the written survey that you may receive in the mail after your visit with us. Thank you!             Your Updated Medication List - Protect others around you: Learn how to safely use, store and throw away your medicines at www.disposemymeds.org.          This list is accurate as of: 3/13/17  2:37 PM.  Always use your most recent med list.                   Brand Name Dispense Instructions for use    buPROPion 75 MG tablet    WELLBUTRIN    180 tablet    TAKE 1 TABLET (75 MG) BY MOUTH 2 TIMES DAILY       IBUPROFEN PO      Take 400-800 mg by mouth daily as needed for moderate pain or fever       melatonin 5 MG Caps

## 2017-03-14 LAB
ANA SER QL IA: 1.4
THYROPEROXIDASE AB SERPL-ACNC: <10 IU/ML
TTG IGA SER-ACNC: 1 U/ML
TTG IGG SER-ACNC: NORMAL U/ML

## 2017-03-15 LAB
ASO AB SERPL-ACNC: 30 IU/ML (ref 0–120)
ENDOMYSIUM AB SER QL: NORMAL
GAD65 AB SER IA-ACNC: NORMAL
PYRUVATE CSF-SCNC: 0.05 MMOL/L

## 2017-03-16 ENCOUNTER — TELEPHONE (OUTPATIENT)
Dept: NURSING | Facility: CLINIC | Age: 42
End: 2017-03-16

## 2017-03-16 LAB — CERULOPLASMIN SERPL-MCNC: 25 MG/DL (ref 23–53)

## 2017-03-16 NOTE — TELEPHONE ENCOUNTER
Angelic, Franklin Cross MD  [facility] Neuro Pt [facility]; Paul Guo RN; Him, [facility] Routing Provider; Pepe Issa MD                   This is slightly positive LISA - meaning you may have a slight predisposition to autoimmune conditions.       Call placed to patient to review results as noted above per Dr. Atwood.    Patient not available and VM left asking patient to call neurology nurse to review test results.  Will await call back to review.

## 2017-03-17 LAB — PNP ABY SERUM: NORMAL

## 2017-03-28 ENCOUNTER — MYC MEDICAL ADVICE (OUTPATIENT)
Dept: FAMILY MEDICINE | Facility: CLINIC | Age: 42
End: 2017-03-28

## 2017-03-28 DIAGNOSIS — M54.6 ACUTE MIDLINE THORACIC BACK PAIN: ICD-10-CM

## 2017-03-28 RX ORDER — TIZANIDINE 2 MG/1
2 TABLET ORAL 2 TIMES DAILY PRN
Qty: 30 TABLET | Refills: 0 | Status: SHIPPED | OUTPATIENT
Start: 2017-03-28 | End: 2019-02-22

## 2017-03-28 NOTE — TELEPHONE ENCOUNTER
Please see Pageflakes message below.     zanaflex      Last Written Prescription Date:  11/23/16  Last Fill Quantity: 30,   # refills: 0  Last Office Visit with [facility], [facility] or [facility] prescribing provider: 2/8/17  Future Office visit:       Routing refill request to provider for review/approval because:  Drug not on the [facility], [facility] or [facility] refill protocol or controlled substance    Yusra Del Rosario RN   [facility] - Triage

## 2017-04-06 ENCOUNTER — TELEPHONE (OUTPATIENT)
Dept: FAMILY MEDICINE | Facility: CLINIC | Age: 42
End: 2017-04-06

## 2017-04-06 DIAGNOSIS — R11.0 NAUSEA: Primary | ICD-10-CM

## 2017-04-06 RX ORDER — ONDANSETRON 4 MG/1
4 TABLET, FILM COATED ORAL EVERY 12 HOURS PRN
Qty: 30 TABLET | Refills: 0 | Status: SHIPPED | OUTPATIENT
Start: 2017-04-06 | End: 2019-02-22

## 2017-04-06 NOTE — TELEPHONE ENCOUNTER
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   She wants to know if an anti nausea medication would help with these symtoms. Certain movements will bring on the nausea and can be very debilitating.  She would like to try something for the nausea or see what Dr. Issa thinks about it.  She is scheduled with Neuro/psych for further testing.    please advise,    Loren Loera RN  [facility]  [phone]

## 2017-04-06 NOTE — TELEPHONE ENCOUNTER
Spoke with patient and informed of below. Patient/ parent verbalized understanding and agrees with plan.    Yusra Del Rosario RN   [facility] - Triage

## 2017-04-06 NOTE — TELEPHONE ENCOUNTER
I have ordered Zofran. Please have her try that as needed. If not improving, have her follow up.     Pepe Issa MD  [facility], [place]

## 2017-06-01 DIAGNOSIS — F32.0 MAJOR DEPRESSIVE DISORDER, SINGLE EPISODE, MILD (H): ICD-10-CM

## 2017-06-02 RX ORDER — BUPROPION HYDROCHLORIDE 75 MG/1
TABLET ORAL
Qty: 180 TABLET | Refills: 1 | Status: SHIPPED | OUTPATIENT
Start: 2017-06-02 | End: 2017-12-08

## 2017-08-05 ENCOUNTER — HEALTH MAINTENANCE LETTER (OUTPATIENT)
Age: 42
End: 2017-08-05

## 2017-12-08 DIAGNOSIS — F32.0 MAJOR DEPRESSIVE DISORDER, SINGLE EPISODE, MILD (H): ICD-10-CM

## 2017-12-08 NOTE — LETTER
December 19, 2017      Dae Castañeda  [address]  [place] [address]        Dear Dae,         Our records indicates that it is time for you to be seen for an office visit with Pepe Issa MD.    Please call our office at [phone] to schedule an appointment for Annual Exam/PAP .   If you are no longer a [facility] patient; please let us know that as well.    Please disregard this notice if you have already made an appointment.      Sincerely,    [facility] Staff

## 2017-12-08 NOTE — TELEPHONE ENCOUNTER
Requested Prescriptions   Pending Prescriptions Disp Refills     buPROPion (WELLBUTRIN) 75 MG tablet [Pharmacy Med Name: BUPROPION HCL 75 MG TABLET]  Last Written Prescription Date:  6/2/17  Last Fill Quantity: 180 tablet,  # refills: 1   Last Office Visit with [facility], [facility] or [facility] prescribing provider:  2/8/17   Future Office Visit:      180 tablet 1     Sig: TAKE 1 TABLET (75 MG) BY MOUTH 2 TIMES DAILY    SSRIs Protocol Failed    12/8/2017  9:47 AM       Failed - PHQ-9 score less than 5 in past 6 months    Please review PHQ-9 score.  PHQ-9 SCORE 6/27/2016 1/26/2017 3/29/2017   Total Score - - -   Total Score MyChart - - 2 (Minimal depression)   Total Score 0 4 -      WAYNE-7 SCORE 3/9/2016 6/27/2016 1/26/2017   Total Score - - -   Total Score 0 0 0          Failed - Medication is NOT Bupropion    If the medication is Bupropion (Wellbutrin), and the patient is taking for smoking cessation; OK to refill.         Failed - Recent (6 mo) or future visit with authorizing provider's specialty    Patient had office visit in the last 6 months or has a visit in the next 30 days with authorizing provider.  See chart review.          Passed - Patient is age 18 or older       Passed - No active pregnancy on record       Passed - No positive pregnancy test in last 12 months

## 2017-12-08 NOTE — TELEPHONE ENCOUNTER
PHQ-9 SCORE 6/27/2016 1/26/2017 3/29/2017   Total Score - - -   Total Score MyChart - - 2 (Minimal depression)   Total Score 0 4 -     My chart sent for PHQ-9.  Sade Canas RN - Triage  [facility]

## 2017-12-12 RX ORDER — BUPROPION HYDROCHLORIDE 75 MG/1
TABLET ORAL
Qty: 180 TABLET | Refills: 1 | Status: SHIPPED | OUTPATIENT
Start: 2017-12-12 | End: 2018-06-11

## 2017-12-12 ASSESSMENT — PATIENT HEALTH QUESTIONNAIRE - PHQ9: SUM OF ALL RESPONSES TO PHQ QUESTIONS 1-9: 1

## 2017-12-12 NOTE — TELEPHONE ENCOUNTER
PHQ-9 SCORE 1/26/2017 3/29/2017 12/12/2017   Total Score - - -   Total Score MyChart - 2 (Minimal depression) -   Total Score 4 - 1     PHQ obtained, routing to PCP to review and fill if appropriate as LOV was greater than 6 months ago.     Yusra Del Rosario RN   [facility] - Triage

## 2017-12-12 NOTE — TELEPHONE ENCOUNTER
Refill ordered. Patient is due for Pap smear. Please have her follow-up for an annual exam.    Pepe Issa MD  [facility], [place]

## 2017-12-13 NOTE — TELEPHONE ENCOUNTER
left voicemail message for patient to contact Main Clinic Number to schedule.  NTBS: Annual Exam/PAP  Michelle PERES

## 2018-01-04 ENCOUNTER — HOSPITAL ENCOUNTER (EMERGENCY)
Facility: CLINIC | Age: 43
Discharge: HOME | End: 2018-01-04
Attending: EMERGENCY MEDICINE | Admitting: EMERGENCY MEDICINE
Payer: COMMERCIAL

## 2018-01-04 ENCOUNTER — APPOINTMENT (OUTPATIENT)
Dept: GENERAL RADIOLOGY | Facility: CLINIC | Age: 43
End: 2018-01-04
Attending: EMERGENCY MEDICINE
Payer: COMMERCIAL

## 2018-01-04 VITALS
RESPIRATION RATE: 16 BRPM | SYSTOLIC BLOOD PRESSURE: 96 MMHG | HEART RATE: 81 BPM | HEIGHT: 64 IN | DIASTOLIC BLOOD PRESSURE: 52 MMHG | BODY MASS INDEX: 19.63 KG/M2 | OXYGEN SATURATION: 100 % | WEIGHT: 115 LBS | TEMPERATURE: 98.4 F

## 2018-01-04 DIAGNOSIS — R07.9 CHEST PAIN, UNSPECIFIED TYPE: ICD-10-CM

## 2018-01-04 DIAGNOSIS — R42 LIGHTHEADEDNESS: ICD-10-CM

## 2018-01-04 LAB
ALBUMIN UR-MCNC: NEGATIVE MG/DL
ANION GAP SERPL CALCULATED.3IONS-SCNC: 7 MMOL/L (ref 3–14)
APPEARANCE UR: CLEAR
BACTERIA #/AREA URNS HPF: ABNORMAL /HPF
BASOPHILS # BLD AUTO: 0 10E9/L (ref 0–0.2)
BASOPHILS NFR BLD AUTO: 0.3 %
BILIRUB UR QL STRIP: NEGATIVE
BUN SERPL-MCNC: 13 MG/DL (ref 7–30)
CALCIUM SERPL-MCNC: 8.1 MG/DL (ref 8.5–10.1)
CHLORIDE SERPL-SCNC: 109 MMOL/L (ref 94–109)
CO2 SERPL-SCNC: 25 MMOL/L (ref 20–32)
COLOR UR AUTO: ABNORMAL
CREAT SERPL-MCNC: 0.79 MG/DL (ref 0.52–1.04)
DIFFERENTIAL METHOD BLD: NORMAL
EOSINOPHIL # BLD AUTO: 0.1 10E9/L (ref 0–0.7)
EOSINOPHIL NFR BLD AUTO: 1.8 %
ERYTHROCYTE [DISTWIDTH] IN BLOOD BY AUTOMATED COUNT: 12.1 % (ref 10–15)
GFR SERPL CREATININE-BSD FRML MDRD: 80 ML/MIN/1.7M2
GLUCOSE SERPL-MCNC: 99 MG/DL (ref 70–99)
GLUCOSE UR STRIP-MCNC: NEGATIVE MG/DL
HCT VFR BLD AUTO: 39.5 % (ref 35–47)
HGB BLD-MCNC: 13.5 G/DL (ref 11.7–15.7)
HGB UR QL STRIP: ABNORMAL
IMM GRANULOCYTES # BLD: 0 10E9/L (ref 0–0.4)
IMM GRANULOCYTES NFR BLD: 0.3 %
INTERPRETATION ECG - MUSE: NORMAL
KETONES UR STRIP-MCNC: NEGATIVE MG/DL
LEUKOCYTE ESTERASE UR QL STRIP: NEGATIVE
LYMPHOCYTES # BLD AUTO: 1.2 10E9/L (ref 0.8–5.3)
LYMPHOCYTES NFR BLD AUTO: 17.2 %
MCH RBC QN AUTO: 30.8 PG (ref 26.5–33)
MCHC RBC AUTO-ENTMCNC: 34.2 G/DL (ref 31.5–36.5)
MCV RBC AUTO: 90 FL (ref 78–100)
MONOCYTES # BLD AUTO: 0.8 10E9/L (ref 0–1.3)
MONOCYTES NFR BLD AUTO: 12.4 %
MUCOUS THREADS #/AREA URNS LPF: PRESENT /LPF
NEUTROPHILS # BLD AUTO: 4.6 10E9/L (ref 1.6–8.3)
NEUTROPHILS NFR BLD AUTO: 68 %
NITRATE UR QL: NEGATIVE
NRBC # BLD AUTO: 0 10*3/UL
NRBC BLD AUTO-RTO: 0 /100
PH UR STRIP: 7.5 PH (ref 5–7)
PLATELET # BLD AUTO: 218 10E9/L (ref 150–450)
POTASSIUM SERPL-SCNC: 3.7 MMOL/L (ref 3.4–5.3)
RBC # BLD AUTO: 4.38 10E12/L (ref 3.8–5.2)
RBC #/AREA URNS AUTO: 3 /HPF (ref 0–2)
SODIUM SERPL-SCNC: 141 MMOL/L (ref 133–144)
SOURCE: ABNORMAL
SP GR UR STRIP: 1 (ref 1–1.03)
SQUAMOUS #/AREA URNS AUTO: <1 /HPF (ref 0–1)
TROPONIN I SERPL-MCNC: <0.015 UG/L (ref 0–0.04)
TROPONIN I SERPL-MCNC: <0.015 UG/L (ref 0–0.04)
UROBILINOGEN UR STRIP-MCNC: NORMAL MG/DL (ref 0–2)
WBC # BLD AUTO: 6.7 10E9/L (ref 4–11)
WBC #/AREA URNS AUTO: 1 /HPF (ref 0–2)

## 2018-01-04 PROCEDURE — 25000132 ZZH RX MED GY IP 250 OP 250 PS 637: Performed by: EMERGENCY MEDICINE

## 2018-01-04 PROCEDURE — 93005 ELECTROCARDIOGRAM TRACING: CPT

## 2018-01-04 PROCEDURE — 99285 EMERGENCY DEPT VISIT HI MDM: CPT | Mod: 25

## 2018-01-04 PROCEDURE — 93005 ELECTROCARDIOGRAM TRACING: CPT | Mod: 76

## 2018-01-04 PROCEDURE — 71046 X-RAY EXAM CHEST 2 VIEWS: CPT

## 2018-01-04 PROCEDURE — 81001 URINALYSIS AUTO W/SCOPE: CPT | Performed by: EMERGENCY MEDICINE

## 2018-01-04 PROCEDURE — 84484 ASSAY OF TROPONIN QUANT: CPT | Performed by: EMERGENCY MEDICINE

## 2018-01-04 PROCEDURE — 85025 COMPLETE CBC W/AUTO DIFF WBC: CPT | Performed by: EMERGENCY MEDICINE

## 2018-01-04 PROCEDURE — 80048 BASIC METABOLIC PNL TOTAL CA: CPT | Performed by: EMERGENCY MEDICINE

## 2018-01-04 RX ORDER — ASPIRIN 81 MG/1
162 TABLET, CHEWABLE ORAL ONCE
Status: COMPLETED | OUTPATIENT
Start: 2018-01-04 | End: 2018-01-04

## 2018-01-04 RX ADMIN — ASPIRIN 81 MG 162 MG: 81 TABLET ORAL at 11:21

## 2018-01-04 ASSESSMENT — ENCOUNTER SYMPTOMS
NAUSEA: 0
VOMITING: 0
ABDOMINAL PAIN: 0
DIARRHEA: 0
DYSURIA: 0
LIGHT-HEADEDNESS: 1
NUMBNESS: 1

## 2018-01-04 NOTE — ED PROVIDER NOTES
History     Chief Complaint:  Lightheadedness    HPI   Soheilaomi HAKAN Castañeda is a 42 year old female who presents to the ED via EMS for evaluation of lightheadedness. The patient reports that she was getting ready for work this morning, and noticed that she had some pain in her left arm. 10-15 minutes later, she developed sharp chest pain that comes and goes. Deep breathing, movement, and eating does not worsen her pain. She then went to work around 0930, and she began to get lightheaded. Her lightheadedness progressively worsened and then her hand began to go numb, so an ambulance was called and she came to the ED.    Here in the ED, her pain is a 0/10 and her hand numbness has resolved. She notes that her episodes of lightheadedness are becoming less frequent, and her chest pain is now dull. She denies nausea, vomiting, diarrhea, abdominal pain, dysuria, vaginal discharge, vaginal bleeding, skin changes or rashes, or leg swelling.    Cardiac/PE/DVT Risk Factors:  The patient has no history of hypertension, hyperlipidemia, diabetes, or smoking.  The patient notes a family history of CAD and MI. The patient denies any personal or familial history of PE, DVT, or clotting disorder. The patient reports no recent travel, surgery, or other immobilizations.     Allergies:  No clinical screening  Codeine  Paroxetine  Venlafaxine    Medications:    Wellbutrin  Zofran  Zanaflex  Melatonin     Past Medical History:    Abnormal involuntary movement  Chromosome abnormality  Deviated nasal septum  Ovarian cyst  Female infertility  Depression  Myalgia and myositis  Fibromyalgia  Serotonin syndrome    Past Surgical History:    Aspiration and curettage  Nasal septum repair  Laparoscopy    Family History:    Hyperlipidemia  Thyroid disease  Breast cancer  Depression  HTN  CAD    Social History:  Marital Status:   [2]  Negative for tobacco use.  Occasional alcohol use    Review of Systems   Cardiovascular: Positive for  "chest pain. Negative for leg swelling.   Gastrointestinal: Negative for abdominal pain, diarrhea, nausea and vomiting.   Genitourinary: Negative for dysuria, vaginal bleeding and vaginal discharge.   Skin: Negative for rash.   Neurological: Positive for light-headedness and numbness.   All other systems reviewed and are negative.      Physical Exam   First Vitals:  Patient Vitals for the past 24 hrs:   BP Temp Temp src Pulse Heart Rate Resp SpO2 Height Weight   01/04/18 1336 96/52 - - - - - - - -   01/04/18 1330 100/58 - - - - - - - -   01/04/18 1300 97/60 - - - - - - - -   01/04/18 1230 106/64 - - - - - - - -   01/04/18 1130 - - - - 89 - - - -   01/04/18 1115 - - - - 92 - - - -   01/04/18 1100 - - - - 75 26 100 % - -   01/04/18 1056 118/73 98.4  F (36.9  C) Oral 81 - 16 100 % 1.626 m (5' 4\") 52.2 kg (115 lb)     Physical Exam  Vitals: reviewed by me  General: Pt seen on [facility], cooperative, and alert to conversation  Eyes: Tracking well, clear conj BL  ENT: MMM, O/P clear of lesions  Neck: Midline trachea, FROM to neck  Lungs:   No tachypnea, no accessory m use, speaking in full sentences.  CV: Regular rate with no JVD  Abd: Soft, non tender, no guarding, no rebound. Non distended  Extremities: no peripheral edema or joint effusion  Skin: No rash, normal turgor and temperature  Neuro: Clear speech and no facial droop.  Psych: Not RIS, no e/o AH/VH        Emergency Department Course   ECG:  Indication: Chest Pain  Time: 1101  Vent. Rate 71 bpm. ID interval 124. QRS duration 80. QT/QTc 440/478. P-R-T axis 62 79 63.  Normal sinus rhythm. Normal ECG. Read time: 1121.    Time: 1302  Vent. Rate 71 bpm. ID interval 128. QRS duration 78. QT/QTc 430/467. P-R-T axis 70 80 70.  Normal sinus rhythm. Normal ECG. Read time: 1304.    Imaging:  Radiographic findings were communicated with the patient who voiced understanding of the findings.  XR Chest 2 views:   No acute cardiopulmonary abnormality, as per " radiology.     Laboratory:  CBC: WBC: 6.7, HGB: 13.5, PLT: 218  BMP: Calcium 8.1 (L), o/w WNL (Creatinine: 0.79)    1139 Troponin: <0.015  1336 Troponin: <0.015    UA with Microscopic: blood small (A), pH 7.5 (H), bacteria few (A), mucous present (A), RBC 3 (H), o/w WNL    Interventions:  1121 Aspirin 162 mg PO    Emergency Department Course:  Nursing notes and vitals reviewed. 1115 I performed an exam of the patient as documented above.     IV inserted. Medicine administered as documented above. Blood drawn. This was sent to the lab for further testing, results above.    The patient provided a urine sample here in the emergency department. This was sent for laboratory testing, findings above.     The patient was sent for a Chest XR while in the emergency department, findings above.     Initial EKG & Repeat EKG obtained in the ED, see results above.     1351 I rechecked the patient and discussed the results of her workup thus far. Repeat troponin is negative, and the patient is feeling improved.    Findings and plan explained to the Patient and spouse. Patient discharged home with instructions regarding supportive care, medications, and reasons to return. The importance of close follow-up was reviewed.    I personally reviewed the laboratory results with the Patient and spouse and answered all related questions prior to discharge.    Impression & Plan      Medical Decision Making:  Dae Castañeda is a 42 year old female presents to the ED for evaluation of chest pain and lightheadedness.  There is no clear cause for the patient's chest pain today, though she does reassuringly have a negative troponin ×2, EKG with normal sinus rhythm and no ST changes, is PERC negative making PE very unlikely, and has no pain profile that would evidence aortic dissection.  She also has no evidence of pericarditis to suggest aortic dissection, has a very soft abdomen, and no evidence of infection here in the ED.  She does  state she feels slightly lightheaded when she gets up and walks to the bathroom, but we discussed oral hydration, and the patient is taking Tylenol here and does state she feels better than when she arrived.  With this in mind, I did elect to send the patient home under the instruction to follow-up with her PCP in the next several days as she had no clear cause of her chest pain and lightheadedness here today.  The patient has no murmur, no evidence of cardiogenic syncope or near syncope, and is otherwise very well-appearing.  She is okay for discharge home as above.    Diagnosis:    ICD-10-CM   1. Chest pain, unspecified type R07.9   2. Lightheadedness R42     Disposition:  discharged to home with     I, Rachel Sorensen, am serving as a scribe on 1/4/2018 at 11:02 AM to personally document services performed by Nazario Agosto* based on my observations and the provider's statements to me.     Rachel Sorensen  1/4/2018    EMERGENCY DEPARTMENT       Nazario Agosto MD  01/04/18 1640

## 2018-01-04 NOTE — ED AVS SNAPSHOT
Emergency Department    [address] [place] [address]    Phone:  [phone]    Fax:  [phone]                                       Dae Castañeda   MRN: [id number]    Department:   Emergency Department   Date of Visit:  1/4/2018           After Visit Summary Signature Page     I have received my discharge instructions, and my questions have been answered. I have discussed any challenges I see with this plan with the nurse or doctor.    ..........................................................................................................................................  Patient/Patient Representative Signature      ..........................................................................................................................................  Patient Representative Print Name and Relationship to Patient    ..................................................               ................................................  Date                                            Time    ..........................................................................................................................................  Reviewed by Signature/Title    ...................................................              ..............................................  Date                                                            Time

## 2018-01-04 NOTE — ED NOTES
Bed: ED10  Expected date:   Expected time:   Means of arrival:   Comments:  [facility] - [facility] - 42 F lightheaded dizzy eta 1042

## 2018-01-04 NOTE — ED AVS SNAPSHOT
Emergency Department    [address] [facility] [address]    Phone:  [phone]    Fax:  [phone]                                       Dae Castañeda   MRN: [id number]    Department:   Emergency Department   Date of Visit:  1/4/2018           Patient Information     Date Of Birth          1975        Your diagnoses for this visit were:     Chest pain, unspecified type     Lightheadedness        You were seen by Nazario Agosto MD.      Follow-up Information     Follow up with Pepe Issa MD. Schedule an appointment as soon as possible for a visit in 3 days.    Specialty:  Family Practice    Why:  For repeat evaluation and symptom check    Contact information:    [address]  [place] [id number] [phone]          Follow up with  Emergency Department.    Specialty:  EMERGENCY MEDICINE    Why:  If symptoms worsen    Contact information:    [address] [facility] [address] [phone]        Discharge Instructions          *CHEST PAIN, UNCERTAIN CAUSE    Based on your exam today, the exact cause of your chest pain is not certain. Your condition does not seem serious at this time, and your pain does not appear to be coming from your heart. However, sometimes the signs of a serious problem take more time to appear. Therefore, watch for the warning signs listed below.  HOME CARE:  1. Rest today and avoid strenuous activity.  2. Take any prescribed medicine as directed.  FOLLOW UP with your doctor in 1-3 days.   GET PROMPT MEDICAL ATTENTION if any of the following occur:    A change in the type of pain: if it feels different, becomes more severe, lasts longer, or begins to spread into your shoulder, arm, neck, jaw or back    Shortness of breath or increased pain with breathing    Weakness, dizziness, or fainting    Cough with blood or dark colored sputum (phlegm)    Fever over 101  F (38.3  C)    Swelling, pain or  redness in one leg    9733-7885 [employer]. [address] [place] [address], [place] [address]. All rights reserved. This information is not intended as a substitute for professional medical care. Always follow your healthcare professional's instructions.  This information has been modified by your health care provider with permission from the publisher.    DRINK PLENTY OF FLUIDS      Discharge References/Attachments     DIZZINESS OR FAINTING, POSSIBLE CAUSES OF (ENGLISH)      24 Hour Appointment Hotline       To make an appointment at any [facility], call [phone] ([phone]). If you don't have a family doctor or clinic, we will help you find one. [facility] clinics are conveniently located to serve the needs of you and your family.             Review of your medicines      Our records show that you are taking the medicines listed below. If these are incorrect, please call your family doctor or clinic.        Dose / Directions Last dose taken    buPROPion 75 MG tablet   Commonly known as:  WELLBUTRIN   Quantity:  180 tablet        TAKE 1 TABLET (75 MG) BY MOUTH 2 TIMES DAILY   Refills:  1        IBUPROFEN PO   Dose:  400-800 mg        Take 400-800 mg by mouth daily as needed for moderate pain or fever   Refills:  0        melatonin 5 MG Caps        Refills:  0        ondansetron 4 MG tablet   Commonly known as:  ZOFRAN   Dose:  4 mg   Quantity:  30 tablet        Take 1 tablet (4 mg) by mouth every 12 hours as needed for nausea   Refills:  0        tiZANidine 2 MG tablet   Commonly known as:  ZANAFLEX   Dose:  2 mg   Quantity:  30 tablet        Take 1 tablet (2 mg) by mouth 2 times daily as needed for muscle spasms   Refills:  0                Procedures and tests performed during your visit     Procedure/Test Number of Times Performed    Basic metabolic panel 1    CBC with platelets differential 1    EKG 12-lead, tracing only 2    Troponin I 2    UA with Microscopic 1    XR Chest 2 Views 1       Orders Needing Specimen Collection     None      Pending Results     No orders found from 1/2/2018 to 1/5/2018.            Pending Culture Results     No orders found from 1/2/2018 to 1/5/2018.            Pending Results Instructions     If you had any lab results that were not finalized at the time of your Discharge, you can call the ED Lab Result RN at [phone]. You will be contacted by this team for any positive Lab results or changes in treatment. The nurses are available 7 days a week from 10A to 6:30P.  You can leave a message 24 hours per day and they will return your call.        Test Results From Your Hospital Stay        1/4/2018 11:22 AM      Component Results     Component Value Ref Range & Units Status    WBC 6.7 4.0 - 11.0 10e9/L Final    RBC Count 4.38 3.8 - 5.2 10e12/L Final    Hemoglobin 13.5 11.7 - 15.7 g/dL Final    Hematocrit 39.5 35.0 - 47.0 % Final    MCV 90 78 - 100 fl Final    MCH 30.8 26.5 - 33.0 pg Final    MCHC 34.2 31.5 - 36.5 g/dL Final    RDW 12.1 10.0 - 15.0 % Final    Platelet Count 218 150 - 450 10e9/L Final    Diff Method Automated Method  Final    % Neutrophils 68.0 % Final    % Lymphocytes 17.2 % Final    % Monocytes 12.4 % Final    % Eosinophils 1.8 % Final    % Basophils 0.3 % Final    % Immature Granulocytes 0.3 % Final    Nucleated RBCs 0 0 /100 Final    Absolute Neutrophil 4.6 1.6 - 8.3 10e9/L Final    Absolute Lymphocytes 1.2 0.8 - 5.3 10e9/L Final    Absolute Monocytes 0.8 0.0 - 1.3 10e9/L Final    Absolute Eosinophils 0.1 0.0 - 0.7 10e9/L Final    Absolute Basophils 0.0 0.0 - 0.2 10e9/L Final    Abs Immature Granulocytes 0.0 0 - 0.4 10e9/L Final    Absolute Nucleated RBC 0.0  Final         1/4/2018 11:34 AM      Component Results     Component Value Ref Range & Units Status    Sodium 141 133 - 144 mmol/L Final    Potassium 3.7 3.4 - 5.3 mmol/L Final    Chloride 109 94 - 109 mmol/L Final    Carbon Dioxide 25 20 - 32 mmol/L Final    Anion Gap 7 3 - 14 mmol/L Final     Glucose 99 70 - 99 mg/dL Final    Urea Nitrogen 13 7 - 30 mg/dL Final    Creatinine 0.79 0.52 - 1.04 mg/dL Final    GFR Estimate 80 >60 mL/min/1.7m2 Final    Non  GFR Calc    GFR Estimate If Black >90 >60 mL/min/1.7m2 Final    African American GFR Calc    Calcium 8.1 (L) 8.5 - 10.1 mg/dL Final         1/4/2018 11:39 AM      Component Results     Component Value Ref Range & Units Status    Troponin I ES <0.015 0.000 - 0.045 ug/L Final    The 99th percentile for upper reference range is 0.045 ug/L.  Troponin values   in the range of 0.045 - 0.120 ug/L may be associated with risks of adverse   clinical events.           1/4/2018 12:08 PM      Narrative     XR CHEST 2 VW 1/4/2018 12:04 PM    HISTORY: Chest pain and dizziness.    COMPARISON: 3/9/2016    FINDINGS: No airspace consolidation, pleural effusion or pneumothorax.  Normal heart size.        Impression     IMPRESSION: No acute cardiopulmonary abnormality.    BARBARA DUNCAN MD         1/4/2018 11:29 AM      Component Results     Component Value Ref Range & Units Status    Color Urine Straw  Final    Appearance Urine Clear  Final    Glucose Urine Negative NEG^Negative mg/dL Final    Bilirubin Urine Negative NEG^Negative Final    Ketones Urine Negative NEG^Negative mg/dL Final    Specific Gravity Urine 1.003 1.003 - 1.035 Final    Blood Urine Small (A) NEG^Negative Final    pH Urine 7.5 (H) 5.0 - 7.0 pH Final    Protein Albumin Urine Negative NEG^Negative mg/dL Final    Urobilinogen mg/dL Normal 0.0 - 2.0 mg/dL Final    Nitrite Urine Negative NEG^Negative Final    Leukocyte Esterase Urine Negative NEG^Negative Final    Source Midstream Urine  Final    WBC Urine 1 0 - 2 /HPF Final    RBC Urine 3 (H) 0 - 2 /HPF Final    Bacteria Urine Few (A) NEG^Negative /HPF Final    Squamous Epithelial /HPF Urine <1 0 - 1 /HPF Final    Mucous Urine Present (A) NEG^Negative /LPF Final         1/4/2018  1:36 PM      Component Results     Component Value Ref Range &  Units Status    Troponin I ES <0.015 0.000 - 0.045 ug/L Final    The 99th percentile for upper reference range is 0.045 ug/L.  Troponin values   in the range of 0.045 - 0.120 ug/L may be associated with risks of adverse   clinical events.                  Clinical Quality Measure: Blood Pressure Screening     Your blood pressure was checked while you were in the emergency department today. The last reading we obtained was  BP: 96/52 . Please read the guidelines below about what these numbers mean and what you should do about them.  If your systolic blood pressure (the top number) is less than 120 and your diastolic blood pressure (the bottom number) is less than 80, then your blood pressure is normal. There is nothing more that you need to do about it.  If your systolic blood pressure (the top number) is 120-139 or your diastolic blood pressure (the bottom number) is 80-89, your blood pressure may be higher than it should be. You should have your blood pressure rechecked within a year by a primary care provider.  If your systolic blood pressure (the top number) is 140 or greater or your diastolic blood pressure (the bottom number) is 90 or greater, you may have high blood pressure. High blood pressure is treatable, but if left untreated over time it can put you at risk for heart attack, stroke, or kidney failure. You should have your blood pressure rechecked by a primary care provider within the next 4 weeks.  If your provider in the emergency department today gave you specific instructions to follow-up with your doctor or provider even sooner than that, you should follow that instruction and not wait for up to 4 weeks for your follow-up visit.        Thank you for choosing [facility]       Thank you for choosing [facility] for your care. Our goal is always to provide you with excellent care. Hearing back from our patients is one way we can continue to improve our services. Please take a few minutes to complete the  written survey that you may receive in the mail after you visit with us. Thank you!        [email or web address] Information     [employer] gives you secure access to your electronic health record. If you see a primary care provider, you can also send messages to your care team and make appointments. If you have questions, please call your primary care clinic.  If you do not have a primary care provider, please call [phone] and they will assist you.        Care EveryWhere ID     This is your Care EveryWhere ID. This could be used by other organizations to access your [place] medical records  [id number]        Equal Access to Services     [facility] : Wu Salgado, leann conde, praneeth vick, linda urbano . So [facility] [phone].    ATENCIÓN: Si habla español, tiene a tapia disposición servicios gratuitos de asistencia lingüística. Ald[place] al [phone].    We comply with applicable federal civil rights laws and [place] laws. We do not discriminate on the basis of race, color, national origin, age, disability, sex, sexual orientation, or gender identity.            After Visit Summary       This is your record. Keep this with you and show to your community pharmacist(s) and doctor(s) at your next visit.

## 2018-01-04 NOTE — DISCHARGE INSTRUCTIONS
*CHEST PAIN, UNCERTAIN CAUSE    Based on your exam today, the exact cause of your chest pain is not certain. Your condition does not seem serious at this time, and your pain does not appear to be coming from your heart. However, sometimes the signs of a serious problem take more time to appear. Therefore, watch for the warning signs listed below.  HOME CARE:  1. Rest today and avoid strenuous activity.  2. Take any prescribed medicine as directed.  FOLLOW UP with your doctor in 1-3 days.   GET PROMPT MEDICAL ATTENTION if any of the following occur:    A change in the type of pain: if it feels different, becomes more severe, lasts longer, or begins to spread into your shoulder, arm, neck, jaw or back    Shortness of breath or increased pain with breathing    Weakness, dizziness, or fainting    Cough with blood or dark colored sputum (phlegm)    Fever over 101  F (38.3  C)    Swelling, pain or redness in one leg    2212-6621 [employer]. [address] [place] [address]. All rights reserved. This information is not intended as a substitute for professional medical care. Always follow your healthcare professional's instructions.  This information has been modified by your health care provider with permission from the publisher.    DRINK PLENTY OF FLUIDS

## 2018-06-11 DIAGNOSIS — F32.0 MAJOR DEPRESSIVE DISORDER, SINGLE EPISODE, MILD (H): ICD-10-CM

## 2018-06-11 NOTE — LETTER
June 18, 2018      Dae Castañeda  [address]  [place] [address]        Dear Dae,     We have attempted to contact you regarding a prescription refill request we have on file for buPROPion (WELLBUTRIN) 75 MG tablet.  At this time we are not able to fill this medication until we see you in the office for a Medication Check.  Please call us at [phone] to schedule an appointment or with any questions or concerns.    *If you are No longer a [facility] patient; please let us know that as well.        Sincerely,    Michelle  for :  Dr. Lili Issa

## 2018-06-12 RX ORDER — BUPROPION HYDROCHLORIDE 75 MG/1
TABLET ORAL
Qty: 60 TABLET | Refills: 0 | Status: ON HOLD | OUTPATIENT
Start: 2018-06-12 | End: 2021-08-26

## 2018-06-12 NOTE — TELEPHONE ENCOUNTER
30 day supply given.  Patient is due for an OV.  Please call and assist with scheduling appointment prior to next refill   Sade Canas RN - Triage  [facility]

## 2018-06-12 NOTE — TELEPHONE ENCOUNTER
"Requested Prescriptions   Pending Prescriptions Disp Refills     buPROPion (WELLBUTRIN) 75 MG tablet  Last Written Prescription Date:  12/12/17  Last Fill Quantity: 180,  # refills: 1   Last office visit: 2/8/2017 with prescribing provider:  Ramesh   Future Office Visit:     180 tablet 1    SSRIs Protocol Failed    6/11/2018  2:34 PM       Failed - PHQ-9 score less than 5 in past 6 months    Please review last PHQ-9 score.   PHQ-9 SCORE 1/26/2017 3/29/2017 12/12/2017   Total Score - - -   Total Score MyChart - 2 (Minimal depression) -   Total Score 4 - 1              Failed - Recent (6 mo) or future (30 days) visit within the authorizing provider's specialty    Patient had office visit in the last 6 months or has a visit in the next 30 days with authorizing provider or within the authorizing provider's specialty.  See \"Patient Info\" tab in inbasket, or \"Choose Columns\" in Meds & Orders section of the refill encounter.           Passed - Medication is Bupropion    If the medication is Bupropion (Wellbutrin), and the patient is taking for smoking cessation; OK to refill.         Passed - Patient is age 18 or older       Passed - No active pregnancy on record       Passed - No positive pregnancy test in last 12 months          "

## 2018-06-14 NOTE — TELEPHONE ENCOUNTER
2nd message left for pt.    Pt is due for office visit with provider before further refills.    Sonali Torres CMA

## 2019-02-15 ENCOUNTER — TELEPHONE (OUTPATIENT)
Dept: OPHTHALMOLOGY | Facility: CLINIC | Age: 44
End: 2019-02-15

## 2019-02-15 NOTE — TELEPHONE ENCOUNTER
juli with pt        [facility] Call Center    Phone Message    May a detailed message be left on voicemail: yes    Reason for Call: Other: Pt referred by Dr Almonte from [facility]. Pt wants to see a neuro ophth but pt doesn't have a dx because the doctors there doesn't know what it is.     Pt states that she had a head injury 2 years ago but they don't know if that's related to pt's condition now. When pt first sustained the head injury, it took her several months to get over the side effects which is what she is experiencing now. Pt get nauseaous, more visual input. It makes pt sick. Pt can't look at a page card for more than 5 minutes.     The doctors first thought it was Vertigo but pt passed the tests. Please call Pt back to advise and assist with scheduling.     Action Taken: Message routed to:  [facility] ([facility]): [facility] ophth adult [facility]sc

## 2019-02-15 NOTE — TELEPHONE ENCOUNTER
Head injury 2 years ago with symptoms post injury lasting 4 months     In past week or so symptoms returning-- nausea at times-- visually related    Past vertigo tests    Seen By eye Dr. Howard and eye movement in dark/certain gazes having deviation between eyes    reviewed Dr. Jesus Munguia has extra training with patients with traumatic brain injuries and recommended f/u with dr. Munguia     Pt scheduled with Dr. Munguia next Friday in his traumatic brain injury time slot (TBI)    Pt aware of date/time/location at [facility]  Ben Preciado RN 10:31 AM 02/15/19

## 2019-02-22 ENCOUNTER — OFFICE VISIT (OUTPATIENT)
Dept: OPHTHALMOLOGY | Facility: CLINIC | Age: 44
End: 2019-02-22
Payer: COMMERCIAL

## 2019-02-22 DIAGNOSIS — H51.8 DIVERGENCE INSUFFICIENCY: ICD-10-CM

## 2019-02-22 DIAGNOSIS — F07.81 POSTCONCUSSION SYNDROME: Primary | ICD-10-CM

## 2019-02-22 ASSESSMENT — VISUAL ACUITY
OS_CC+: -1
OD_CC: 20/25
OD_CC+: -2
OS_CC: J1+
CORRECTION_TYPE: CONTACTS
OD_CC: J1+
OS_CC: 20/20
METHOD: SNELLEN - LINEAR

## 2019-02-22 ASSESSMENT — CONF VISUAL FIELD
OS_NORMAL: 1
METHOD: COUNTING FINGERS
OD_NORMAL: 1

## 2019-02-22 ASSESSMENT — CUP TO DISC RATIO
OD_RATIO: 0.3
OS_RATIO: 0.3

## 2019-02-22 ASSESSMENT — EXTERNAL EXAM - RIGHT EYE: OD_EXAM: NORMAL

## 2019-02-22 ASSESSMENT — SLIT LAMP EXAM - LIDS
COMMENTS: NORMAL
COMMENTS: NORMAL

## 2019-02-22 ASSESSMENT — TONOMETRY
OS_IOP_MMHG: 18
OD_IOP_MMHG: 20
IOP_METHOD: ICARE

## 2019-02-22 ASSESSMENT — EXTERNAL EXAM - LEFT EYE: OS_EXAM: NORMAL
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
## 2019-02-22 NOTE — LETTER
2/22/2019      RE: Dae Castañeda  [address]  [place] [address]       Assessment/Plan  (F07.81) Postconcussion syndrome  (primary encounter diagnosis)  Comment: Eye tracking, gaze stability and focus issues  Plan: Strongly suspect that patient's decompensated esophoria is playing a role with her symptoms. Suggested patient try divergence exercises such as Micheal string (changing focus from near targets to far targets). OTC reading glasses appeared to correct most of patient's near esophoria. +1.25 lenses recommended at this time for near/computer work. Patient should plan on returning to clinic in 1 month for progress evaluation but is welcome to RTC sooner if symptoms fail to improve. Base out prism may also be helpful, but since patient is a CL wearer will hold off for now. Will consider external referral for VT if symptoms fail to improve by next visit.     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 convergence excess   Plan: See above.       Complete documentation of historical and exam elements from today's encounter can  be found in the full encounter summary report (not reduplicated in this progress  note). I personally obtained the chief complaint(s) and history of present illness. I  confirmed and edited as necessary the review of systems, past medical/surgical  history, family history, social history, and examination findings as documented by  others; and I examined the patient myself. I personally reviewed the relevant tests,  images, and reports as documented above. I formulated and edited as necessary the  assessment and plan and discussed the findings and management plan with the  patient and family.    Jesus Munguia OD

## 2019-02-22 NOTE — PROGRESS NOTES
Assessment/Plan  (F07.81) Postconcussion syndrome  (primary encounter diagnosis)  Comment: Eye tracking, gaze stability and focus issues  Plan: Strongly suspect that patient's decompensated esophoria is playing a role with her symptoms. Suggested patient try divergence exercises such as Micheal string (changing focus from near targets to far targets). OTC reading glasses appeared to correct most of patient's near esophoria. +1.25 lenses recommended at this time for near/computer work. Patient should plan on returning to clinic in 1 month for progress evaluation but is welcome to RTC sooner if symptoms fail to improve. Base out prism may also be helpful, but since patient is a CL wearer will hold off for now. Will consider external referral for VT if symptoms fail to improve by next visit.     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 convergence excess   Plan: See above.       Complete documentation of historical and exam elements from today's encounter can  be found in the full encounter summary report (not reduplicated in this progress  note). I personally obtained the chief complaint(s) and history of present illness. I  confirmed and edited as necessary the review of systems, past medical/surgical  history, family history, social history, and examination findings as documented by  others; and I examined the patient myself. I personally reviewed the relevant tests,  images, and reports as documented above. I formulated and edited as necessary the  assessment and plan and discussed the findings and management plan with the  patient and family.    Jesus Munguia OD

## 2019-02-22 NOTE — PATIENT INSTRUCTIONS
Today I found that your symptoms are likely related to what is called divergence insufficiency or convergence excess. This means that your eyes have a tendency to cross inward and have difficulty diverging, or moving outward.     I recommend purchasing a pair of +1.00 or +1.25 over the counter reading glasses. Use these when at the computer or doing near work- this will help relax your eyes.     Also try doing the Micheal String exercises we discussed. You can make a string with different colored beads or purchase one on [employer]. Focus on how your eyes feel changing between a near target and a far target.     Please call the clinic with additional questions [phone]

## 2019-04-05 ENCOUNTER — TELEPHONE (OUTPATIENT)
Dept: OPHTHALMOLOGY | Facility: CLINIC | Age: 44
End: 2019-04-05

## 2019-05-15 ENCOUNTER — APPOINTMENT (OUTPATIENT)
Dept: GENERAL RADIOLOGY | Facility: CLINIC | Age: 44
End: 2019-05-15
Attending: EMERGENCY MEDICINE
Payer: COMMERCIAL

## 2019-05-15 ENCOUNTER — HOSPITAL ENCOUNTER (EMERGENCY)
Facility: CLINIC | Age: 44
Discharge: HOME | End: 2019-05-15
Attending: EMERGENCY MEDICINE | Admitting: EMERGENCY MEDICINE
Payer: COMMERCIAL

## 2019-05-15 VITALS
DIASTOLIC BLOOD PRESSURE: 62 MMHG | OXYGEN SATURATION: 100 % | TEMPERATURE: 98.4 F | SYSTOLIC BLOOD PRESSURE: 102 MMHG | HEART RATE: 73 BPM | WEIGHT: 115 LBS | HEIGHT: 64 IN | RESPIRATION RATE: 10 BRPM | BODY MASS INDEX: 19.63 KG/M2

## 2019-05-15 DIAGNOSIS — R07.9 CHEST PAIN, UNSPECIFIED TYPE: ICD-10-CM

## 2019-05-15 LAB
ANION GAP SERPL CALCULATED.3IONS-SCNC: 1 MMOL/L (ref 3–14)
BASOPHILS # BLD AUTO: 0 10E9/L (ref 0–0.2)
BASOPHILS NFR BLD AUTO: 0.4 %
BUN SERPL-MCNC: 11 MG/DL (ref 7–30)
CALCIUM SERPL-MCNC: 8.6 MG/DL (ref 8.5–10.1)
CHLORIDE SERPL-SCNC: 108 MMOL/L (ref 94–109)
CO2 SERPL-SCNC: 29 MMOL/L (ref 20–32)
CREAT SERPL-MCNC: 0.8 MG/DL (ref 0.52–1.04)
D DIMER PPP FEU-MCNC: <0.3 UG/ML FEU (ref 0–0.5)
DIFFERENTIAL METHOD BLD: NORMAL
EOSINOPHIL # BLD AUTO: 0.1 10E9/L (ref 0–0.7)
EOSINOPHIL NFR BLD AUTO: 1.5 %
ERYTHROCYTE [DISTWIDTH] IN BLOOD BY AUTOMATED COUNT: 12.2 % (ref 10–15)
GFR SERPL CREATININE-BSD FRML MDRD: >90 ML/MIN/{1.73_M2}
GLUCOSE SERPL-MCNC: 93 MG/DL (ref 70–99)
HCT VFR BLD AUTO: 41.2 % (ref 35–47)
HGB BLD-MCNC: 13.9 G/DL (ref 11.7–15.7)
IMM GRANULOCYTES # BLD: 0 10E9/L (ref 0–0.4)
IMM GRANULOCYTES NFR BLD: 0.4 %
LYMPHOCYTES # BLD AUTO: 1.2 10E9/L (ref 0.8–5.3)
LYMPHOCYTES NFR BLD AUTO: 26.4 %
MCH RBC QN AUTO: 30.8 PG (ref 26.5–33)
MCHC RBC AUTO-ENTMCNC: 33.7 G/DL (ref 31.5–36.5)
MCV RBC AUTO: 91 FL (ref 78–100)
MONOCYTES # BLD AUTO: 0.6 10E9/L (ref 0–1.3)
MONOCYTES NFR BLD AUTO: 13.6 %
NEUTROPHILS # BLD AUTO: 2.7 10E9/L (ref 1.6–8.3)
NEUTROPHILS NFR BLD AUTO: 57.7 %
NRBC # BLD AUTO: 0 10*3/UL
NRBC BLD AUTO-RTO: 0 /100
PLATELET # BLD AUTO: 220 10E9/L (ref 150–450)
POTASSIUM SERPL-SCNC: 3.8 MMOL/L (ref 3.4–5.3)
RBC # BLD AUTO: 4.52 10E12/L (ref 3.8–5.2)
SODIUM SERPL-SCNC: 138 MMOL/L (ref 133–144)
TROPONIN I SERPL-MCNC: <0.015 UG/L (ref 0–0.04)
TROPONIN I SERPL-MCNC: <0.015 UG/L (ref 0–0.04)
WBC # BLD AUTO: 4.7 10E9/L (ref 4–11)

## 2019-05-15 PROCEDURE — 99285 EMERGENCY DEPT VISIT HI MDM: CPT | Mod: 25

## 2019-05-15 PROCEDURE — 85025 COMPLETE CBC W/AUTO DIFF WBC: CPT | Performed by: EMERGENCY MEDICINE

## 2019-05-15 PROCEDURE — 93005 ELECTROCARDIOGRAM TRACING: CPT

## 2019-05-15 PROCEDURE — 71046 X-RAY EXAM CHEST 2 VIEWS: CPT

## 2019-05-15 PROCEDURE — 80048 BASIC METABOLIC PNL TOTAL CA: CPT | Performed by: EMERGENCY MEDICINE

## 2019-05-15 PROCEDURE — 84484 ASSAY OF TROPONIN QUANT: CPT | Mod: 91 | Performed by: EMERGENCY MEDICINE

## 2019-05-15 PROCEDURE — 85379 FIBRIN DEGRADATION QUANT: CPT | Performed by: EMERGENCY MEDICINE

## 2019-05-15 ASSESSMENT — ENCOUNTER SYMPTOMS
COUGH: 0
MYALGIAS: 0
CHEST TIGHTNESS: 1
FEVER: 0
DIAPHORESIS: 1

## 2019-05-15 ASSESSMENT — MIFFLIN-ST. JEOR: SCORE: 1161.64

## 2019-05-15 NOTE — ED AVS SNAPSHOT
Emergency Department  [address] [place] [address]  Phone:  [phone]  Fax:  [phone]                                    Dae Castañeda   MRN: [id number]    Department:   Emergency Department   Date of Visit:  5/15/2019           After Visit Summary Signature Page    I have received my discharge instructions, and my questions have been answered. I have discussed any challenges I see with this plan with the nurse or doctor.    ..........................................................................................................................................  Patient/Patient Representative Signature      ..........................................................................................................................................  Patient Representative Print Name and Relationship to Patient    ..................................................               ................................................  Date                                   Time    ..........................................................................................................................................  Reviewed by Signature/Title    ...................................................              ..............................................  Date                                               Time          22EPIC Rev 08/18

## 2019-05-15 NOTE — ED PROVIDER NOTES
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 Patient states that yesterday around lunch she had two, \"sharp, stabbing pains\" over her left chest. Then, last night, while making dinner she began to have this \"stabbing pain\" again. The pain is \"intense\" and worse with breathing. The pain last about 20 minutes last night and then continued to episodically linger throughout the night, lasting for 1-2 minutes later in the night. By this morning, she began to have longer episodes of the same kind and quality of pain. Her first episode today lasted 10 minutes and would not entirely dissipate. She continued to have milder \"pressure and stabbing,\" worse with deep breathing, and so called her primary care provider. She was asked her to come to the emergency department out of concerns for blood clots. Denies recent cough, fever, calf pain, nausea, and calf swelling. Notes that she has more diaphoresis at night, but states these are hot flashes.     Cardiac/PE/DVT Risk Factors:  History of hypertension - negative   History of hyperlipidemia - negative   History of diabetes - negative   History of smoking - negative   Personal history of PE/DVT - negative   Family history of PE/DVT - negative   Family history of heart complications - positive - grandfather >20 heart attacks, per patient. 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   Deviated nasal septum   Female infertility associated with " "anovulation   History of electroencephalogram   History of magnetic resonance imaging of brain and brain stem  Major depressive disorder single episode, mild   Myalgia and myositis   Ruptured ovarian cyst  Serotonin syndrome   Supervision of other normal pregnancy   Fibromyalgia     Past Surgical History:    Aspiration and curettage   Repair of nasal septum   Laparoscopy     Family History:    Mother - lipids, thyroid disease, breast cancer  Father - depression, hypertension, coronary artery disease (myocardial infarction)    Social History:  The patient was alone.  Smoking Status: Never  Smokeless Tobacco: Never  Alcohol Use: Yes   Marital Status:        Review of Systems   Constitutional: Positive for diaphoresis (baseline hot flashes). Negative for fever.   Respiratory: Positive for chest tightness. Negative for cough.    Cardiovascular: Positive for chest pain. Negative for leg swelling.   Musculoskeletal: Negative for myalgias.   All other systems reviewed and are negative.      Physical Exam     Patient Vitals for the past 24 hrs:   BP Temp Temp src Pulse Heart Rate Resp SpO2 Height Weight   05/15/19 1340 102/62 -- -- 73 73 10 -- -- --   05/15/19 1240 98/64 -- -- 75 77 14 100 % -- --   05/15/19 1140 98/63 -- -- 76 81 15 100 % -- --   05/15/19 1100 108/70 -- -- 80 84 12 99 % -- --   05/15/19 1036 111/82 98.4  F (36.9  C) Oral 84 84 16 100 % 1.626 m (5' 4\") 52.2 kg (115 lb)      Physical Exam  General/Appearance: appears stated age, well-groomed, appears comfortable  Eyes: EOMI, no scleral injection, no icterus  ENT: MMM  Neck: supple, nl ROM, no stiffness  Cardiovascular: RRR, nl S1S2, no m/r/g, 2+ pulses in all 4 extremities, cap refill <2sec, pain not reproducible with palpation of chest wall  Respiratory: CTAB, good air movement throughout, no wheezes/rhonchi/rales, no increased WOB, no retractions  Back: no lesions  GI: abd soft, non-distended, nttp,  no HSM, no rebound, no guarding, nl BS  MSK: BLANCA, " good tone, no bony abnormality  Skin: warm and well-perfused, no rash, no edema, no ecchymosis, nl turgor  Neuro: GCS 15, alert and oriented, no gross focal neuro deficits  Psych: interacts appropriately  Heme: no petechia, no purpura, no active bleeding      Emergency Department Course     ECG:  Indication: Chest pain   Completed at 1030.  Read at 1135.   Suspect arm lead reversal, interpretation assumes no reversal  Unusual P axis, possible ectopic atrial rhythm   Right axis deviation  Nonspecific T wave abnormality   Prolonged QT   Abnormal ECG   Rate 92 bpm. KY interval 124. QRS duration 72. QT/QTc 372/460. P-R-T axes 144 126 117.    Imaging:  Radiology findings were communicated with the patient who voiced understanding of the findings.    XR Chest 2 views  IMPRESSION: No evidence of active cardiopulmonary disease.  Report per radiology     Laboratory:  Laboratory findings were communicated with the patient who voiced understanding of the findings.    CBC: AWNL. (WBC 4.7, HGB 13.9, )   BMP: Anion Gap 1 (L) o/w WNL (Creatinine 0.80)    Troponin (Collected 1054): <0.015  Troponin (Collected 1328): <0.015    D Dimer (Collected 1100): <0.3    Emergency Department Course:  Nursing notes and vitals reviewed.  IV was inserted and blood was drawn for laboratory testing, results above.  The patient was sent for a CXR while in the emergency department, results above.   EKG obtained in the ED, see results above.     1044: I performed an exam of the patient as documented above.     1314: Patient rechecked and updated.      1356: Patient rechecked and updated.      Findings and plan explained to the Patient. Patient discharged home with instructions regarding supportive care, medications, and reasons to return. The importance of close follow-up was reviewed.     I personally reviewed the laboratory and imaging results with the Patient and answered all related questions prior to discharge.    Impression & Plan       Medical Decision Making:  This pt presents with chest pain of unclear etiology.  At this time I do not suspect that there is an acute/dangerous pathology for the chest pain.  They have no significant personal/family history of cardiac ds.  They have no significant cardiac risk factors.  Their EKG and CXR were unremarkable.  Their delta trop was neg.  I have also considered PE but they are PERC neg and have a neg DDimer. There are no focal infiltrates, effusions, pulm edema, or evidence of PTX on CXR. The pt has not had recent fevers or viral infections to suggest pericarditis.  They are at low risk for aortic pathology and have nl aortic contour on CXR.  They have not had recent chest trauma.  All of this was discussed with the pt and they were reassurred.  I have advised them to follow-up with their PCP in the next 3 days to get further evaluation, and to return to the ED sooner if their CP continues/worsens, they develop severe SOB/fevers/lightheadedness, or they develop any other new and concerning sxs. At this point the pt is HD stable and appropriate for discharge.    Diagnosis:    ICD-10-CM   1. Chest pain, unspecified type R07.9       Disposition:  Discharged to home.    Scribe Disclosure:  I, Rosemarie Moya, am serving as a scribe at 10:38 AM on 5/15/2019 to document services personally performed by Erin Sanders MD based on my observations and the provider's statements to me.   5/15/2019    EMERGENCY DEPARTMENT       Erin Sanders MD  05/15/19 [id number]

## 2019-09-19 ENCOUNTER — TRANSFERRED RECORDS (OUTPATIENT)
Dept: HEALTH INFORMATION MANAGEMENT | Facility: CLINIC | Age: 44
End: 2019-09-19

## 2019-09-30 ENCOUNTER — HEALTH MAINTENANCE LETTER (OUTPATIENT)
Age: 44
End: 2019-09-30

## 2019-10-17 ENCOUNTER — TRANSFERRED RECORDS (OUTPATIENT)
Dept: HEALTH INFORMATION MANAGEMENT | Facility: CLINIC | Age: 44
End: 2019-10-17

## 2020-01-22 ENCOUNTER — TELEPHONE (OUTPATIENT)
Dept: MAMMOGRAPHY | Facility: CLINIC | Age: 45
End: 2020-01-22

## 2020-01-22 NOTE — TELEPHONE ENCOUNTER
Patient calling this morning to ask about getting scheduled to see a surgeon.    Patient states she has noticed dimpling of her right breast for the past month and a few weeks ago went to [facility] to have diagnostic mammogram, ultrasound and breast MRI.  Patient states the recommendations from the MRI are to repeat a breast ultrasound in 6 months.    Patient expressed concern about this plan and would like to have a surgeon review her case.  Informed patient I could assist her in getting scheduled for a surgical consultation.  Patient states she will need to call back because she is on her was to work at the moment.  I gave patient information to [facility] to call and schedule a consultation.  Also informed patient to call me back with any questions or concerns.  Patient verbalized understanding & agrees with the plan of care.  Yeimi Hope, RN, BSN

## 2020-02-06 NOTE — PROGRESS NOTES
[facility] Breast Surgery Consultation    HPI:   Dae Castañeda is a 44 year old female w PMH s/f serotonin syndrome who is seen in consultation at the request of Dr. Issa for evaluation of right breast skin dimpling that she has had since last summer. She has had a diagnostic mammogram, US and breast MRI at [facility] in September 2019. Mammogram revealed heterogenous tissue, benign calcifications, no masses. The US revealed a few small benign cysts at 9-10:00 at the site of the area of concern and a 1.2cm subtle hypoechoic foci at 12:00, 3cm FN. The MRI revealed a few small scattered breast cysts. No abnormality to correspond with skin dimpling or with the 13mm x 9mm hypoechoic nodule seen on  US at 12:00, 3cm FN. and recommended follow up ultrasound in 6 months.     She reports she is very concerned about the possibility of breast cancer. Her mother had occult breast cancer which required surgical excision for diagnosis. She reports no nipple discharge or drainage. No prior biopsies. She has bilateral breast pain and has had this for 2 years. She also noticed an area on the left lateral breast with an indentation/change to the contour.     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 .     Family history of breast cancer: Yes - mother and maternal grandmother  Family history of ovarian cancer:  No  Family history of colon cancer: No  Family history of prostate cancer: No    Imaging:   Mammogram revealed heterogenous tissue, benign calcifications, no masses. The US revealed a few small benign cysts at 9-10:00 at the site of the area of concern and a 1.2cm subtle hypoechoic foci at 12:00, 3cm FN. The MRI revealed a few small scattered breast cysts. No abnormality to correspond with skin dimpling or with the 13mm x 9mm hypoechoic nodule seen on   US at 12:00, 3cm FN. and recommended follow up ultrasound in 6 months.    Past Medical History:   has a past medical history of Abnormal involuntary movement (1/31/2017), Chromosome abnormality (2008), Deviated nasal septum (2000), Female infertility associated with anovulation, H/O electroencephalogram (1/31/2017), H/O magnetic resonance imaging of brain and brain stem (3/6/2017), Major depressive disorder, single episode, mild (H) (1995), Myalgia and myositis, unspecified (1995), Ruptured ovarian cyst (1/2011), Serotonin syndrome, and Supervision of other normal pregnancy.      Current Outpatient Medications:      buPROPion (WELLBUTRIN) 75 MG tablet, TAKE 1 TABLET (75 MG) BY MOUTH 2 TIMES DAILY, Disp: 60 tablet, Rfl: 0     IBUPROFEN PO, Take 400-800 mg by mouth daily as needed for moderate pain or fever , Disp: , Rfl:      melatonin 5 MG CAPS, , Disp: , Rfl:     Past Surgical History:  Past Surgical History:   Procedure Laterality Date     ASPIRATION AND CURETTAGE  3/2008    Missed AB following in vitro fertilzation/implantation     HC REPAIR OF NASAL SEPTUM  4/2000     LAPAROSCOPY  1/2011    for drainage intra-abdominal hematoma s/p ruptured right ovarian cyst           Allergies   Allergen Reactions     No Clinical Screening - See Comments      Other reaction(s): Other, see comments  PN: Don't give medications that increase serotonin level, will end up in ICU.      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     Occupation: homemaker   Social Needs     Financial resource strain: Not on file     Food insecurity:     Worry: Not on file     Inability: Not on file     Transportation needs:     Medical: Not on file     Non-medical: Not on file   Tobacco Use     Smoking status: Never Smoker     Smokeless tobacco: Never Used   Substance and Sexual Activity     Alcohol use: Yes     Comment: 1 per month     Drug use: No     Sexual activity: Yes     Partners: Male     Comment: same since partner since 1994   Lifestyle     Physical activity:     Days per week: Not on file     Minutes per session: Not on file     Stress: Not on file   Relationships     Social connections:     Talks on phone: Not on file     Gets together: Not on file     Attends Worship service: Not on file     Active member of club or organization: Not on file     Attends meetings of clubs or organizations: Not on file     Relationship status: Not on file     Intimate partner violence:     Fear of current or ex partner: Not on file     Emotionally abused: Not on file     Physically abused: Not on file     Forced sexual activity: Not on file   Other Topics Concern      Service No     Blood Transfusions No     Caffeine Concern No     Occupational Exposure No     Hobby Hazards No     Sleep Concern No     Comment: with zolpidem     Stress Concern No     Weight Concern No     Special Diet No     Back Care No     Exercise Yes     Comment: stretching/aerobic 20 min 3 days per week     Bike Helmet Yes     Seat Belt Yes     Self-Exams Yes   Social History Narrative    Lives with  and 9 year old and 2 cats.  Thinking about another pregnancy - In Vitro 1/2008.    Adopted a 5 years old from [place] in spring 2013.          SOCIAL HISTORY: Nondrinker, nonsmoker. She is a school paraprofessional.           FAMILY HISTORY: Noncontributory to this presentation.           ALLERGIES: Codeine, venlafaxine and Paxil.          FAMILY PSYCHIATRIC HISTORY: Depression in patient's father.           SOCIAL HISTORY: Patient reports she has 2 siblings. She reports she grew up in [place]. She denies any history of abuse. She reports graduating high school and completing some college. She is  and has 2 children, ages 7 and 12. 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 She denies legal issues or criminal history.         lives in [place]. spouse is Manuel        Father is Thai     Mother is 1/2 Hebrew 1/2 polish (mgm rylie, mgf lexie)    2 sisters without neurological    Maternal grandfather had tremor - shot for this.         Depression in her father    Sister with depression/anxiety    Mother has had some depression        ROS:  The 10 point review of systems is negative other than noted in the HPI and above.    PE:  Vitals: There were no vitals taken for this visit.  General appearance: well-nourished, sitting comfortably, no apparent distress  Psych: normal affect, pleasant  HEENT:  Head normocephalic and atraumatic, pupils equal and round, conjunctivae clear, mucous membranes moist, external ears and nose normal  Neck: Supple without thyromegaly or masses  Lungs: Respirations unlabored  Lymphatic: No cervical, or supraclavicular lymphadenopathy  Extremities: Without edema  Musculoskeletal:  Normal station and gait  Neurologic: nonfocal, grossly intact times four extremities, alert and oriented times three  Psychiatric: Mood and affect are appropriate  Skin: Without lesions or rashes    Breast:  A bilateral breast exam was performed in the supine position.. Bilateral breasts were palpated in a circumferential clockwise fashion including the supraclavicular and axillary areas.   On the right lateral breast at 3:00 in sitting position is dimpling of the skin. There is no palpable underlying mass. There is increased density retroareolar and no definite mass at 12:00 on the right. Left breast is normal. No  masses.     Lymph:       No supraclavicular/infraclavicular adenopathy.   Axillary adenopathy: none    Assessment/Plan: Soheilaporfirio Castañeda is a 44 year old who presents with a 1.3cm lesion noted on US in September with irregular edges and hypoechoic in nature - I would recommend repeat US and biopsy of this lesion as well as US of the lateral right breast at the site of dimpling. Pending biopsy results will dictate next steps. If benign, we did discuss surgical biopsy of the dimpled area on the right for definitive diagnosis.           Jazmin Rosales MD      Please route or send letter to:  Primary Care Provider (PCP) and Referring Provider

## 2020-02-07 ENCOUNTER — OFFICE VISIT (OUTPATIENT)
Dept: SURGERY | Facility: CLINIC | Age: 45
End: 2020-02-07
Payer: COMMERCIAL

## 2020-02-07 VITALS
SYSTOLIC BLOOD PRESSURE: 94 MMHG | DIASTOLIC BLOOD PRESSURE: 50 MMHG | WEIGHT: 115 LBS | HEART RATE: 101 BPM | HEIGHT: 64 IN | BODY MASS INDEX: 19.63 KG/M2

## 2020-02-07 DIAGNOSIS — N63.10 BREAST MASS, RIGHT: Primary | ICD-10-CM

## 2020-02-07 PROCEDURE — 99204 OFFICE O/P NEW MOD 45 MIN: CPT | Performed by: SURGERY

## 2020-02-07 ASSESSMENT — MIFFLIN-ST. JEOR: SCORE: 1161.64

## 2020-02-07 NOTE — NURSING NOTE
Breast Patients    BREAST PATIENTS (ALL)    1-Do you have any of the following symptoms? Other: dimpling  2-In which breast are you having the symptoms? right  3-Have you had a Mammogram? Yes  Where: [facility]  Date: 10/2019  4-Have you ever had a breast cyst drained? No  5-Have you ever had a breast biopsy? No  6-Have you ever had a Breast Cancer? No   7-Is there a history of Breast Cancer in your family? Yes   Relationship to you:    Mother  Grandmother  8-Have you ever had Ovarian Cancer? No  9-Is there a history of Ovarian Cancer in your family? No  10-Summarize your caffeine intake (i.e. coffee, tea, chocolate, soda etc.): 1 cup of coffee per day    BREAST PATIENTS (FEMALE)    11-What age did your periods begin? 16  12-Date your last menstrual period began? 1/25/20  13-Number of full-term pregnancies: 1  14-Your age when your first child was born? 29  15-Did you nurse your children? No  16-Are you pregnant now? No  17-Have you begun menopause? No  18-Have you had either ovary removed?No  19-Do you have breast implants? No   20-Do you use hormone replacement therapy?  No  21-Have you taken oral contraceptive pills?  No  22-Have you had an intrauterine device (IUD) placed?  No  23-What is your current bra size?  32C    Cheyenne Baxter CMA  2/7/2020      2:34 PM

## 2020-02-10 ENCOUNTER — TELEPHONE (OUTPATIENT)
Dept: SURGERY | Facility: CLINIC | Age: 45
End: 2020-02-10

## 2020-02-10 ENCOUNTER — ANCILLARY PROCEDURE (OUTPATIENT)
Dept: MAMMOGRAPHY | Facility: CLINIC | Age: 45
End: 2020-02-10
Attending: SURGERY
Payer: COMMERCIAL

## 2020-02-10 DIAGNOSIS — N63.10 BREAST MASS, RIGHT: ICD-10-CM

## 2020-02-10 DIAGNOSIS — R23.4 BREAST SKIN CHANGES: Primary | ICD-10-CM

## 2020-02-10 RX ORDER — LIDOCAINE HYDROCHLORIDE 10 MG/ML
10 INJECTION, SOLUTION EPIDURAL; INFILTRATION; INTRACAUDAL; PERINEURAL ONCE
Status: COMPLETED | OUTPATIENT
Start: 2020-02-10 | End: 2020-02-10

## 2020-02-10 RX ADMIN — LIDOCAINE HYDROCHLORIDE 10 ML: 10 INJECTION, SOLUTION EPIDURAL; INFILTRATION; INTRACAUDAL; PERINEURAL at 13:53

## 2020-02-10 NOTE — TELEPHONE ENCOUNTER
Patient seen by Dr. Rosales on 2/7/2020 for right breast dimpling. Niomi is scheduled for right breast ultrasound possible biopsy today. Niomi called today to request left breast ultrasound to evaluated an indentation of lateral left breast.    Above reviewed with Dr. Malagon. Ok to add on. Orders entered.    Janice JOSHUAN, RN, OCN  Oncology Care Coordinator  [facility]  [facility]  Phone: [phone]

## 2020-02-10 NOTE — TELEPHONE ENCOUNTER
Dae notified, per Dr. Rosales, ok to add on left breast ultrasound. Please check in at 12:45 at the [facility] per Sam in scheduling. Dae verbalized understanding.    Janice JOSHUAN, RN, OCN  Oncology Care Coordinator  [facility]  [facility]  Phone: [phone]

## 2020-02-11 ENCOUNTER — TELEPHONE (OUTPATIENT)
Dept: MAMMOGRAPHY | Facility: CLINIC | Age: 45
End: 2020-02-11

## 2020-02-11 ENCOUNTER — TELEPHONE (OUTPATIENT)
Dept: SURGERY | Facility: PHYSICIAN GROUP | Age: 45
End: 2020-02-11

## 2020-02-11 DIAGNOSIS — Z91.89 AT HIGH RISK FOR BREAST CANCER: Primary | ICD-10-CM

## 2020-02-11 LAB — COPATH REPORT: NORMAL

## 2020-02-11 NOTE — TELEPHONE ENCOUNTER
Spoke to Dae about the benign finding of a fibroadenoma found during her breast biopsy done yesterday (2/10/20).  We discussed the Radiologist's recommendation of continuing on with her yearly screening mammogram.  Dae verbalized understanding and all questions and concerns were answered at this time.

## 2020-02-11 NOTE — TELEPHONE ENCOUNTER
I called Dae and discussed her pathology report from the right breast biopsy. It did reveal a fibroadenoma, no aytpia. We discussed fibroadenomas. She does report some pain associated with it but also notes bilateral breast pain. At this time she is not interested in surgical excision. I also calculated her lifetime risk of breast cancer using the LORENZO calculator and her risk is 26.5%. as such, it would be reasonable to proceed with high risk screening, alternating breast MRI and mammogram. Plan for mammogram in March and MRI in October 2020. Orders placed.     Jazmin Rosales MD  [facility]  [phone]

## 2021-01-15 ENCOUNTER — HEALTH MAINTENANCE LETTER (OUTPATIENT)
Age: 46
End: 2021-01-15

## 2021-01-27 ENCOUNTER — HOSPITAL ENCOUNTER (OUTPATIENT)
Dept: MAMMOGRAPHY | Facility: CLINIC | Age: 46
End: 2021-01-27
Attending: SURGERY
Payer: COMMERCIAL

## 2021-01-27 DIAGNOSIS — R23.4 BREAST SKIN CHANGES: ICD-10-CM

## 2021-01-27 PROCEDURE — 76642 ULTRASOUND BREAST LIMITED: CPT | Mod: RT

## 2021-01-27 PROCEDURE — G0279 TOMOSYNTHESIS, MAMMO: HCPCS

## 2021-08-23 ENCOUNTER — APPOINTMENT (OUTPATIENT)
Dept: CT IMAGING | Facility: CLINIC | Age: 46
End: 2021-08-23
Attending: EMERGENCY MEDICINE
Payer: COMMERCIAL

## 2021-08-23 ENCOUNTER — ANESTHESIA EVENT (OUTPATIENT)
Dept: SURGERY | Facility: CLINIC | Age: 46
End: 2021-08-23
Payer: COMMERCIAL

## 2021-08-23 ENCOUNTER — HOSPITAL ENCOUNTER (OUTPATIENT)
Facility: CLINIC | Age: 46
Setting detail: OBSERVATION
Discharge: HOME | End: 2021-08-26
Attending: EMERGENCY MEDICINE | Admitting: SURGERY
Payer: COMMERCIAL

## 2021-08-23 ENCOUNTER — TRANSFERRED RECORDS (OUTPATIENT)
Dept: HEALTH INFORMATION MANAGEMENT | Facility: CLINIC | Age: 46
End: 2021-08-23

## 2021-08-23 ENCOUNTER — ANESTHESIA (OUTPATIENT)
Dept: SURGERY | Facility: CLINIC | Age: 46
End: 2021-08-23
Payer: COMMERCIAL

## 2021-08-23 DIAGNOSIS — K31.89 MASS OF DUODENUM: ICD-10-CM

## 2021-08-23 DIAGNOSIS — R10.84 ABDOMINAL PAIN, GENERALIZED: ICD-10-CM

## 2021-08-23 DIAGNOSIS — K35.209 ACUTE APPENDICITIS WITH GENERALIZED PERITONITIS, WITHOUT GANGRENE OR ABSCESS, UNSPECIFIED WHETHER PERFORATION PRESENT: ICD-10-CM

## 2021-08-23 DIAGNOSIS — R74.8 ELEVATED LIPASE: ICD-10-CM

## 2021-08-23 DIAGNOSIS — Z98.890 PONV (POSTOPERATIVE NAUSEA AND VOMITING): Primary | ICD-10-CM

## 2021-08-23 DIAGNOSIS — G89.18 ACUTE POST-OPERATIVE PAIN: ICD-10-CM

## 2021-08-23 DIAGNOSIS — R11.2 PONV (POSTOPERATIVE NAUSEA AND VOMITING): Primary | ICD-10-CM

## 2021-08-23 LAB
ALBUMIN SERPL-MCNC: 2.4 G/DL (ref 3.4–5)
ALP SERPL-CCNC: 31 U/L (ref 40–150)
ALT SERPL W P-5'-P-CCNC: 29 U/L (ref 0–50)
ANION GAP SERPL CALCULATED.3IONS-SCNC: 5 MMOL/L (ref 3–14)
AST SERPL W P-5'-P-CCNC: 18 U/L (ref 0–45)
BASOPHILS # BLD AUTO: 0 10E3/UL (ref 0–0.2)
BASOPHILS NFR BLD AUTO: 0 %
BILIRUB SERPL-MCNC: 0.3 MG/DL (ref 0.2–1.3)
BUN SERPL-MCNC: 7 MG/DL (ref 7–30)
CALCIUM SERPL-MCNC: 6.1 MG/DL (ref 8.5–10.1)
CHLORIDE BLD-SCNC: 121 MMOL/L (ref 94–109)
CO2 SERPL-SCNC: 18 MMOL/L (ref 20–32)
CREAT SERPL-MCNC: 0.61 MG/DL (ref 0.52–1.04)
EOSINOPHIL # BLD AUTO: 0 10E3/UL (ref 0–0.7)
EOSINOPHIL NFR BLD AUTO: 0 %
ERYTHROCYTE [DISTWIDTH] IN BLOOD BY AUTOMATED COUNT: 11.3 % (ref 10–15)
GFR SERPL CREATININE-BSD FRML MDRD: >90 ML/MIN/1.73M2
GLUCOSE BLD-MCNC: 64 MG/DL (ref 70–99)
HCG SERPL QL: NEGATIVE
HCT VFR BLD AUTO: 37.2 % (ref 35–47)
HGB BLD-MCNC: 12.6 G/DL (ref 11.7–15.7)
IMM GRANULOCYTES # BLD: 0.1 10E3/UL
IMM GRANULOCYTES NFR BLD: 1 %
LIPASE SERPL-CCNC: 459 U/L (ref 73–393)
LYMPHOCYTES # BLD AUTO: 1.6 10E3/UL (ref 0.8–5.3)
LYMPHOCYTES NFR BLD AUTO: 13 %
MAGNESIUM SERPL-MCNC: 1.7 MG/DL (ref 1.6–2.3)
MCH RBC QN AUTO: 30.7 PG (ref 26.5–33)
MCHC RBC AUTO-ENTMCNC: 33.9 G/DL (ref 31.5–36.5)
MCV RBC AUTO: 91 FL (ref 78–100)
MONOCYTES # BLD AUTO: 0.9 10E3/UL (ref 0–1.3)
MONOCYTES NFR BLD AUTO: 7 %
NEUTROPHILS # BLD AUTO: 10.1 10E3/UL (ref 1.6–8.3)
NEUTROPHILS NFR BLD AUTO: 79 %
NRBC # BLD AUTO: 0 10E3/UL
NRBC BLD AUTO-RTO: 0 /100
PLATELET # BLD AUTO: 229 10E3/UL (ref 150–450)
POTASSIUM BLD-SCNC: 2.7 MMOL/L (ref 3.4–5.3)
PROT SERPL-MCNC: 5.1 G/DL (ref 6.8–8.8)
RBC # BLD AUTO: 4.11 10E6/UL (ref 3.8–5.2)
SARS-COV-2 RNA RESP QL NAA+PROBE: NEGATIVE
SODIUM SERPL-SCNC: 144 MMOL/L (ref 133–144)
WBC # BLD AUTO: 12.8 10E3/UL (ref 4–11)

## 2021-08-23 PROCEDURE — 250N000009 HC RX 250: Performed by: ANESTHESIOLOGY

## 2021-08-23 PROCEDURE — 84703 CHORIONIC GONADOTROPIN ASSAY: CPT | Performed by: EMERGENCY MEDICINE

## 2021-08-23 PROCEDURE — 258N000003 HC RX IP 258 OP 636: Performed by: ANESTHESIOLOGY

## 2021-08-23 PROCEDURE — C9803 HOPD COVID-19 SPEC COLLECT: HCPCS

## 2021-08-23 PROCEDURE — 36415 COLL VENOUS BLD VENIPUNCTURE: CPT | Performed by: EMERGENCY MEDICINE

## 2021-08-23 PROCEDURE — 250N000009 HC RX 250: Performed by: SURGERY

## 2021-08-23 PROCEDURE — 120N000001 HC R&B MED SURG/OB

## 2021-08-23 PROCEDURE — 250N000011 HC RX IP 250 OP 636: Performed by: ANESTHESIOLOGY

## 2021-08-23 PROCEDURE — 87635 SARS-COV-2 COVID-19 AMP PRB: CPT | Performed by: EMERGENCY MEDICINE

## 2021-08-23 PROCEDURE — 82040 ASSAY OF SERUM ALBUMIN: CPT | Performed by: EMERGENCY MEDICINE

## 2021-08-23 PROCEDURE — 272N000001 HC OR GENERAL SUPPLY STERILE: Performed by: SURGERY

## 2021-08-23 PROCEDURE — 99223 1ST HOSP IP/OBS HIGH 75: CPT | Mod: AI | Performed by: HOSPITALIST

## 2021-08-23 PROCEDURE — 88304 TISSUE EXAM BY PATHOLOGIST: CPT | Mod: TC | Performed by: SURGERY

## 2021-08-23 PROCEDURE — 999N000141 HC STATISTIC PRE-PROCEDURE NURSING ASSESSMENT: Performed by: SURGERY

## 2021-08-23 PROCEDURE — 250N000011 HC RX IP 250 OP 636

## 2021-08-23 PROCEDURE — 250N000009 HC RX 250: Performed by: EMERGENCY MEDICINE

## 2021-08-23 PROCEDURE — 99214 OFFICE O/P EST MOD 30 MIN: CPT | Performed by: SURGERY

## 2021-08-23 PROCEDURE — 83690 ASSAY OF LIPASE: CPT | Performed by: EMERGENCY MEDICINE

## 2021-08-23 PROCEDURE — 250N000011 HC RX IP 250 OP 636: Performed by: EMERGENCY MEDICINE

## 2021-08-23 PROCEDURE — 360N000076 HC SURGERY LEVEL 3, PER MIN: Performed by: SURGERY

## 2021-08-23 PROCEDURE — 44970 LAPAROSCOPY APPENDECTOMY: CPT | Performed by: SURGERY

## 2021-08-23 PROCEDURE — 74177 CT ABD & PELVIS W/CONTRAST: CPT

## 2021-08-23 PROCEDURE — 258N000003 HC RX IP 258 OP 636: Performed by: EMERGENCY MEDICINE

## 2021-08-23 PROCEDURE — 370N000017 HC ANESTHESIA TECHNICAL FEE, PER MIN: Performed by: SURGERY

## 2021-08-23 PROCEDURE — 99285 EMERGENCY DEPT VISIT HI MDM: CPT | Mod: 25

## 2021-08-23 PROCEDURE — 96361 HYDRATE IV INFUSION ADD-ON: CPT

## 2021-08-23 PROCEDURE — 710N000009 HC RECOVERY PHASE 1, LEVEL 1, PER MIN: Performed by: SURGERY

## 2021-08-23 PROCEDURE — 250N000025 HC SEVOFLURANE, PER MIN: Performed by: SURGERY

## 2021-08-23 PROCEDURE — 96375 TX/PRO/DX INJ NEW DRUG ADDON: CPT | Mod: XU

## 2021-08-23 PROCEDURE — 85025 COMPLETE CBC W/AUTO DIFF WBC: CPT | Performed by: EMERGENCY MEDICINE

## 2021-08-23 PROCEDURE — 83735 ASSAY OF MAGNESIUM: CPT | Performed by: EMERGENCY MEDICINE

## 2021-08-23 PROCEDURE — 96376 TX/PRO/DX INJ SAME DRUG ADON: CPT

## 2021-08-23 PROCEDURE — 96374 THER/PROPH/DIAG INJ IV PUSH: CPT | Mod: XU

## 2021-08-23 RX ORDER — NALOXONE HYDROCHLORIDE 0.4 MG/ML
0.4 INJECTION, SOLUTION INTRAMUSCULAR; INTRAVENOUS; SUBCUTANEOUS
Status: DISCONTINUED | OUTPATIENT
Start: 2021-08-23 | End: 2021-08-26 | Stop reason: HOSPADM

## 2021-08-23 RX ORDER — HYDROMORPHONE HCL IN WATER/PF 6 MG/30 ML
0.2 PATIENT CONTROLLED ANALGESIA SYRINGE INTRAVENOUS
Status: DISCONTINUED | OUTPATIENT
Start: 2021-08-23 | End: 2021-08-26 | Stop reason: HOSPADM

## 2021-08-23 RX ORDER — SODIUM CHLORIDE, SODIUM LACTATE, POTASSIUM CHLORIDE, CALCIUM CHLORIDE 600; 310; 30; 20 MG/100ML; MG/100ML; MG/100ML; MG/100ML
INJECTION, SOLUTION INTRAVENOUS CONTINUOUS
Status: DISCONTINUED | OUTPATIENT
Start: 2021-08-23 | End: 2021-08-23 | Stop reason: HOSPADM

## 2021-08-23 RX ORDER — ONDANSETRON 4 MG/1
4 TABLET, ORALLY DISINTEGRATING ORAL EVERY 6 HOURS PRN
Status: DISCONTINUED | OUTPATIENT
Start: 2021-08-23 | End: 2021-08-26 | Stop reason: HOSPADM

## 2021-08-23 RX ORDER — KETOROLAC TROMETHAMINE 30 MG/ML
30 INJECTION, SOLUTION INTRAMUSCULAR; INTRAVENOUS ONCE
Status: COMPLETED | OUTPATIENT
Start: 2021-08-23 | End: 2021-08-23

## 2021-08-23 RX ORDER — PROPOFOL 10 MG/ML
INJECTION, EMULSION INTRAVENOUS PRN
Status: DISCONTINUED | OUTPATIENT
Start: 2021-08-23 | End: 2021-08-23

## 2021-08-23 RX ORDER — HYDROMORPHONE HYDROCHLORIDE 1 MG/ML
0.5 INJECTION, SOLUTION INTRAMUSCULAR; INTRAVENOUS; SUBCUTANEOUS
Status: DISCONTINUED | OUTPATIENT
Start: 2021-08-23 | End: 2021-08-23

## 2021-08-23 RX ORDER — ACETAMINOPHEN 650 MG/1
650 SUPPOSITORY RECTAL EVERY 6 HOURS PRN
Status: DISCONTINUED | OUTPATIENT
Start: 2021-08-23 | End: 2021-08-26 | Stop reason: HOSPADM

## 2021-08-23 RX ORDER — PROPOFOL 10 MG/ML
INJECTION, EMULSION INTRAVENOUS CONTINUOUS PRN
Status: DISCONTINUED | OUTPATIENT
Start: 2021-08-23 | End: 2021-08-23

## 2021-08-23 RX ORDER — LIDOCAINE 40 MG/G
CREAM TOPICAL
Status: DISCONTINUED | OUTPATIENT
Start: 2021-08-23 | End: 2021-08-26 | Stop reason: HOSPADM

## 2021-08-23 RX ORDER — IOPAMIDOL 755 MG/ML
58 INJECTION, SOLUTION INTRAVASCULAR ONCE
Status: COMPLETED | OUTPATIENT
Start: 2021-08-23 | End: 2021-08-23

## 2021-08-23 RX ORDER — LIDOCAINE HYDROCHLORIDE 20 MG/ML
INJECTION, SOLUTION INFILTRATION; PERINEURAL PRN
Status: DISCONTINUED | OUTPATIENT
Start: 2021-08-23 | End: 2021-08-23

## 2021-08-23 RX ORDER — MAGNESIUM SULFATE HEPTAHYDRATE 40 MG/ML
2 INJECTION, SOLUTION INTRAVENOUS ONCE
Status: COMPLETED | OUTPATIENT
Start: 2021-08-23 | End: 2021-08-23

## 2021-08-23 RX ORDER — SODIUM CHLORIDE, SODIUM LACTATE, POTASSIUM CHLORIDE, CALCIUM CHLORIDE 600; 310; 30; 20 MG/100ML; MG/100ML; MG/100ML; MG/100ML
INJECTION, SOLUTION INTRAVENOUS CONTINUOUS
Status: CANCELLED | OUTPATIENT
Start: 2021-08-23

## 2021-08-23 RX ORDER — MAGNESIUM HYDROXIDE 1200 MG/15ML
LIQUID ORAL PRN
Status: DISCONTINUED | OUTPATIENT
Start: 2021-08-23 | End: 2021-08-23 | Stop reason: HOSPADM

## 2021-08-23 RX ORDER — NALOXONE HYDROCHLORIDE 0.4 MG/ML
0.2 INJECTION, SOLUTION INTRAMUSCULAR; INTRAVENOUS; SUBCUTANEOUS
Status: DISCONTINUED | OUTPATIENT
Start: 2021-08-23 | End: 2021-08-26 | Stop reason: HOSPADM

## 2021-08-23 RX ORDER — ONDANSETRON 2 MG/ML
4 INJECTION INTRAMUSCULAR; INTRAVENOUS EVERY 30 MIN PRN
Status: DISCONTINUED | OUTPATIENT
Start: 2021-08-23 | End: 2021-08-23 | Stop reason: HOSPADM

## 2021-08-23 RX ORDER — ONDANSETRON 2 MG/ML
4 INJECTION INTRAMUSCULAR; INTRAVENOUS EVERY 6 HOURS PRN
Status: DISCONTINUED | OUTPATIENT
Start: 2021-08-23 | End: 2021-08-26 | Stop reason: HOSPADM

## 2021-08-23 RX ORDER — AMOXICILLIN 250 MG
2 CAPSULE ORAL 2 TIMES DAILY PRN
Status: DISCONTINUED | OUTPATIENT
Start: 2021-08-23 | End: 2021-08-26 | Stop reason: HOSPADM

## 2021-08-23 RX ORDER — FENTANYL CITRATE 50 UG/ML
INJECTION, SOLUTION INTRAMUSCULAR; INTRAVENOUS PRN
Status: DISCONTINUED | OUTPATIENT
Start: 2021-08-23 | End: 2021-08-23

## 2021-08-23 RX ORDER — LIDOCAINE 40 MG/G
CREAM TOPICAL
Status: CANCELLED | OUTPATIENT
Start: 2021-08-23

## 2021-08-23 RX ORDER — PROCHLORPERAZINE 25 MG
25 SUPPOSITORY, RECTAL RECTAL EVERY 12 HOURS PRN
Status: DISCONTINUED | OUTPATIENT
Start: 2021-08-23 | End: 2021-08-26 | Stop reason: HOSPADM

## 2021-08-23 RX ORDER — EPHEDRINE SULFATE 50 MG/ML
INJECTION, SOLUTION INTRAMUSCULAR; INTRAVENOUS; SUBCUTANEOUS PRN
Status: DISCONTINUED | OUTPATIENT
Start: 2021-08-23 | End: 2021-08-23

## 2021-08-23 RX ORDER — PROCHLORPERAZINE MALEATE 10 MG
10 TABLET ORAL EVERY 6 HOURS PRN
Status: DISCONTINUED | OUTPATIENT
Start: 2021-08-23 | End: 2021-08-26 | Stop reason: HOSPADM

## 2021-08-23 RX ORDER — POLYETHYLENE GLYCOL 3350 17 G/17G
17 POWDER, FOR SOLUTION ORAL DAILY PRN
Status: DISCONTINUED | OUTPATIENT
Start: 2021-08-23 | End: 2021-08-26 | Stop reason: HOSPADM

## 2021-08-23 RX ORDER — ONDANSETRON 2 MG/ML
4 INJECTION INTRAMUSCULAR; INTRAVENOUS ONCE
Status: COMPLETED | OUTPATIENT
Start: 2021-08-23 | End: 2021-08-23

## 2021-08-23 RX ORDER — MEPERIDINE HYDROCHLORIDE 25 MG/ML
12.5 INJECTION INTRAMUSCULAR; INTRAVENOUS; SUBCUTANEOUS ONCE
Status: DISCONTINUED | OUTPATIENT
Start: 2021-08-23 | End: 2021-08-23 | Stop reason: HOSPADM

## 2021-08-23 RX ORDER — HYDROMORPHONE HCL IN WATER/PF 6 MG/30 ML
0.4 PATIENT CONTROLLED ANALGESIA SYRINGE INTRAVENOUS EVERY 5 MIN PRN
Status: DISCONTINUED | OUTPATIENT
Start: 2021-08-23 | End: 2021-08-23 | Stop reason: HOSPADM

## 2021-08-23 RX ORDER — ONDANSETRON 2 MG/ML
INJECTION INTRAMUSCULAR; INTRAVENOUS
Status: COMPLETED
Start: 2021-08-23 | End: 2021-08-23

## 2021-08-23 RX ORDER — AMOXICILLIN 250 MG
1 CAPSULE ORAL 2 TIMES DAILY PRN
Status: DISCONTINUED | OUTPATIENT
Start: 2021-08-23 | End: 2021-08-26 | Stop reason: HOSPADM

## 2021-08-23 RX ORDER — ONDANSETRON 4 MG/1
4 TABLET, ORALLY DISINTEGRATING ORAL EVERY 30 MIN PRN
Status: DISCONTINUED | OUTPATIENT
Start: 2021-08-23 | End: 2021-08-23 | Stop reason: HOSPADM

## 2021-08-23 RX ORDER — ACETAMINOPHEN 325 MG/1
650 TABLET ORAL EVERY 6 HOURS PRN
Status: DISCONTINUED | OUTPATIENT
Start: 2021-08-23 | End: 2021-08-26 | Stop reason: HOSPADM

## 2021-08-23 RX ORDER — OXYCODONE HYDROCHLORIDE 5 MG/1
5 TABLET ORAL EVERY 4 HOURS PRN
Status: DISCONTINUED | OUTPATIENT
Start: 2021-08-23 | End: 2021-08-24

## 2021-08-23 RX ORDER — OXYCODONE HYDROCHLORIDE 5 MG/1
5 TABLET ORAL EVERY 4 HOURS PRN
Status: DISCONTINUED | OUTPATIENT
Start: 2021-08-23 | End: 2021-08-23 | Stop reason: HOSPADM

## 2021-08-23 RX ORDER — FENTANYL CITRATE 50 UG/ML
50 INJECTION, SOLUTION INTRAMUSCULAR; INTRAVENOUS EVERY 5 MIN PRN
Status: DISCONTINUED | OUTPATIENT
Start: 2021-08-23 | End: 2021-08-23 | Stop reason: HOSPADM

## 2021-08-23 RX ORDER — SODIUM CHLORIDE, SODIUM LACTATE, POTASSIUM CHLORIDE, CALCIUM CHLORIDE 600; 310; 30; 20 MG/100ML; MG/100ML; MG/100ML; MG/100ML
INJECTION, SOLUTION INTRAVENOUS CONTINUOUS PRN
Status: DISCONTINUED | OUTPATIENT
Start: 2021-08-23 | End: 2021-08-23

## 2021-08-23 RX ORDER — PIPERACILLIN SODIUM, TAZOBACTAM SODIUM 4; .5 G/20ML; G/20ML
4.5 INJECTION, POWDER, LYOPHILIZED, FOR SOLUTION INTRAVENOUS ONCE
Status: COMPLETED | OUTPATIENT
Start: 2021-08-23 | End: 2021-08-23

## 2021-08-23 RX ORDER — DEXAMETHASONE SODIUM PHOSPHATE 4 MG/ML
INJECTION, SOLUTION INTRA-ARTICULAR; INTRALESIONAL; INTRAMUSCULAR; INTRAVENOUS; SOFT TISSUE PRN
Status: DISCONTINUED | OUTPATIENT
Start: 2021-08-23 | End: 2021-08-23

## 2021-08-23 RX ORDER — POTASSIUM CHLORIDE 7.45 MG/ML
10 INJECTION INTRAVENOUS ONCE
Status: COMPLETED | OUTPATIENT
Start: 2021-08-23 | End: 2021-08-23

## 2021-08-23 RX ADMIN — PIPERACILLIN SODIUM AND TAZOBACTAM SODIUM 4.5 G: 4; .5 INJECTION, POWDER, LYOPHILIZED, FOR SOLUTION INTRAVENOUS at 18:20

## 2021-08-23 RX ADMIN — SODIUM CHLORIDE 60 ML: 9 INJECTION, SOLUTION INTRAVENOUS at 17:10

## 2021-08-23 RX ADMIN — PHENYLEPHRINE HYDROCHLORIDE 100 MCG: 10 INJECTION INTRAVENOUS at 20:13

## 2021-08-23 RX ADMIN — ROCURONIUM BROMIDE 25 MG: 10 INJECTION INTRAVENOUS at 20:18

## 2021-08-23 RX ADMIN — HYDROMORPHONE HYDROCHLORIDE 0.4 MG: 0.2 INJECTION, SOLUTION INTRAMUSCULAR; INTRAVENOUS; SUBCUTANEOUS at 21:37

## 2021-08-23 RX ADMIN — FENTANYL CITRATE 50 MCG: 50 INJECTION, SOLUTION INTRAMUSCULAR; INTRAVENOUS at 20:03

## 2021-08-23 RX ADMIN — DEXAMETHASONE SODIUM PHOSPHATE 4 MG: 4 INJECTION, SOLUTION INTRA-ARTICULAR; INTRALESIONAL; INTRAMUSCULAR; INTRAVENOUS; SOFT TISSUE at 20:17

## 2021-08-23 RX ADMIN — MAGNESIUM SULFATE HEPTAHYDRATE 2 G: 40 INJECTION, SOLUTION INTRAVENOUS at 21:21

## 2021-08-23 RX ADMIN — ONDANSETRON 4 MG: 2 INJECTION INTRAMUSCULAR; INTRAVENOUS at 18:54

## 2021-08-23 RX ADMIN — POTASSIUM CHLORIDE 10 MEQ: 7.46 INJECTION, SOLUTION INTRAVENOUS at 19:54

## 2021-08-23 RX ADMIN — FENTANYL CITRATE 50 MCG: 50 INJECTION, SOLUTION INTRAMUSCULAR; INTRAVENOUS at 21:13

## 2021-08-23 RX ADMIN — PHENYLEPHRINE HYDROCHLORIDE 100 MCG: 10 INJECTION INTRAVENOUS at 20:26

## 2021-08-23 RX ADMIN — KETOROLAC TROMETHAMINE 30 MG: 30 INJECTION, SOLUTION INTRAMUSCULAR at 21:53

## 2021-08-23 RX ADMIN — HYDROMORPHONE HYDROCHLORIDE 1 MG: 1 INJECTION, SOLUTION INTRAMUSCULAR; INTRAVENOUS; SUBCUTANEOUS at 16:35

## 2021-08-23 RX ADMIN — FENTANYL CITRATE 50 MCG: 50 INJECTION, SOLUTION INTRAMUSCULAR; INTRAVENOUS at 20:23

## 2021-08-23 RX ADMIN — PROPOFOL 160 MG: 10 INJECTION, EMULSION INTRAVENOUS at 20:10

## 2021-08-23 RX ADMIN — SUCCINYLCHOLINE CHLORIDE 100 MG: 20 INJECTION, SOLUTION INTRAMUSCULAR; INTRAVENOUS; PARENTERAL at 20:10

## 2021-08-23 RX ADMIN — SODIUM CHLORIDE 1000 ML: 9 INJECTION, SOLUTION INTRAVENOUS at 16:35

## 2021-08-23 RX ADMIN — LIDOCAINE HYDROCHLORIDE 60 MG: 20 INJECTION, SOLUTION INFILTRATION; PERINEURAL at 20:10

## 2021-08-23 RX ADMIN — HYDROMORPHONE HYDROCHLORIDE 0.5 MG: 1 INJECTION, SOLUTION INTRAMUSCULAR; INTRAVENOUS; SUBCUTANEOUS at 18:52

## 2021-08-23 RX ADMIN — PROPOFOL 50 MCG/KG/MIN: 10 INJECTION, EMULSION INTRAVENOUS at 20:17

## 2021-08-23 RX ADMIN — IOPAMIDOL 58 ML: 755 INJECTION, SOLUTION INTRAVENOUS at 17:02

## 2021-08-23 RX ADMIN — HYDROMORPHONE HYDROCHLORIDE 0.4 MG: 0.2 INJECTION, SOLUTION INTRAMUSCULAR; INTRAVENOUS; SUBCUTANEOUS at 22:34

## 2021-08-23 RX ADMIN — MIDAZOLAM 1 MG: 1 INJECTION INTRAMUSCULAR; INTRAVENOUS at 20:03

## 2021-08-23 RX ADMIN — ONDANSETRON 4 MG: 2 INJECTION INTRAMUSCULAR; INTRAVENOUS at 16:36

## 2021-08-23 RX ADMIN — Medication 5 MG: at 20:18

## 2021-08-23 RX ADMIN — SODIUM CHLORIDE, POTASSIUM CHLORIDE, SODIUM LACTATE AND CALCIUM CHLORIDE: 600; 310; 30; 20 INJECTION, SOLUTION INTRAVENOUS at 20:03

## 2021-08-23 RX ADMIN — PHENYLEPHRINE HYDROCHLORIDE 100 MCG: 10 INJECTION INTRAVENOUS at 20:15

## 2021-08-23 ASSESSMENT — ENCOUNTER SYMPTOMS
FEVER: 0
NAUSEA: 1
CHILLS: 0
APPETITE CHANGE: 1
DIARRHEA: 0
SHORTNESS OF BREATH: 0
VOMITING: 0
UNEXPECTED WEIGHT CHANGE: 1
ABDOMINAL PAIN: 1

## 2021-08-23 ASSESSMENT — LIFESTYLE VARIABLES: TOBACCO_USE: 0

## 2021-08-23 NOTE — ED TRIAGE NOTES
Patient from [facility] after colonoscopy, having increased abd pain and nausea afterwards. Was given zofran and 50mcg fentynyl by EMS.

## 2021-08-23 NOTE — ED PROVIDER NOTES
"  History   Chief Complaint:  Abdominal Pain and Nausea       HPI   Bassemi HAKAN Castañeda is a 46 year old female with history of ruptured ovarian cyst who presents via EMS with abdominal pain and nausea. The patient reports that she underwent a colonoscopy earlier today at [facility] as advised by her OB/GYN after experiencing diffuse, \"raw\" abdominal pain over the past month. Approximately 20 minutes after her colonoscopy, she reports that she began experiencing the same diffuse abdominal pain with associated nausea. She reports that her abdominal pain and nausea persisted, prompting her to alert EMS. En route, EMS reports that the patient was given 50 mcg fentanyl with mild improvement of her symptoms. Currently, she reports that her pain has returned and that it is worsened with any exertion or use of her abdominal muscles. Over the past month, she also notes a decreased appetite and weight loss of seven pounds. She denies any chest pain, shortness of breath, vomiting, diarrhea, fever, chills, or any other symptoms. Of note, she states that she also underwent an endoscopy that was concerning for a suspected duodenal ulcer.    Review of Systems   Constitutional: Positive for appetite change and unexpected weight change. Negative for chills and fever.   Respiratory: Negative for shortness of breath.    Cardiovascular: Negative for chest pain.   Gastrointestinal: Positive for abdominal pain and nausea. Negative for diarrhea and vomiting.   All other systems reviewed and are negative.      Allergies:  Codeine  Paroxetine  Venlafaxine    Medications:  Wellbutrin    Past Medical History:    Deviated nasal septum  Female infertility  Depression  Ruptured ovarian cyst  Serotonin syndrome  Myoclonic jerking  Fibromyalgia      Past Surgical History:    Aspiration and curettage  Nasal septum repair     Family History:    Lipids  Hypothyroidism   Breast cancer  Depression  Hypertension  MI    Social " History:  PCP: Brigitte Bryan  Presents to the ED via EMS.    Physical Exam     Patient Vitals for the past 24 hrs:   BP Temp Temp src Pulse Resp SpO2   08/23/21 1700 107/64 -- -- 90 -- 97 %   08/23/21 1645 111/64 -- -- -- -- 100 %   08/23/21 1502 110/67 98.3  F (36.8  C) Oral 77 18 95 %       Physical Exam   General:  Ill, uncomfortable-appearing middle aged woman. Resting supine, providing appropriate history.    Eye:  Pupils are equal, round, and reactive.  Extraocular movements intact.    ENT:  No rhinorrhea.  Moist mucus membranes.  Normal tongue and tonsil.    Cardiac:  Regular rate and rhythm.  No murmurs, gallops, or rubs.    Pulmonary:  Clear to auscultation bilaterally.  No wheezes, rales, or rhonchi.    Abdomen:  There is diffuse significant pain in all quadrants with positive rebound and guarding.     Musculoskeletal:  Normal movement of all extremities without evidence for deficit.    Skin:  Warm and dry without rashes.    Neurologic:  Non-focal exam without asymmetric weakness or numbness.     Psychiatric:  Normal affect with appropriate interaction with examiner.    Emergency Department Course     Imaging:    CT Abd/pelvis with contrast:  1.  Dilated appendix suggestive of acute appendicitis. No evidence of   perforation or abscess.   2.  Incidental note of possible nutcracker compression of the left   renal vein with collateral flow down the left ovarian vein that could   be associated with pelvic venous congestion syndrome. As per radiology.     Imaging independently reviewed and agree with radiologist interpretation.     Laboratory:     CBC: WBC 12.8 (H), HGB 12.6,     CMP: Potassium 2.7 (L), Chloride 121 (H), Carbon Dioxide 18 (L), Calcium 6.1 (L), Alkaline Phosphatase 31 (L), Protein Total 5.1 (L), Albumin 2.4 (L), o/w WNL (Creatinine 0.61)     Lipase: 459 (H)    Magnesium: 1.7    HCG Qualitative: Negative     Asymptomatic COVID19 Virus PCR by nasopharyngeal swab: In process    Emergency  Department Course:    Reviewed:  I reviewed nursing notes, vitals, past medical history and care everywhere.    Assessments:  1600 I obtained history and examined the patient as noted above.     1747 I rechecked the patient and explained findings.     1815 I returned to recheck the patient and explain the plan for admission.    Consults:  1755 I consulted with Dr. Contreras, General Surgery, regarding the patient's history and presentation here in the emergency department.    1811 I consulted with Dr. Contreras, again, regarding the patient's history and presentation here in the emergency department.    1825  I consulted with Dr. Elliott of the hospitalist services. They are in agreement to accept the patient for admission for further monitoring, evaluation, and treatment.    Interventions:  1635 NS, 1 L, IV  1635 Dilaudid 1 mg IV  1636 Zofran 4 mg IV  1820 Zosyn 4.5 g IV  1852 Dilaudid 0.5 mg IV  1854 Zofran 4 mg IV    Disposition:  The patient was admitted to the hospital under the care of Dr. Elliott.       Impression & Plan     Medical Decision Making:  This delightful 46-year-old woman without significant past medical history presents to us for assessment of abdominal pain and possible appendicitis after undergoing colonoscopy today.  The patient has been dealing with 2 to 3 weeks of progressive severe abdominal pain with an associated 7 to 8 pound weight loss with poor oral intake.  She has been seen at an outside freestanding ER with negative CT and negative labs.  She underwent an endoscopy earlier last week which showed a duodenal mass for which I do not have clear pathology or assessment findings from our gastroenterology team.  She then underwent a colonoscopy today, though she notes over the last 4 to 5 days her pain is greatly worsened, making it impossible for her to use her abdominal muscles or walking due to the severity of pain.  She notes the pain is diffuse without associated fever.  When she underwent her  colonoscopy, they found an appendicolith and recommended that she come to the ER for possible appendicitis.    On my exam, the patient appears clinically poor.  While her vital signs are normal, her abdomen is rigid with positive rebound and guarding.  Laboratory investigation was obtained which shows a new elevation of her white blood cell count along with a metabolic acidosis and hypokalemia and hypomagnesemia.  She was given pain medication and fluid with significant improvement in her symptoms.  CT was obtained which confirms a diagnosis of appendicitis.    With this, I spoke with Dr. Contreras of the general surgery team describing the scenario.  While we both agree that her 2 weeks of abdominal pain is highly unlikely to be related to appendicitis, she does show signs of acute appendicitis today and would benefit from appendectomy.  However, I do not see her as a simple case of appendectomy with discharge home, she will require ongoing admission and further work-up for this degree of discomfort that she has been experiencing along with weight loss and metabolic derangement.  Therefore, I then spoke with Dr. Elliott of the hospitalist service who admit the patient under inpatient status for further work-up.  The patient was given a dose of Zosyn along with repletion of electrolytes.  She was then transferred to the OR for definitive treatment with ongoing admission.  The patient's questions were answered and she is comfortable with this plan for admission.      Covid-19  Dae Castañeda was evaluated during a global COVID-19 pandemic, which necessitated consideration that the patient might be at risk for infection with the SARS-CoV-2 virus that causes COVID-19.   Applicable protocols for evaluation were followed during the patient's care.   COVID-19 was considered as part of the patient's evaluation. The plan for testing is:  a test was obtained during this visit.  Diagnosis:    ICD-10-CM    1. Acute  appendicitis with generalized peritonitis, without gangrene or abscess, unspecified whether perforation present  K35.20 Case Request: APPENDECTOMY, LAPAROSCOPIC     Case Request: APPENDECTOMY, LAPAROSCOPIC   2. Abdominal pain, generalized  R10.84    3. Mass of duodenum  K31.89    4. Elevated lipase  R74.8        Scribe Disclosure:  I, Troy Ilya, am serving as a scribe at 3:22 PM on 8/23/2021 to document services personally performed by Trierweiler, Chad A, MD based on my observations and the provider's statements to me.              Trierweiler, Chad A, MD  08/23/21 7244

## 2021-08-23 NOTE — H&P
[facility]    History and Physical - Hospitalist Service       Date of Admission:  8/23/2021    Assessment & Plan      Dae MCCLENDON Haroon Castañeda is a very pleasant 46 year old female with history of ruptured ovarian cyst, depression, and fibromyalgia who presented to the ED with ~2 weeks of abdominal pain.  She has been found to have acute appendicitis, however this is not felt to be the cause of her ongoing abdominal pain.       Acute appendicitis  Abdominal pain - 2+ weeks  Reported gastric mass on recent EGD???  2+ weeks of postprandial pain that has progressed.  Discussed with General Surgery - we are not convinced that the appendicitis is the culprit for the 2 weeks of abdominal pain. Leukocytosis noted.  Lipase elevated - unclear correlation/causation - CT showed normal pancreas. Differentials lengthy - including ovarian cysts, endometriosis, etc.   - admit to inpatient, given complexity of patient  - General surgery consulted  - [facility] consulted - colonoscopy today, recent EGD with ?? Mass reported - defer to [facility]  - prn analgesia and antiemetics  - NPO for surgery tonight  - tigre-operative antibiotics at discretion of surgery    Hypokalemia  Hyperchloremia  Non anion gap metabolic acidosis  Likely in relation to GI losses with diarrhea and decreased PO intake. Reported 8 lb weight loss in 2-3 weeks since abdominal pain.  Bicarb 18, K 2.7, Cl 121. Replacing via IV protocol - started in ED.  - replacement protocol for K and Mag (1.7)  - recheck in AM    COVID 19 screening  PCR negative 8/23/2021      Diet: NPO for Medical/Clinical Reasons Except for: Meds    DVT Prophylaxis: Pneumatic Compression Devices  Sal Catheter: Not present  Central Lines: None  Code Status:   Full Code    Clinically Significant Risk Factors Present on Admission        # Hypokalemia: K = 2.7 mmol/L (Ref range: 3.4 - 5.3 mmol/L) on admission, will replace as needed            Disposition Plan   Expected  discharge: likely home in 2 days pending OR and further work up/findings, and when food is tolerated     The patient's care was discussed with the Bedside Nurse, Patient, General Surgery Consultant and ED Provider.    Davi Elliott MD  [facility]  Securely message with the Vocera Web Console (learn more here)  Text page via [facility] Paging/Directory      ______________________________________________________________________    Chief Complaint   Abdominal pain for 2 weeks    History is obtained from the patient    History of Present Illness   Dae Castañeda is a very pleasant 46 year old female with history of ruptured ovarian cyst, depression, and fibromyalgia who presented to the ED with ~2 weeks of abdominal pain.  This has been associated with nausea at times but she is otherwise not been vomiting.  She has also had diarrhea-no blood or black stools reported.  Over the past 2 weeks she has had increasing fairly diffuse abdominal pain which has been typically worsened with food.  More recently any movement has also exacerbated her discomfort.  She denies any flank pain or back pain.  She reports having a colonoscopy earlier today with [facility] at the recommendation of her OB/GYN due to the aforementioned complaints.  She also reports having had an EGD recently that found some kind of mass-I am not sure the exact details or pathology of this currently.  After the colonoscopy today she had worsening diffuse abdominal pain and nausea and therefore called 911.  She received fentanyl in route to the hospital with some improvement.  She denies any fevers, chills, shortness of breath, coughing, chest pain, dysuria, or lower extremity swelling.  She does endorse losing 8 pounds in the last few weeks.  No sick contacts or recent travel reported.    In the ED patient was seen by Dr. Trierweiler with whom I discussed the case.  Patient is afebrile, hemodynamically stable, and  saturating properly on room air.  She appears fatigued in general and at times uncomfortable particularly with movement.  Blood work notable for a potassium of 2.7, chloride 121, bicarb of 18, element 2.4, and lipase elevated 459.  hCG negative.  Mild leukocytosis of 12.8k.  COVID-19 PCR negative.  CT abdomen pelvis with contrast showed evidence of acute appendicitis-no perforation or abscess visualized.  Incidental note of possible nutcracker compression of the left renal vein with collateral flow down the left ovarian vein that could be associated with pelvic venous congestion syndrome.  General surgery is planning laparoscopic appendectomy in [place] [facility] has been consulted for continuing care.      Review of Systems    The 10 point Review of Systems is negative other than noted in the HPI or here.     Past Medical History    I have reviewed this patient's medical history and updated it with pertinent information if needed.   Past Medical History:   Diagnosis Date     Abnormal involuntary movement 1/31/2017    Author: Shaheed Freeman MD Service: Hospitalist Author Type: Physician    Filed: 5/14/2015  8:10 PM Note Time: 5/14/2015 10:35 AM Status: Signed   : Shaheed Freeman MD (Physician)     Expand All Collapse All    DATE OF ADMISSION:  05/12/2015      DATE OF DISCHARGE:  05/14/2015      PRIMARY CARE PHYSICIAN:  Nancy Brady MD      CHIEF COMPLAINT:  Involuntary jerky movements.         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 Orders    EEG        Expand All Collapse All    ONE-HOUR VIDEO ELECTROENCEPHALOGRAM       ORDERING PHYSICIAN:  Lito Jasmine MD       EEG #:        This EEG was done on [facility] with video monitoring recording of the spell.  EEG demonstrated some  sharp wave activity bilaterally that is not rhythmic.  The patient otherwise had a spell that started with tingling and d     H/O magnetic resonance imaging of brain and brain stem 3/6/2017    2017 normal brain mri     Major depressive disorder, single episode, mild (H)     chronic     Myalgia and myositis, unspecified     episodic low back and hip pain     Ruptured ovarian cyst 2011    right, requiring laparoscopy for drainage intra-abdominal hematoma     Serotonin syndrome     SSRI related (paroxetine and venlaxine), also triggered by anesthetic agents in      Supervision of other normal pregnancy      - vaginal, pergonal for infertility       Past Surgical History   I have reviewed this patient's surgical history and updated it with pertinent information if needed.  Past Surgical History:   Procedure Laterality Date     ASPIRATION AND CURETTAGE  3/2008    Missed AB following in vitro fertilzation/implantation     GYN SURGERY      ruptured ovarian cyst     HC REPAIR OF NASAL SEPTUM  2000     LAPAROSCOPY  2011    for drainage intra-abdominal hematoma s/p ruptured right ovarian cyst       Social History   I have reviewed this patient's social history and updated it with pertinent information if needed.  Social History     Tobacco Use     Smoking status: Never Smoker     Smokeless tobacco: Never Used   Substance Use Topics     Alcohol use: Yes     Comment: 1 per month     Drug use: No       Family History   I have reviewed this patient's family history and updated it with pertinent information if needed.  Family History   Problem Relation Age of Onset     Lipids Mother      Thyroid Disease Mother         hypothyroidism     Breast Cancer Mother 61     Depression Father      Hypertension Father      C.A.D. Father          MI age 64     Diabetes Maternal Grandmother         type 2     Diabetes Maternal Grandfather         type 2     C.A.D. Maternal Grandfather         multiple MIs,  onset age 50s     Tremor Maternal Grandfather      Prostate Cancer Paternal Grandfather          age 70s     Medical History Unknown Daughter      Glaucoma No family hx of      Macular Degeneration No family hx of        Prior to Admission Medications   Prior to Admission Medications   Prescriptions Last Dose Informant Patient Reported? Taking?   IBUPROFEN PO   Yes No   Sig: Take 400-800 mg by mouth daily as needed for moderate pain or fever    buPROPion (WELLBUTRIN) 75 MG tablet   No No   Sig: TAKE 1 TABLET (75 MG) BY MOUTH 2 TIMES DAILY   melatonin 5 MG CAPS   Yes No      Facility-Administered Medications: None     Allergies   Allergies   Allergen Reactions     No Clinical Screening - See Comments      Other reaction(s): Other, see comments  PN: Don't give medications that increase serotonin level, will end up in ICU.      Codeine GI Disturbance     Codeine      Other reaction(s): Other, see comments  PN:  Nausea     Paroxetine      serotonin syndrome, all seratonin drugs     Paroxetine      PN: ho serotonin syndrome per pet     Venlafaxine      serotonin syndrome       Physical Exam   Vital Signs: Temp: 98.3  F (36.8  C) Temp src: Oral BP: 107/64 Pulse: 90   Resp: 18 SpO2: 97 % O2 Device: None (Room air)    Weight: 0 lbs 0 oz    Gen: NAD, pleasant  HEENT: Normocephalic, EOMI, MMM  Resp: no crackles,  no wheezes, no increased work of resp  CV: S1S2 heard, reg rhythm, reg rate, no pedal edema  Abdo: soft, mildly tender on palp, fairly diffusely, nonrigid, bowel sounds present; no flank pain, no CVAT  Ext: calves nontender, well perfused  Neuro: AAOx3, CN grossly intact, no facial asymmetry      Data   Data reviewed today: I reviewed all medications, new labs and imaging results over the last 24 hours. I personally reviewed no images or EKG's today.    Recent Labs   Lab 21  1637   WBC 12.8*   HGB 12.6   MCV 91         POTASSIUM 2.7*   CHLORIDE 121*   CO2 18*   BUN 7   CR 0.61   ANIONGAP 5    CHIDI 6.1*   GLC 64*   ALBUMIN 2.4*   PROTTOTAL 5.1*   BILITOTAL 0.3   ALKPHOS 31*   ALT 29   AST 18   LIPASE 459*     Recent Results (from the past 24 hour(s))   CT Abdomen Pelvis w Contrast    Narrative    CT ABDOMEN PELVIS WITH CONTRAST 8/23/2021 5:15 PM    CLINICAL HISTORY: Abdominal pain, acute, nonlocalized.    TECHNIQUE: CT scan of the abdomen and pelvis was performed following  injection of IV contrast. Multiplanar reformats were obtained. Dose  reduction techniques were used.  CONTRAST: 58 mL Isovue-370    COMPARISON: 1/9/2011    FINDINGS:   LOWER CHEST: Normal.    HEPATOBILIARY: No focal liver lesions. Unremarkable gallbladder.    PANCREAS: Normal.    SPLEEN: Normal.    ADRENAL GLANDS: Normal.    KIDNEYS/BLADDER: Normal.    BOWEL: The appendix is dilated, measuring up to 8 mm (series 3, image  134). No evidence of periappendiceal abscess or free air. No bowel  obstruction.    PELVIC ORGANS: Normal.    ADDITIONAL FINDINGS: Left renal vein is compressed between the aorta  and superior mesenteric artery with proximal dilation of the left  renal vein and dilation of the left ovarian vein associated with  pelvic varicosities. No ascites.    MUSCULOSKELETAL: No destructive bone lesions.      Impression    IMPRESSION:   1.  Dilated appendix suggestive of acute appendicitis. No evidence of  perforation or abscess.  2.  Incidental note of possible nutcracker compression of the left  renal vein with collateral flow down the left ovarian vein that could  be associated with pelvic venous congestion syndrome.    ADAIR MONTERO MD         SYSTEM ID:  [id number]

## 2021-08-23 NOTE — ANESTHESIA PREPROCEDURE EVALUATION
Anesthesia Pre-Procedure Evaluation    Patient: Dae Castañeda   MRN: [id number] : 1975        Preoperative Diagnosis: Appendicitis [K37]   Procedure : Procedure(s):  LAPAROSCOPIC APPENDECTOMY     Past Medical History:   Diagnosis Date     Abnormal involuntary movement 2017    Author: Shaheed Freeman MD Service: Hospitalist Author Type: Physician    Filed: 2015  8:10 PM Note Time: 2015 10:35 AM Status: Signed   : Shaheed Freeman MD (Physician)     Expand All Collapse All    DATE OF ADMISSION:  2015      DATE OF DISCHARGE:  2015      PRIMARY CARE PHYSICIAN:  Nancy Brady MD      CHIEF COMPLAINT:  Involuntary jerky movements.         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 Orders    EEG        Expand All Collapse All    ONE-HOUR VIDEO ELECTROENCEPHALOGRAM       ORDERING PHYSICIAN:  Lito Jasmine MD       EEG #:        This EEG was done on [facility] with video monitoring recording of the spell.  EEG demonstrated some sharp wave activity bilaterally that is not rhythmic.  The patient otherwise had a spell that started with tingling and d     H/O magnetic resonance imaging of brain and brain stem 3/6/2017    2017 normal brain mri     Major depressive disorder, single episode, mild (H)     chronic     Myalgia and myositis, unspecified     episodic low back and hip pain     Ruptured ovarian cyst 2011    right, requiring laparoscopy for drainage intra-abdominal hematoma     Serotonin syndrome     SSRI related (paroxetine and venlaxine), also triggered by anesthetic agents in      Supervision of other normal pregnancy      - vaginal, pergonal for infertility      Past Surgical History:   Procedure Laterality Date      ASPIRATION AND CURETTAGE  3/2008    Missed AB following in vitro fertilzation/implantation     HC REPAIR OF NASAL SEPTUM  4/2000     LAPAROSCOPY  1/2011    for drainage intra-abdominal hematoma s/p ruptured right ovarian cyst      Allergies   Allergen Reactions     No Clinical Screening - See Comments      Other reaction(s): Other, see comments  PN: Don't give medications that increase serotonin level, will end up in ICU.      Codeine GI Disturbance     Codeine      Other reaction(s): Other, see comments  PN:  Nausea     Paroxetine      serotonin syndrome, all seratonin drugs     Paroxetine      PN: ho serotonin syndrome per pet     Venlafaxine      serotonin syndrome      Social History     Tobacco Use     Smoking status: Never Smoker     Smokeless tobacco: Never Used   Substance Use Topics     Alcohol use: Yes     Comment: 1 per month      Wt Readings from Last 1 Encounters:   02/07/20 52.2 kg (115 lb)        Anesthesia Evaluation   Pt has had prior anesthetic. Type: General.    No history of anesthetic complications       ROS/MED HX  ENT/Pulmonary:    (-) tobacco use   Neurologic:       Cardiovascular:       METS/Exercise Tolerance:     Hematologic:       Musculoskeletal: Comment: Myalgia and myositis  (+) arthritis,     GI/Hepatic:     (+) appendicitis,     Renal/Genitourinary:       Endo:       Psychiatric/Substance Use: Comment: Hx of serotonin syndrome    (+) psychiatric history depression     Infectious Disease:       Malignancy:       Other:      (+) , H/O Chronic Pain (Fibromyalgia),           OUTSIDE LABS:  CBC:   Lab Results   Component Value Date    WBC 12.8 (H) 08/23/2021    WBC 4.7 05/15/2019    HGB 12.6 08/23/2021    HGB 13.9 05/15/2019    HCT 37.2 08/23/2021    HCT 41.2 05/15/2019     08/23/2021     05/15/2019     BMP:   Lab Results   Component Value Date     08/23/2021     05/15/2019    POTASSIUM 2.7 (L) 08/23/2021    POTASSIUM 3.8 05/15/2019    CHLORIDE 121 (H)  08/23/2021    CHLORIDE 108 05/15/2019    CO2 18 (L) 08/23/2021    CO2 29 05/15/2019    BUN 7 08/23/2021    BUN 11 05/15/2019    CR 0.61 08/23/2021    CR 0.80 05/15/2019    GLC 64 (L) 08/23/2021    GLC 93 05/15/2019     COAGS:   Lab Results   Component Value Date    INR 1.04 08/22/2008     POC:   Lab Results   Component Value Date     (H) 01/10/2011    HCGS Negative 01/23/2017     HEPATIC:   Lab Results   Component Value Date    ALBUMIN 2.4 (L) 08/23/2021    PROTTOTAL 5.1 (L) 08/23/2021    ALT 29 08/23/2021    AST 18 08/23/2021    ALKPHOS 31 (L) 08/23/2021    BILITOTAL 0.3 08/23/2021    KRISTI 27 08/23/2016     OTHER:   Lab Results   Component Value Date    LACT 1.0 03/13/2017    CHIDI 6.1 (L) 08/23/2021    PHOS 4.2 08/21/2008    MAG 1.7 08/23/2021    LIPASE 459 (H) 08/23/2021    TSH 0.75 03/13/2017    T4 0.82 07/02/2015    CRP <2.9 03/13/2017    SED 7 03/13/2017       Anesthesia Plan    ASA Status:  3      Anesthesia Type: General.     - Airway: ETT   Induction: RSI, Intravenous.   Maintenance: Balanced.        Consents    Anesthesia Plan(s) and associated risks, benefits, and realistic alternatives discussed. Questions answered and patient/representative(s) expressed understanding.     - Discussed with:  Patient         Postoperative Care    Pain management: IV analgesics, Multi-modal analgesia.   PONV prophylaxis: Ondansetron (or other 5HT-3), Dexamethasone or Solumedrol, Background Propofol Infusion     Comments:         H&P reviewed: Unable to attach H&P to encounter due to EHR limitations. H&P Update: appropriate H&P reviewed, patient examined. No interval changes since H&P (within 30 days).         Mark Caban MD

## 2021-08-23 NOTE — ED NOTES
Bed: ED17  Expected date:   Expected time:   Means of arrival:   Comments:  514 46f abd pain  eta 1449

## 2021-08-24 LAB
ALBUMIN SERPL-MCNC: 3.2 G/DL (ref 3.4–5)
ALBUMIN UR-MCNC: NEGATIVE MG/DL
ALP SERPL-CCNC: 43 U/L (ref 40–150)
ALT SERPL W P-5'-P-CCNC: 35 U/L (ref 0–50)
ANION GAP SERPL CALCULATED.3IONS-SCNC: 9 MMOL/L (ref 3–14)
APPEARANCE UR: CLEAR
AST SERPL W P-5'-P-CCNC: 20 U/L (ref 0–45)
BACTERIA #/AREA URNS HPF: ABNORMAL /HPF
BILIRUB SERPL-MCNC: 0.8 MG/DL (ref 0.2–1.3)
BILIRUB UR QL STRIP: NEGATIVE
BUN SERPL-MCNC: 6 MG/DL (ref 7–30)
CALCIUM SERPL-MCNC: 8.6 MG/DL (ref 8.5–10.1)
CHLORIDE BLD-SCNC: 109 MMOL/L (ref 94–109)
CO2 SERPL-SCNC: 19 MMOL/L (ref 20–32)
COLOR UR AUTO: YELLOW
CREAT SERPL-MCNC: 0.77 MG/DL (ref 0.52–1.04)
ERYTHROCYTE [DISTWIDTH] IN BLOOD BY AUTOMATED COUNT: 11.2 % (ref 10–15)
GFR SERPL CREATININE-BSD FRML MDRD: >90 ML/MIN/1.73M2
GLUCOSE BLD-MCNC: 95 MG/DL (ref 70–99)
GLUCOSE UR STRIP-MCNC: NEGATIVE MG/DL
HCT VFR BLD AUTO: 37.9 % (ref 35–47)
HGB BLD-MCNC: 13.1 G/DL (ref 11.7–15.7)
HGB UR QL STRIP: ABNORMAL
IGA SERPL-MCNC: 226 MG/DL (ref 84–499)
IGG SERPL-MCNC: 1089 MG/DL (ref 610–1616)
IGM SERPL-MCNC: 88 MG/DL (ref 35–242)
KETONES UR STRIP-MCNC: 60 MG/DL
LEUKOCYTE ESTERASE UR QL STRIP: NEGATIVE
LIPASE SERPL-CCNC: 449 U/L (ref 73–393)
MAGNESIUM SERPL-MCNC: 2.8 MG/DL (ref 1.6–2.3)
MAGNESIUM SERPL-MCNC: 3 MG/DL (ref 1.6–2.3)
MCH RBC QN AUTO: 30.8 PG (ref 26.5–33)
MCHC RBC AUTO-ENTMCNC: 34.6 G/DL (ref 31.5–36.5)
MCV RBC AUTO: 89 FL (ref 78–100)
MUCOUS THREADS #/AREA URNS LPF: PRESENT /LPF
NITRATE UR QL: NEGATIVE
PH UR STRIP: 5.5 [PH] (ref 5–7)
PLATELET # BLD AUTO: 246 10E3/UL (ref 150–450)
POTASSIUM BLD-SCNC: 3.6 MMOL/L (ref 3.4–5.3)
POTASSIUM BLD-SCNC: 3.8 MMOL/L (ref 3.4–5.3)
PROT SERPL-MCNC: 6.9 G/DL (ref 6.8–8.8)
RBC # BLD AUTO: 4.25 10E6/UL (ref 3.8–5.2)
RBC URINE: 2 /HPF
SODIUM SERPL-SCNC: 137 MMOL/L (ref 133–144)
SP GR UR STRIP: 1.02 (ref 1–1.03)
SQUAMOUS EPITHELIAL: <1 /HPF
UROBILINOGEN UR STRIP-MCNC: NORMAL MG/DL
WBC # BLD AUTO: 9.8 10E3/UL (ref 4–11)
WBC URINE: 2 /HPF

## 2021-08-24 PROCEDURE — 82784 ASSAY IGA/IGD/IGG/IGM EACH: CPT | Performed by: NURSE PRACTITIONER

## 2021-08-24 PROCEDURE — 84132 ASSAY OF SERUM POTASSIUM: CPT | Performed by: HOSPITALIST

## 2021-08-24 PROCEDURE — 83516 IMMUNOASSAY NONANTIBODY: CPT | Performed by: NURSE PRACTITIONER

## 2021-08-24 PROCEDURE — 36415 COLL VENOUS BLD VENIPUNCTURE: CPT | Performed by: HOSPITALIST

## 2021-08-24 PROCEDURE — 83690 ASSAY OF LIPASE: CPT | Performed by: HOSPITALIST

## 2021-08-24 PROCEDURE — 250N000013 HC RX MED GY IP 250 OP 250 PS 637: Performed by: PHYSICIAN ASSISTANT

## 2021-08-24 PROCEDURE — 80053 COMPREHEN METABOLIC PANEL: CPT | Performed by: HOSPITALIST

## 2021-08-24 PROCEDURE — 83735 ASSAY OF MAGNESIUM: CPT | Performed by: HOSPITALIST

## 2021-08-24 PROCEDURE — 250N000011 HC RX IP 250 OP 636: Performed by: HOSPITALIST

## 2021-08-24 PROCEDURE — 99232 SBSQ HOSP IP/OBS MODERATE 35: CPT | Performed by: HOSPITALIST

## 2021-08-24 PROCEDURE — G0378 HOSPITAL OBSERVATION PER HR: HCPCS

## 2021-08-24 PROCEDURE — 85027 COMPLETE CBC AUTOMATED: CPT | Performed by: HOSPITALIST

## 2021-08-24 PROCEDURE — 258N000003 HC RX IP 258 OP 636: Performed by: HOSPITALIST

## 2021-08-24 PROCEDURE — 250N000013 HC RX MED GY IP 250 OP 250 PS 637: Performed by: HOSPITALIST

## 2021-08-24 PROCEDURE — 96376 TX/PRO/DX INJ SAME DRUG ADON: CPT

## 2021-08-24 PROCEDURE — 81003 URINALYSIS AUTO W/O SCOPE: CPT | Performed by: HOSPITALIST

## 2021-08-24 RX ORDER — SIMETHICONE 80 MG
80 TABLET,CHEWABLE ORAL EVERY 6 HOURS PRN
Status: DISCONTINUED | OUTPATIENT
Start: 2021-08-24 | End: 2021-08-26 | Stop reason: HOSPADM

## 2021-08-24 RX ORDER — OXYCODONE HYDROCHLORIDE 5 MG/1
5 TABLET ORAL
Status: DISCONTINUED | OUTPATIENT
Start: 2021-08-24 | End: 2021-08-26 | Stop reason: HOSPADM

## 2021-08-24 RX ORDER — CYCLOBENZAPRINE HCL 10 MG
10 TABLET ORAL EVERY 8 HOURS PRN
Status: DISCONTINUED | OUTPATIENT
Start: 2021-08-24 | End: 2021-08-26 | Stop reason: HOSPADM

## 2021-08-24 RX ORDER — DROSPIRENONE AND ETHINYL ESTRADIOL 0.02-3(28)
1 KIT ORAL DAILY
COMMUNITY
End: 2024-05-07

## 2021-08-24 RX ADMIN — ACETAMINOPHEN 650 MG: 325 TABLET, FILM COATED ORAL at 09:13

## 2021-08-24 RX ADMIN — OXYCODONE HYDROCHLORIDE 5 MG: 5 TABLET ORAL at 19:01

## 2021-08-24 RX ADMIN — CYCLOBENZAPRINE 10 MG: 10 TABLET, FILM COATED ORAL at 19:43

## 2021-08-24 RX ADMIN — HYDROMORPHONE HYDROCHLORIDE 0.2 MG: 0.2 INJECTION, SOLUTION INTRAMUSCULAR; INTRAVENOUS; SUBCUTANEOUS at 14:24

## 2021-08-24 RX ADMIN — ACETAMINOPHEN 650 MG: 325 TABLET, FILM COATED ORAL at 15:40

## 2021-08-24 RX ADMIN — OXYCODONE HYDROCHLORIDE 5 MG: 5 TABLET ORAL at 05:50

## 2021-08-24 RX ADMIN — HYDROMORPHONE HYDROCHLORIDE 0.2 MG: 0.2 INJECTION, SOLUTION INTRAMUSCULAR; INTRAVENOUS; SUBCUTANEOUS at 04:04

## 2021-08-24 RX ADMIN — HYDROMORPHONE HYDROCHLORIDE 0.2 MG: 0.2 INJECTION, SOLUTION INTRAMUSCULAR; INTRAVENOUS; SUBCUTANEOUS at 08:12

## 2021-08-24 RX ADMIN — HYDROMORPHONE HYDROCHLORIDE 0.2 MG: 0.2 INJECTION, SOLUTION INTRAMUSCULAR; INTRAVENOUS; SUBCUTANEOUS at 17:35

## 2021-08-24 RX ADMIN — DEXTROSE AND SODIUM CHLORIDE: 5; 900 INJECTION, SOLUTION INTRAVENOUS at 19:55

## 2021-08-24 RX ADMIN — ONDANSETRON 4 MG: 2 INJECTION INTRAMUSCULAR; INTRAVENOUS at 16:38

## 2021-08-24 RX ADMIN — DEXTROSE AND SODIUM CHLORIDE: 5; 900 INJECTION, SOLUTION INTRAVENOUS at 09:33

## 2021-08-24 RX ADMIN — HYDROMORPHONE HYDROCHLORIDE 0.2 MG: 0.2 INJECTION, SOLUTION INTRAMUSCULAR; INTRAVENOUS; SUBCUTANEOUS at 20:37

## 2021-08-24 RX ADMIN — HYDROMORPHONE HYDROCHLORIDE 0.2 MG: 0.2 INJECTION, SOLUTION INTRAMUSCULAR; INTRAVENOUS; SUBCUTANEOUS at 11:21

## 2021-08-24 RX ADMIN — OXYCODONE HYDROCHLORIDE 5 MG: 5 TABLET ORAL at 23:59

## 2021-08-24 RX ADMIN — HYDROMORPHONE HYDROCHLORIDE 0.2 MG: 0.2 INJECTION, SOLUTION INTRAMUSCULAR; INTRAVENOUS; SUBCUTANEOUS at 00:26

## 2021-08-24 RX ADMIN — OXYCODONE HYDROCHLORIDE 5 MG: 5 TABLET ORAL at 12:27

## 2021-08-24 RX ADMIN — OXYCODONE HYDROCHLORIDE 5 MG: 5 TABLET ORAL at 09:13

## 2021-08-24 RX ADMIN — OXYCODONE HYDROCHLORIDE 5 MG: 5 TABLET ORAL at 15:40

## 2021-08-24 ASSESSMENT — ACTIVITIES OF DAILY LIVING (ADL)
ADLS_ACUITY_SCORE: 10
ADLS_ACUITY_SCORE: 10
ADLS_ACUITY_SCORE: 12

## 2021-08-24 ASSESSMENT — MIFFLIN-ST. JEOR: SCORE: 1133.49

## 2021-08-24 NOTE — ANESTHESIA POSTPROCEDURE EVALUATION
Patient: Dae Castañeda    Procedure(s):  LAPAROSCOPIC APPENDECTOMY    Diagnosis:Appendicitis [K37]  Diagnosis Additional Information: No value filed.    Anesthesia Type:  General    Note:     Postop Pain Control: Uneventful            Sign Out: Well controlled pain   PONV: No   Neuro/Psych: Uneventful            Sign Out: Acceptable/Baseline neuro status   Airway/Respiratory: Uneventful            Sign Out: Acceptable/Baseline resp. status   CV/Hemodynamics: Uneventful            Sign Out: Acceptable CV status; No obvious hypovolemia; No obvious fluid overload   Other NRE: NONE   DID A NON-ROUTINE EVENT OCCUR? No           Last vitals:  Vitals Value Taken Time   /65 08/23/21 2220   Temp 36.1  C (97  F) 08/23/21 2100   Pulse 80 08/23/21 2228   Resp 21 08/23/21 2228   SpO2 100 % 08/23/21 2228   Vitals shown include unvalidated device data.    Electronically Signed By: Mark Caban MD  August 23, 2021  10:30 PM

## 2021-08-24 NOTE — PROGRESS NOTES
I was consulted for this patient as the on call GYN for unassigned patients. Upon review of the patient's chart, it is noted that she is well established with Dr. Garrison from [facility]. I notified the [facility] to contact Dr. Garrison for further gyn evaluation. Please reach out if I can be of further assistance.    Paris Ramirez MD

## 2021-08-24 NOTE — CONSULTS
"GASTROENTEROLOGY CONSULTATION    Dae Castañeda  [address] [facility] [address]  46 year old female    Admission Date/Time: 8/23/2021  Primary Care Provider: Brigitte Bryan    We were asked to see the patient in consultation by Dr. Elliott for evaluation of reported gastric mass on recent EGD.     HPI: Dae Castañeda is a 46 year old female with PMH including ruptured ovarian cyst, depression, and fibromyalgia who presented to the ED with severe pain after completing an outpatient colonoscopy 8/23/21, found to have acute appendicitis.     Her initial consultation with our group 8/19/2021 was for the evaluation of a 2-month history of increased stomachaches and episodes of diarrhea, which could last for a day. She has not been able to eat breakfast before 10AM or she will develop cramping with diarrhea. Symptoms worsened over last 2 weeks with diffuse abdominal pain, decreased appetite, 8 pound weight loss.   -August 8th, developed \"raw\" abdominal pain that felt like \"finger nail scratches\" and couldn't eat. A few days later, tried a few saltines and small bites of food. Would have loose, acidic stools for a week.   -August 16th, the raw feeling went away, entire abdomen became distended, diffuse abdominal pain, stools were regular/formed.   -Progressive symptoms: unable to work after August 18th. Any activity that would cause her to use abdominal muscles caused pain, even lifting water bottle.     Previous work-up 8/13/21 [facility] showed unremarkable CBC, CRP, CMP, lipase. Enteric pathogens negative. I do not see test for C. Difficile. Abdominal CT scan showed a possible uterine fibroids and prominent periuterine vessels, but nothing to explain her symptoms.    EGD 8/20/2021 showed a 15 to 20 mm mass in the duodenum, which appears to correspond to the location of the ampulla.  The remainder of the exam was normal.  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  Planning outpatient EUS/resection.     Colonoscopy 8/13/2021 showed concern for appendiceal fecalith with surrounding edema.  No air was used during exam, colonoscopy was completed with water infusion only due to abdominal pain.     She was sent via ambulance to the [facility] after the procedure. Labs on admission showed mild leukocytosis with WBC 12.8. Hgb 12.6. Potassium 2.7, normal kidney function, albumin 2.4, normal LFTs, lipase 459. COVID 19 negative. CT showed dilated appendix suggestive of acute appendicitis without perforation or abscess, incidental note of possible nutcracker  congestion of left renal vein with collateral flow down the left ovarian vein that could be associated with pelvic venous congestion syndrome. She underwent a laparoscopic appendectomy with Dr. Contreras 8/23. Her pain remains the same. If she is absolutely still, pain can get down to 1/10. If she ambulates to the bathroom, is talking, touching abdomen it goes up to 6-9/10. She is tearful, lying in bed. She has dilaudid 0.2mg every 3 hours prn and oxycodone available.     Family history positive for diverticulitis.     ROS: A comprehensive ten point review of systems was negative aside from those in mentioned in the HPI.      MEDICATIONS: No current facility-administered medications on file prior to encounter.  buPROPion (WELLBUTRIN) 75 MG tablet, TAKE 1 TABLET (75 MG) BY MOUTH 2 TIMES DAILY (Patient taking differently: Take 75 mg by mouth daily )  drospirenone-ethinyl estradiol (ELIS) 3-0.02 MG tablet, Take 1 tablet by mouth daily        ALLERGIES:   Allergies   Allergen Reactions     No Clinical Screening - See Comments      Other reaction(s): Other, see comments  PN: Don't give medications that increase serotonin level, will end up in ICU.      Codeine GI Disturbance     Codeine      Other reaction(s): Other, see comments  PN:  Nausea     Paroxetine      serotonin syndrome, all seratonin drugs     Paroxetine      PN:  ho serotonin syndrome per pet     Venlafaxine      serotonin syndrome       Past Medical History:   Diagnosis Date     Abnormal involuntary movement 2017    Author: Shaheed Freeman MD Service: Hospitalist Author Type: Physician    Filed: 2015  8:10 PM Note Time: 2015 10:35 AM Status: Signed   : Shaheed Freeman MD (Physician)     Expand All Collapse All    DATE OF ADMISSION:  2015      DATE OF DISCHARGE:  2015      PRIMARY CARE PHYSICIAN:  Nancy Brady MD      CHIEF COMPLAINT:  Involuntary jerky movements.         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 Orders    EEG        Expand All Collapse All    ONE-HOUR VIDEO ELECTROENCEPHALOGRAM       ORDERING PHYSICIAN:  Lito Jasmine MD       EEG #:        This EEG was done on [facility] with video monitoring recording of the spell.  EEG demonstrated some sharp wave activity bilaterally that is not rhythmic.  The patient otherwise had a spell that started with tingling and d     H/O magnetic resonance imaging of brain and brain stem 3/6/2017    2017 normal brain mri     Major depressive disorder, single episode, mild (H)     chronic     Myalgia and myositis, unspecified     episodic low back and hip pain     Ruptured ovarian cyst 2011    right, requiring laparoscopy for drainage intra-abdominal hematoma     Serotonin syndrome     SSRI related (paroxetine and venlaxine), also triggered by anesthetic agents in      Supervision of other normal pregnancy      - vaginal, pergonal for infertility       Past Surgical History:   Procedure Laterality Date     ASPIRATION AND CURETTAGE  3/2008    Missed AB following in vitro fertilzation/implantation     GYN SURGERY      ruptured ovarian cyst      HC REPAIR OF NASAL SEPTUM  2000     LAPAROSCOPY  2011    for drainage intra-abdominal hematoma s/p ruptured right ovarian cyst       SOCIAL HISTORY:  Social History     Tobacco Use     Smoking status: Never Smoker     Smokeless tobacco: Never Used   Substance Use Topics     Alcohol use: Yes     Comment: 1 per month     Drug use: No       FAMILY HISTORY:  Family History   Problem Relation Age of Onset     Lipids Mother      Thyroid Disease Mother         hypothyroidism     Breast Cancer Mother 61     Depression Father      Hypertension Father      C.A.D. Father          MI age 64     Diabetes Maternal Grandmother         type 2     Diabetes Maternal Grandfather         type 2     C.A.D. Maternal Grandfather         multiple MIs, onset age 50s     Tremor Maternal Grandfather      Prostate Cancer Paternal Grandfather          age 70s     Medical History Unknown Daughter      Glaucoma No family hx of      Macular Degeneration No family hx of        PHYSICAL EXAM:   BP 92/50   Pulse 82   Temp 98.1  F (36.7  C) (Oral)   Resp 18   SpO2 97%    Constitutional: alert, no acute distress  Cardiovascular: regular rate and rhythm  Respiratory: clear to auscultation bilaterally  Psychiatric: normal pleasant affect  Head: atraumatic, normocephalic  Neck: supple, no thyromegaly  ENT: mucous membranes are moist  Abdomen: soft, generalized tenderness with very light touch, + active bowel sounds   Neuro: Neurologically intact grossly  Skin: warm, dry, no rashes are noted    LABORATORY WORK  Recent Labs   Lab Test 21  1637 05/15/19  1054 18  1056   WBC 12.8* 4.7 6.7   HGB 12.6 13.9 13.5   MCV 91 91 90    220 218     Recent Labs   Lab Test 21  0015 21  1637 05/15/19  1054 18  1056   NA  --  144 138 141   POTASSIUM 3.6 2.7* 3.8 3.7   CHLORIDE  --  121* 108 109   CO2  --  18* 29 25   BUN  --  7 11 13   CR  --  0.61 0.80 0.79   ANIONGAP  --  5 1* 7   CHIDI  --  6.1* 8.6 8.1*     Recent  Labs   Lab Test 08/23/21  1637 01/04/18  1115 01/23/17  1800 01/23/17  1048 05/12/15  1500 05/12/15  1220   ALBUMIN 2.4*  --   --  3.9  --  3.9   BILITOTAL 0.3  --   --  0.3  --  0.4   ALT 29  --   --  19  --  20   AST 18  --   --  18  --  16   ALKPHOS 31*  --   --  63  --  67   PROTEIN  --  Negative Negative  --  Negative  --    LIPASE 459*  --   --   --   --   --        IMAGING   CT 8/23/21  IMPRESSION:   1.  Dilated appendix suggestive of acute appendicitis. No evidence of  perforation or abscess.  2.  Incidental note of possible nutcracker compression of the left  renal vein with collateral flow down the left ovarian vein that could  be associated with pelvic venous congestion syndrome.    CONSULTATION ASSESSMENT AND PLAN  46 year old female with PMH including ruptured ovarian cyst, depression, and fibromyalgia who presented to the ED after completing outpatient colonoscopy 8/23/21, found to have acute appendicitis. She continues to have severe, diffuse abdominal pain with touch and movement. 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 This will need EUS with resection at some point. This should not cause severe pain unless it is causing bile duct obstruction/pancreatitis, and is typically done outpatient. Discussed with Dr. Vasquez who recommends checking liver function tests and lipase. There was no evidence of pancreatitis/biliary obstruction on CT scan from yesterday.    Plan  -Check LFTs/lipase today   -EUS/resection of duodenal lesion - likely outpatient   -Pain control per hospitalist    -Infectious workup if she has diarrhea: c. Difficile, enteric pathogens, giardia/crypto      I will discuss the patient plan with Dr. Barksdale, who will also see the patient. Thank you for asking us to participate in the care of this patient.    Rose Mary Guzman, CNP  [facility]  Cell: [phone] until 12PM. Call Dr. Barksdale after 12PM.   Office: [phone]

## 2021-08-24 NOTE — SIGNIFICANT EVENT
Significant Event Note  D/w Dr. Barksdale from [facility], and greatly appreciate his input.  Continues to have pain localizes to the pelvis and does not seem significantly improved after appenditicitis  Not clear if related to appendicitis or GI entity  CT imaging raises concern for pelvic congestion syndrome? Will ask for gyn input.    Henry Still, DO

## 2021-08-24 NOTE — PROGRESS NOTES
Pt A/Ox4. Pain controlled with 0.2 dilaudid x2 IV and PO oxy. NPO except sips and meds. VSS. Soft BP is baseline. Ambulated x2 to bathroom SBA and GB. RA.   Slight drainage from lap sites.

## 2021-08-24 NOTE — PROGRESS NOTES
Pt admit to floor. A/O x4. Pain controlled at this time. Denies SOB, N/V. Calm and comfortable. VSS. 3 lap sites with slight drainage. 2L 02. All questions answered.

## 2021-08-24 NOTE — ANESTHESIA PROCEDURE NOTES
Airway       Patient location during procedure: OR       Procedure Start/Stop Times: 8/23/2021 8:13 PM  Staff -        CRNA: Cristian Caceres APRN CRNA       Performed By: CRNA  Consent for Airway        Urgency: elective  Indications and Patient Condition       Indications for airway management: tigre-procedural       Induction type:RSI       Mask difficulty assessment: 0 - not attempted    Final Airway Details       Final airway type: endotracheal airway       Successful airway: ETT - single  Endotracheal Airway Details        ETT size (mm): 7.0       Cuffed: yes       Successful intubation technique: video laryngoscopy       VL Blade Size: Glidescope 3       Grade View of Cords: 1       Adjucts: stylet       Position: Right       Measured from: lips       Secured at (cm): 22       Bite block used: None    Post intubation assessment        Placement verified by: capnometry, equal breath sounds and chest rise        Number of attempts at approach: 1       Number of other approaches attempted: 0       Secured with: pink tape       Ease of procedure: easy       Dentition: Intact and Unchanged

## 2021-08-24 NOTE — PROGRESS NOTES
General Surgery Progress Note    Admission Date: 8/23/2021  Today's Date: 8/24/2021         Assessment:      Dae Castañeda is a 46 year old female   S/P lap appendectomy  POD #1     Diagnosis: acute appendicitis   Additionally, concern for 2+ wks of postprandial pain and ? Gastric mass on recent EGD         Plan:   From surgical perspective, can discharge when tolerating liquids and pain controlled.  Uncertain if any further GI workup required while inpatient, or could follow up at outpatient    Pain: ok for Oxycodone q 3 hours.  Pain Right of midline and thought to be less incisional per patient.  Activity: try to ambulate 3-4 times daily.  Up to chair, change position frequently.  Encouraged IS use.  Bowel: await return of bowel function   Antibiotics: received Zosyn.  No further plan for abx.  Diet: start clear liquid diet.  ADAT if/when ok with GI/Medicine  IVFs: continue at 100mL//hr.  Can wean/SL when tolerating PO.  DVT prophylaxis: PCD's.  Consider Lovenox if prolonged stay intended.  Dispo: Per Medicine.  Ok to discharge to home from surgical perspective.  Follow up with phone call in ~ 2 weeks to check in on recovery.        Interval History:   Still having rt sided pain.  Using oxycodone and Dilaudid IV.  No nausea and would like to start clears at least.  Up to use bathroom but otherwise not moving much.          Physical Exam:   BP 92/50   Pulse 82   Temp 98.1  F (36.7  C) (Oral)   Resp 16   SpO2 97%   I/O last 3 completed shifts:  In: 620 [P.O.:120; I.V.:500]  Out: 10 [Blood:10]  General: NAD, pleasant, alert and oriented x3  Abdomen: soft, tender, non distended.  Incision: mild drainage but controlled now      LABS:  Lab Results   Component Value Date    WBC 9.8 08/24/2021    HGB 13.1 08/24/2021    HCT 37.9 08/24/2021     08/24/2021     08/24/2021    POTASSIUM 3.8 08/24/2021    CHLORIDE 109 08/24/2021    CO2 19 (L) 08/24/2021    BUN 6 (L) 08/24/2021    CR 0.77 08/24/2021     GLC 95 08/24/2021    SED 7 03/13/2017    DD <0.3 05/15/2019    TROPI <0.015 05/15/2019    AST 20 08/24/2021    ALT 35 08/24/2021    ALKPHOS 43 08/24/2021    BILITOTAL 0.8 08/24/2021    KRISTI 27 08/23/2016    INR 1.04 08/22/2008           Torito Duron PA-C  Pager: [phone]  [facility]: [phone]

## 2021-08-24 NOTE — UTILIZATION REVIEW
"  Admission Status; Secondary Review Determination         Under the authority of the Utilization Management Committee, the utilization review process indicated a secondary review on the above patient.  The review outcome is based on review of the medical records, discussions with staff, and applying clinical experience noted on the date of the review.          (x) Observation Status Appropriate - This patient does not meet hospital inpatient criteria and is placed in observation status. If this patient's primary payer is Medicare and was admitted as an inpatient, Condition Code 44 should be used and patient status changed to \"observation\".     RATIONALE FOR DETERMINATION   46 year old female with history of ruptured ovarian cyst, depression, and fibromyalgia who presented to the ED with ~2 weeks of abdominal pain.  She has been found to have acute appendicitis, however this is not felt to be the cause of her ongoing abdominal pain.  Patient underwent laparoscopic appendectomy without complication, she also has a reported gastric mass on recent EGD however no clear indication at this time for inpatient prolonged stay or work-up.  The severity of illness, intensity of service provided, expected LOS and risk for adverse outcome make the care appropriate for further observation; however, doesn't meet criteria for hospital inpatient admission.  Dr. Still notified of this determination.    This document was produced using voice recognition software.      The information on this document is developed by the utilization review team in order for the business office to ensure compliance.  This only denotes the appropriateness of proper admission status and does not reflect the quality of care rendered.         The definitions of Inpatient Status and Observation Status used in making the determination above are those provided in the CMS Coverage Manual, Chapter 1 and Chapter 6, section 70.4.      Sincerely,     RHINA SOUZA" MD BRENT    System Medical Director  Utilization Management  [facility].

## 2021-08-24 NOTE — ANESTHESIA CARE TRANSFER NOTE
Patient: Dae Castañeda    Procedure(s):  LAPAROSCOPIC APPENDECTOMY    Diagnosis: Appendicitis [K37]  Diagnosis Additional Information: No value filed.    Anesthesia Type:   General     Note:    Oropharynx: oropharynx clear of all foreign objects and spontaneously breathing  Level of Consciousness: awake  Oxygen Supplementation: nasal cannula    Independent Airway: airway patency satisfactory and stable  Dentition: dentition unchanged  Vital Signs Stable: post-procedure vital signs reviewed and stable  Report to RN Given: handoff report given  Patient transferred to: PACU  Comments: Patient breathing spontaneously.  Follows commands.  Suctioned and extubated.  Exchanging air well.  Transferred to PACU with 3L O2 via NC.  Monitors on.  VSS.  Patent IV.  Report and transfer of care to RN.    Handoff Report: Identifed the Patient, Identified the Reponsible Provider, Reviewed the pertinent medical history, Discussed the surgical course, Reviewed Intra-OP anesthesia mangement and issues during anesthesia, Set expectations for post-procedure period and Allowed opportunity for questions and acknowledgement of understanding      Vitals:  Vitals Value Taken Time   /55 08/23/21 2120   Temp 36.1  C (97  F) 08/23/21 2100   Pulse 89 08/23/21 2123   Resp 33 08/23/21 2123   SpO2 100 % 08/23/21 2123   Vitals shown include unvalidated device data.    Electronically Signed By: YOLANDA Benites CRNA  August 23, 2021  9:25 PM

## 2021-08-24 NOTE — PROGRESS NOTES
[facility]    Medicine Progress Note - Hospitalist Service       Date of Admission:  8/23/2021    Assessment & Plan           Acute appendicitis  Abdominal pain - 2+ weeks  2+ weeks of postprandial pain that has progressed.  Discussed with General Surgery - we are not convinced that the appendicitis is the culprit for the 2 weeks of abdominal pain. Leukocytosis noted.  Lipase elevated - unclear correlation/causation - CT showed normal pancreas. Differentials lengthy - including ovarian cysts, endometriosis, IBS, etc.   S/p laparoscopic appy on 8/23 but still with significant pain  Will need to monitor her pain as she recovers from her surgery, but seems out of proportion to the appendicitis that has been removed  Imaging revealed pelvic congestion?  UPT negative  Lab workup was largely unremarkable  plan  - General surgery and following  - continue clears with postoperative pain control  - [facility] consulted. Could her pain be related to pelvic congestive syndrome? Consider gyn consult, will discuss with GI. Hard to gauge how much pain is postoperative in this setting  - prn analgesia and antiemetics  - encouraged ambulation  - obtain UA    Duodenal adenoma  Seen on EGD  Plan  - GI planning on EUS as an outpatient, but not acutely  - appreciate their consult     Hypokalemia  Hyperchloremia  Non anion gap metabolic acidosis  Likely in relation to GI losses with diarrhea and decreased PO intake.   - Follow-up on the results       Diet: Clear Liquid Diet    DVT Prophylaxis: Pneumatic Compression Devices  Sal Catheter: Not present  Central Lines: None  Code Status: Full Code      Disposition Plan   Expected discharge: 08/25/2021   recommended to prior living arrangement once pain improved.     The patient's care was discussed with the Patient.    Henry Still DO  Hospitalist Service  [facility]  Securely message with the Vocera Web Console (learn more  here)  Text page via [facility] Paging/Directory      Clinically Significant Risk Factors Present on Admission                   ______________________________________________________________________    Interval History   Seen after surgery. Still with pain that seemed unchanged after surgery. jim diffuse, now with addition of incisional pain.     Data reviewed today: I reviewed all medications, new labs and imaging results over the last 24 hours. I personally reviewed no images or EKG's today.    Physical Exam   Vital Signs: Temp: 98.2  F (36.8  C) Temp src: Oral BP: 98/57 Pulse: 74   Resp: 16 SpO2: 99 % O2 Device: None (Room air) Oxygen Delivery: 2 LPM  Weight: 111 lbs 0 oz  Gen: NAD, pleasant  HEENT: Normocephalic, EOMI, MMM  Resp: no crackles,  no wheezes, no increased work of resp  CV: S1S2 heard, reg rhythm, reg rate, no pedal edema  Abdo: soft, mildly tender on palp, fairly diffusely, nonrigid, bowel sounds present; no flank pain, no CVAT  Ext: calves nontender, well perfused  Neuro: AAOx3, CN grossly intact, no facial asymmetry    Data   Recent Labs   Lab 08/24/21  0937 08/24/21  0015 08/23/21  1637   WBC 9.8  --  12.8*   HGB 13.1  --  12.6   MCV 89  --  91     --  229     --  144   POTASSIUM 3.8 3.6 2.7*   CHLORIDE 109  --  121*   CO2 19*  --  18*   BUN 6*  --  7   CR 0.77  --  0.61   ANIONGAP 9  --  5   CHIDI 8.6  --  6.1*   GLC 95  --  64*   ALBUMIN 3.2*  --  2.4*   PROTTOTAL 6.9  --  5.1*   BILITOTAL 0.8  --  0.3   ALKPHOS 43  --  31*   ALT 35  --  29   AST 20  --  18   LIPASE 449*  --  459*

## 2021-08-24 NOTE — PHARMACY-ADMISSION MEDICATION HISTORY
Pharmacy Medication History  Admission medication history interview status for the 8/23/2021  admission is complete. See EPIC admission navigator for prior to admission medications     Location of Interview: Patient room  Medication history sources: Patient    Significant changes made to the medication list:      In the past week, patient estimated taking medication this percent of the time: greater than 90%    Additional medication history information:       Medication reconciliation completed by provider prior to medication history? No    Time spent in this activity: 10 minutes    Prior to Admission medications    Medication Sig Last Dose Taking? Auth Provider   buPROPion (WELLBUTRIN) 75 MG tablet TAKE 1 TABLET (75 MG) BY MOUTH 2 TIMES DAILY  Patient taking differently: Take 75 mg by mouth daily  8/23/2021 at AM Yes Pepe Issa MD   drospirenone-ethinyl estradiol (ELIS) 3-0.02 MG tablet Take 1 tablet by mouth daily 8/23/2021 at Unknown time Yes Unknown, Entered By History       The information provided in this note is only as accurate as the sources available at the time of update(s)

## 2021-08-24 NOTE — OP NOTE
General Surgery Operative Note    PREOPERATIVE DIAGNOSIS:  Appendicitis [K37]    POSTOPERATIVE DIAGNOSIS:  Same    PROCEDURE:   Procedure(s):  LAPAROSCOPIC APPENDECTOMY    ANESTHESIA:  General.    PREOPERATIVE MEDICATIONS:  Ancef IV.    SURGEON:  Nazario Contreras MD    ASSISTANT:  NONE    INDICATIONS:  Appendicitis    PROCEDURE:  Patient was taken to the operating suite and uneventfully endotracheally intubated.  Abdomen was prepped and draped in a sterile fashion.  Surgeon initiated timeout was acknowledged.  A small skin incision was made left upper quadrant.  We entered the abdomen using the Visiport technique.  Two other trocars were placed in the usual positions under laparoscopic visualization.  Using 2 long graspers, we were able to identify the cecum and the appendix was identified near the ileocecal valve.  The appendix was inflamed and hyperemic but not ruptured.   We were able to grasp the appendix and elevate it up toward the anterior abdominal wall.  Using a combination of sharp and blunt dissection, we were able to create a window between the appendix and the mesoappendix near its base.  We then fired a 45 mm blue load Endo-DAMON stapler across the appendix at its base.  This appeared to be intact.  Following this, we fired a 45 mm white load across the mesoappendix, freeing the appendix from the cecum.  Appendix was placed in an Endocatch bag and removed from the abdomen.  We again inspected our staple lines and these were all found to be intact and hemostatic.  There was no evidence of endometriosis, ovarian cyst, or SBO.  I removed the umbilical port trocar and reapproximated the fascia with a figure-of-eight 0 Vicryl suture using the Jorge-Anahi device.  We then desufflated the abdomen using the Gypsum suction  and the trocars were removed.  The skin edges were reapproximated using 4-0 Vicryl and Steri-Strips.  Band-Aids were placed and the patient was awakened.  The patient was  uneventfully extubated and taken to PACU in stable condition.  At the conclusion of the case, all lap and needle counts were correct.      ESTIMATED BLOOD LOSS:  10 mL    INTRAOPERATIVE FINDINGS:  Acute appendicitis    Nazario Contreras MD, MD

## 2021-08-24 NOTE — CONSULTS
"[facility]  [facility] - H&P     Dae Castañeda MRN# [id number]   Age: 46 year old YOB: 1975     HPI:  Dae Castañeda is a 46 year old female with history of ruptured ovarian cyst who presents via EMS with abdominal pain and nausea. The patient reports that she underwent a colonoscopy earlier today at [facility] as advised by her OB/GYN after experiencing diffuse, \"raw\" abdominal pain over the past month. Approximately 20 minutes after her colonoscopy, she reports that she began experiencing the same diffuse abdominal pain with associated nausea. She reports that her abdominal pain and nausea persisted, prompting her to alert EMS. En route, EMS reports that the patient was given 50 mcg fentanyl with mild improvement of her symptoms. Currently, she reports that her pain has returned and that it is worsened with any exertion or use of her abdominal muscles. Over the past month, she also notes a decreased appetite and weight loss of seven pounds. She denies any chest pain, shortness of breath, vomiting, diarrhea, fever, chills, or any other symptoms. Of note, she states that she also underwent an endoscopy that was concerning for a suspected duodenal ulcer.  Upon presentation to the ED, she was found to have an elevated WBC and CT suggested appendicitis.    History is obtained from the patient    Review Of Systems:  Respiratory: No shortness of breath, dyspnea on exertion, cough, or hemoptysis  Cardiovascular: negative  Gastrointestinal: as above  Genitourinary: negative    PMH:  Past Medical History:   Diagnosis Date     Abnormal involuntary movement 1/31/2017    Author: Shaheed Freeman MD Service: Hospitalist Author Type: Physician    Filed: 5/14/2015  8:10 PM Note Time: 5/14/2015 10:35 AM Status: Signed   : Shaheed Freeman MD (Physician)     Expand All Collapse All    DATE OF ADMISSION:  05/12/2015      DATE OF DISCHARGE:  " 2015      PRIMARY CARE PHYSICIAN:  Nancy Brady MD      CHIEF COMPLAINT:  Involuntary jerky movements.         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 Orders    EEG        Expand All Collapse All    ONE-HOUR VIDEO ELECTROENCEPHALOGRAM       ORDERING PHYSICIAN:  Lito Jasmine MD       EEG #:        This EEG was done on [facility] with video monitoring recording of the spell.  EEG demonstrated some sharp wave activity bilaterally that is not rhythmic.  The patient otherwise had a spell that started with tingling and d     H/O magnetic resonance imaging of brain and brain stem 3/6/2017    2017 normal brain mri     Major depressive disorder, single episode, mild (H)     chronic     Myalgia and myositis, unspecified     episodic low back and hip pain     Ruptured ovarian cyst 2011    right, requiring laparoscopy for drainage intra-abdominal hematoma     Serotonin syndrome     SSRI related (paroxetine and venlaxine), also triggered by anesthetic agents in      Supervision of other normal pregnancy      - vaginal, pergonal for infertility       PSH:  Past Surgical History:   Procedure Laterality Date     ASPIRATION AND CURETTAGE  3/2008    Missed AB following in vitro fertilzation/implantation     HC REPAIR OF NASAL SEPTUM  2000     LAPAROSCOPY  2011    for drainage intra-abdominal hematoma s/p ruptured right ovarian cyst       Allergies:  Allergies   Allergen Reactions     No Clinical Screening - See Comments      Other reaction(s): Other, see comments  PN: Don't give medications that increase serotonin level, will end up in ICU.      Codeine GI Disturbance     Codeine      Other reaction(s): Other, see comments  PN:  Nausea     Paroxetine      serotonin  syndrome, all seratonin drugs     Paroxetine      PN: ho serotonin syndrome per pet     Venlafaxine      serotonin syndrome       Home Medications:  No current outpatient medications on file.       Social History:  Social History     Tobacco Use     Smoking status: Never Smoker     Smokeless tobacco: Never Used   Substance Use Topics     Alcohol use: Yes     Comment: 1 per month     Drug use: No       Family History:  No family history chronic diarrhea, inflammatory bowel disease or colon cancer.    Objective:  /64   Pulse 90   Temp 98.3  F (36.8  C) (Oral)   Resp 18   SpO2 97%     General appearance: healthy, alert and mild distress  Skin:  Warm, dry  Neck: normal, supple and no adenopathy  Lungs: normal and clear to auscultation  Heart: regular rate and rhythm and no murmurs, clicks, or gallops  Abdomen: flat, normal bowel sounds.   Tenderness: present: RLQ and LLQ moderate  Masses: none  Organomegaly: none    Labs Reviewed:  Recent Labs     08/23/21  1637   HGB 12.6   WBC 12.8*       Radiology:  CT abdomen reveals a dilated, thick-walled appendix with surrounding fat stranding.  Also shows appendicolith.  All imaging studies reviewed by me.    ASSESSMENT/PLAN:  The patient's history, physical exam, laboratory and imaging studies are suspicious for acute appendicitis.  I have offered the patient laparoscopic appendectomy.  The risks, benefits, and alternatives have been discussed in detail.  All of the patient's questions have been answered.  They elect to proceed and we will go to the OR at the soonest availability.  Pre-operative antibiotics have been ordered.     Nazario Contreras MD, MD

## 2021-08-25 LAB
ALBUMIN SERPL-MCNC: 2.7 G/DL (ref 3.4–5)
ALP SERPL-CCNC: 33 U/L (ref 40–150)
ALT SERPL W P-5'-P-CCNC: 27 U/L (ref 0–50)
ANION GAP SERPL CALCULATED.3IONS-SCNC: 2 MMOL/L (ref 3–14)
AST SERPL W P-5'-P-CCNC: 15 U/L (ref 0–45)
BILIRUB DIRECT SERPL-MCNC: 0.1 MG/DL (ref 0–0.2)
BILIRUB SERPL-MCNC: 0.4 MG/DL (ref 0.2–1.3)
BUN SERPL-MCNC: 4 MG/DL (ref 7–30)
CALCIUM SERPL-MCNC: 7.8 MG/DL (ref 8.5–10.1)
CHLORIDE BLD-SCNC: 115 MMOL/L (ref 94–109)
CO2 SERPL-SCNC: 24 MMOL/L (ref 20–32)
CREAT SERPL-MCNC: 0.92 MG/DL (ref 0.52–1.04)
GFR SERPL CREATININE-BSD FRML MDRD: 75 ML/MIN/1.73M2
GLUCOSE BLD-MCNC: 102 MG/DL (ref 70–99)
MAGNESIUM SERPL-MCNC: 2.3 MG/DL (ref 1.6–2.3)
POTASSIUM BLD-SCNC: 3.8 MMOL/L (ref 3.4–5.3)
PROT SERPL-MCNC: 5.6 G/DL (ref 6.8–8.8)
SODIUM SERPL-SCNC: 141 MMOL/L (ref 133–144)
TTG IGA SER-ACNC: 0.3 U/ML

## 2021-08-25 PROCEDURE — G0378 HOSPITAL OBSERVATION PER HR: HCPCS

## 2021-08-25 PROCEDURE — 258N000003 HC RX IP 258 OP 636: Performed by: HOSPITALIST

## 2021-08-25 PROCEDURE — 250N000013 HC RX MED GY IP 250 OP 250 PS 637: Performed by: HOSPITALIST

## 2021-08-25 PROCEDURE — 84120 ASSAY OF URINE PORPHYRINS: CPT | Performed by: INTERNAL MEDICINE

## 2021-08-25 PROCEDURE — 99225 PR SUBSEQUENT OBSERVATION CARE,LEVEL II: CPT | Performed by: STUDENT IN AN ORGANIZED HEALTH CARE EDUCATION/TRAINING PROGRAM

## 2021-08-25 PROCEDURE — 82248 BILIRUBIN DIRECT: CPT | Performed by: HOSPITALIST

## 2021-08-25 PROCEDURE — 250N000011 HC RX IP 250 OP 636: Performed by: HOSPITALIST

## 2021-08-25 PROCEDURE — 83735 ASSAY OF MAGNESIUM: CPT | Performed by: HOSPITALIST

## 2021-08-25 PROCEDURE — 80048 BASIC METABOLIC PNL TOTAL CA: CPT | Performed by: HOSPITALIST

## 2021-08-25 PROCEDURE — 99207 PR CDG-CODE CATEGORY CHANGED: CPT | Performed by: STUDENT IN AN ORGANIZED HEALTH CARE EDUCATION/TRAINING PROGRAM

## 2021-08-25 PROCEDURE — 250N000013 HC RX MED GY IP 250 OP 250 PS 637: Performed by: PHYSICIAN ASSISTANT

## 2021-08-25 PROCEDURE — 250N000013 HC RX MED GY IP 250 OP 250 PS 637: Performed by: INTERNAL MEDICINE

## 2021-08-25 PROCEDURE — 96376 TX/PRO/DX INJ SAME DRUG ADON: CPT

## 2021-08-25 PROCEDURE — 36415 COLL VENOUS BLD VENIPUNCTURE: CPT | Performed by: HOSPITALIST

## 2021-08-25 RX ORDER — ONDANSETRON 4 MG/1
4 TABLET, ORALLY DISINTEGRATING ORAL EVERY 6 HOURS PRN
Qty: 10 TABLET | Refills: 0 | Status: SHIPPED | OUTPATIENT
Start: 2021-08-25 | End: 2024-05-07

## 2021-08-25 RX ORDER — CYCLOBENZAPRINE HCL 10 MG
10 TABLET ORAL EVERY 8 HOURS PRN
Qty: 10 TABLET | Refills: 0 | Status: SHIPPED | OUTPATIENT
Start: 2021-08-25 | End: 2022-02-16

## 2021-08-25 RX ORDER — AMOXICILLIN 250 MG
1 CAPSULE ORAL 2 TIMES DAILY PRN
Qty: 10 TABLET | Refills: 0 | Status: SHIPPED | OUTPATIENT
Start: 2021-08-25 | End: 2024-05-07

## 2021-08-25 RX ORDER — OXYCODONE HYDROCHLORIDE 5 MG/1
5 TABLET ORAL
Qty: 12 TABLET | Refills: 0 | Status: SHIPPED | OUTPATIENT
Start: 2021-08-25 | End: 2022-02-16

## 2021-08-25 RX ORDER — BUPROPION HYDROCHLORIDE 75 MG/1
75 TABLET ORAL DAILY
Status: DISCONTINUED | OUTPATIENT
Start: 2021-08-25 | End: 2021-08-26

## 2021-08-25 RX ADMIN — OXYCODONE HYDROCHLORIDE 5 MG: 5 TABLET ORAL at 13:16

## 2021-08-25 RX ADMIN — CYCLOBENZAPRINE 10 MG: 10 TABLET, FILM COATED ORAL at 23:43

## 2021-08-25 RX ADMIN — POLYETHYLENE GLYCOL 3350 17 G: 17 POWDER, FOR SOLUTION ORAL at 20:28

## 2021-08-25 RX ADMIN — SIMETHICONE 80 MG: 80 TABLET, CHEWABLE ORAL at 02:31

## 2021-08-25 RX ADMIN — HYDROMORPHONE HYDROCHLORIDE 0.2 MG: 0.2 INJECTION, SOLUTION INTRAMUSCULAR; INTRAVENOUS; SUBCUTANEOUS at 02:31

## 2021-08-25 RX ADMIN — ACETAMINOPHEN 650 MG: 325 TABLET, FILM COATED ORAL at 15:30

## 2021-08-25 RX ADMIN — CYCLOBENZAPRINE 10 MG: 10 TABLET, FILM COATED ORAL at 07:50

## 2021-08-25 RX ADMIN — OXYCODONE HYDROCHLORIDE 5 MG: 5 TABLET ORAL at 17:49

## 2021-08-25 RX ADMIN — HYDROMORPHONE HYDROCHLORIDE 0.2 MG: 0.2 INJECTION, SOLUTION INTRAMUSCULAR; INTRAVENOUS; SUBCUTANEOUS at 07:51

## 2021-08-25 RX ADMIN — OXYCODONE HYDROCHLORIDE 5 MG: 5 TABLET ORAL at 05:54

## 2021-08-25 RX ADMIN — ACETAMINOPHEN 650 MG: 325 TABLET, FILM COATED ORAL at 21:17

## 2021-08-25 RX ADMIN — OXYCODONE HYDROCHLORIDE 5 MG: 5 TABLET ORAL at 21:17

## 2021-08-25 RX ADMIN — ACETAMINOPHEN 650 MG: 325 TABLET, FILM COATED ORAL at 07:50

## 2021-08-25 RX ADMIN — CYCLOBENZAPRINE 10 MG: 10 TABLET, FILM COATED ORAL at 15:30

## 2021-08-25 RX ADMIN — DEXTROSE AND SODIUM CHLORIDE: 5; 900 INJECTION, SOLUTION INTRAVENOUS at 16:04

## 2021-08-25 NOTE — PROGRESS NOTES
"General Surgery Progress Note    Admission Date: 8/23/2021  Today's Date: 8/25/2021         Assessment:      Dae MCCLENDON Haroon Castañeda is a 46 year old female   S/P lap appendectomy  POD #2     Diagnosis: acute appendicitis   Additionally, concern for 2+ wks of postprandial pain and ? Gastric mass on recent EGD         Plan:   From surgical perspective, can discharge when tolerating liquids and pain controlled.  Uncertain if any further GI workup required while inpatient, or could follow up at outpatient    Pain: ok for Oxycodone q 3 hours.  Pain Right of midline and thought to be less incisional per patient.  Activity: try to ambulate 3-4 times daily.  Up to chair, change position frequently.  Encouraged IS use.  Bowel: start senokot   Antibiotics: received Zosyn.  No further plan for abx.  Diet: advanced to full liquid diet.  ADAT.  If having EGD tomorrow, NPO after midnight.  IVFs: down to 50mL/hr.  Hospitalist managing.  DVT prophylaxis: PCD's.  Consider Lovenox if prolonged stay intended.  Dispo: Per Medicine.  Ok to discharge to home from surgical perspective.  Follow up with phone call in ~ 2 weeks to check in on recovery.  Surgery will sign off at this time.  Please call with any concerns.        Interval History:   Feeling better this morning.  Still moving slowly but up to chair.  Needs assistance in/out of bed.  Positive bowel sounds, but minimal flatus and no BM yet.  Flexeril helping with pain some.  Heating pad did not, and still would like no ice.            Physical Exam:   BP 90/51 (BP Location: Left arm)   Pulse 67   Temp 98.2  F (36.8  C)   Resp 16   Ht 1.626 m (5' 4\")   Wt 50.3 kg (111 lb)   SpO2 98%   BMI 19.05 kg/m    I/O last 3 completed shifts:  In: -   Out: 200 [Urine:200]  General: NAD, pleasant, alert and oriented x3  Abdomen: soft, tender, non distended.  Incision: Bandaids removed.  Mild drainage but controlled now.  LUQ incision little more red.  Change dressing if needed.  "       LABS:  Lab Results   Component Value Date    WBC 9.8 08/24/2021    HGB 13.1 08/24/2021    HCT 37.9 08/24/2021     08/24/2021     08/25/2021    POTASSIUM 3.8 08/25/2021    CHLORIDE 115 (H) 08/25/2021    CO2 24 08/25/2021    BUN 4 (L) 08/25/2021    CR 0.92 08/25/2021     (H) 08/25/2021    SED 7 03/13/2017    DD <0.3 05/15/2019    TROPI <0.015 05/15/2019    AST 15 08/25/2021    ALT 27 08/25/2021    ALKPHOS 33 (L) 08/25/2021    BILITOTAL 0.4 08/25/2021    KRISTI 27 08/23/2016    INR 1.04 08/22/2008       Torito Duron PA-C  Pager: [phone]  [facility]: [phone]

## 2021-08-25 NOTE — PLAN OF CARE
A and O x4. Anxious at times. Hesitant to increase activity secondary to increased pain with movement. Up to BR with 1, gait belt. Voiding without difficulty. No BM, not yet passing flatus. BS more active this evening, order for simethicone obtained for possible gas pain. Continued abdominal pain and incisional pain. Taking PO oxycodone and IV dilaudid prn. Low BP, stable; MD aware, IVF running at 100 mL/hr. GI, general surgery, OB/GYN, and hospitalist following. Scoring green on aggression screening tool.  visiting at bedside.

## 2021-08-25 NOTE — PROGRESS NOTES
[facility]    Medicine Progress Note - Hospitalist Service       Date of Admission:  8/23/2021    Assessment & Plan           Acute appendicitis  Abdominal pain - 2+ weeks  2+ weeks of postprandial pain that has progressed.  Discussed with General Surgery - we are not convinced that the appendicitis is the culprit for the 2 weeks of abdominal pain. Leukocytosis noted.  Lipase elevated - unclear correlation/causation - CT showed normal pancreas. Differentials lengthy - including ovarian cysts, endometriosis, IBS, etc.   IgG4 disease unlikely, urinary porphyrins ordered 8/25, TTG ordered 8/24  S/p laparoscopic appy on 8/23 but still with significant pain  Will need to monitor her pain as she recovers from her surgery, but seems out of proportion to the appendicitis that has been removed  Imaging revealed pelvic congestion?  UPT negative  Lab workup was largely unremarkable  plan  - General surgery and following  - continue clears with postoperative pain control  - [facility] consulted. Could her pain be related to pelvic congestive syndrome? Consider gyn consult, will discuss with GI. Hard to gauge how much pain is postoperative in this setting  - prn analgesia and antiemetics  - encouraged ambulation  - reports pain improving 8/25, advancing to full liquid and seems to be tolerating it  - titrate off IV pain meds as able.    Duodenal adenoma  Seen on EGD  Plan  - GI planning on EUS as an outpatient, but not acutely  - appreciate their consult     Hypokalemia  Hyperchloremia  Non anion gap metabolic acidosis  Likely in relation to GI losses with diarrhea and decreased PO intake.   - Follow-up on the results       Diet: Advance Diet as Tolerated: Full Liquid Diet  Snacks/Supplements Adult: Ensure Enlive; Between Meals    DVT Prophylaxis: Pneumatic Compression Devices  Sal Catheter: Not present  Central Lines: None  Code Status: Full Code      Disposition Plan   Expected discharge: 08/25/2021    "recommended to prior living arrangement once pain improved.     The patient's care was discussed with the Patient.    Bridger Erazo MD  Hospitalist Service  [facility]  Securely message with the Vocera Web Console (learn more here)  Text page via [employer] Paging/Directory      Clinically Significant Risk Factors Present on Admission                   ______________________________________________________________________    Interval History   States she is starting to feel a bit better, pain is unchanged in location, quality, but intensity has decreased. \"I'm starting to feel like a person again\". Advancing diet. No other new symptoms such as fever, chills, rights, n/v/d, bleeding, rash.    Data reviewed today: I reviewed all medications, new labs and imaging results over the last 24 hours. I personally reviewed no images or EKG's today.    Physical Exam   Vital Signs: Temp: 98.2  F (36.8  C) Temp src: Oral BP: 90/51 Pulse: 67   Resp: 16 SpO2: 98 % O2 Device: None (Room air)    Weight: 111 lbs 0 oz  Gen: NAD, pleasant  HEENT: Normocephalic, EOMI, MMM  Resp: no crackles,  no wheezes, no increased work of resp  CV: S1S2 heard, reg rhythm, reg rate, no pedal edema  Abdo: soft, mildly tender on palp, fairly diffusely, nonrigid, bowel sounds present  Ext: calves nontender, well perfused  Neuro: AAOx3, CN grossly intact, no facial asymmetry    Data   Recent Labs   Lab 08/25/21  0714 08/24/21  0937 08/24/21  0015 08/23/21  1637   WBC  --  9.8  --  12.8*   HGB  --  13.1  --  12.6   MCV  --  89  --  91   PLT  --  246  --  229    137  --  144   POTASSIUM 3.8 3.8 3.6 2.7*   CHLORIDE 115* 109  --  121*   CO2 24 19*  --  18*   BUN 4* 6*  --  7   CR 0.92 0.77  --  0.61   ANIONGAP 2* 9  --  5   CHIDI 7.8* 8.6  --  6.1*   * 95  --  64*   ALBUMIN 2.7* 3.2*  --  2.4*   PROTTOTAL 5.6* 6.9  --  5.1*   BILITOTAL 0.4 0.8  --  0.3   ALKPHOS 33* 43  --  31*   ALT 27 35  --  29   AST 15 20  --  18 "   LIPASE  --  821*  --  909*

## 2021-08-25 NOTE — PLAN OF CARE
Dae is here POD# 2 Lap Appendectomy due to Acute Appenditicitis and 2wks of Abdominal pain.  A&OX4, Cooperative, anxious at times.  VSS ex. Hypotensive. MD aware and pts baseline.  Continues to have diffuse and incisional abdominal pain, alternating PRN Diaudid and Oxycodone.  Flexril given x1 with moderate relief.  Simethicone given x1.  Pt has D5+NS running at 50ml/hr.  Bowel sounds hypoactive, neg Flatus.  Lap sites x3 covered with surgical glue and bandaids.  Up with 1A with GB.  Tolerating clear liquid, educated on small sips.  Continue to be worked up for abdominal pain.  GI, GYN and Gen. Surg all following.  discharge home with  support when medically stable.

## 2021-08-25 NOTE — PLAN OF CARE
A and O x4. Low BP with complaints of dizziness when up, MD aware. Tearful at times this morning, brighter affect and reports feeling more hopeful this afternoon. Pain control improving, pt taking PO oxycodone, tylenol, and flexeril prn. BS active, no distention. Tolerating full liquid diet with addition of a few items from regular diet menu. Oral intake improving. No nausea. Voiding without difficulty. Incision site with scant sanguinous drainage, steri strips and band aids intact. Pt states that she anticipates discharge tomorrow or the next day. Mother and  visited today. Scoring green on aggression screening tool. Belongings at bedside.

## 2021-08-25 NOTE — DISCHARGE INSTRUCTIONS
[facility] - [facility]  Discharge Instructions: Post-Operative Laparoscopic Appendectomy    ACTIVITY    Expect to feel tired after your surgery.  This will gradually resolve.      Take frequent, short walks and increase your activity gradually.      Avoid strenuous physical activity or heavy lifting greater than 15-20 lbs. for 2-3 weeks.  You may climb stairs.    You may drive without restrictions when you are not using any prescription pain medication and feel comfortable in a car.    You may return to work/school when you are comfortable without any prescription pain medication.    WOUND CARE    You may remove your outer dressing or Band-Aids and shower 48 hours after the surgery.  Pat your incisions dry and leave them open to air.  Re-apply dressing (Band-Aids or gauze/tape) as needed for comfort or drainage.    You may have steri-strips (looks like white tape) on your incision.  You may peel off the steri-strips 2 weeks after your surgery if they have not peeled off on their own.     Do not soak your incisions in a tub or pool for 2 weeks.     Do not apply any lotions, creams, or ointments to your incisions.    A ridge under your incisions is normal and will gradually resolve.    DIET    Start with liquids, then gradually resume your regular diet as tolerated.  Avoid heavy, spicy, and greasy meals for 2-3 days.    Drink plenty of fluids to stay hydrated.    PAIN    Expect some tenderness and discomfort at the incision sites.  Use the prescribed pain medication at your discretion.  Expect gradual resolution of your pain over several days.    You may take ibuprofen with food (unless you have been told not to) or acetaminophen/Tylenol instead of or in addition to your prescribed pain medication.  If you are taking Norco or Percocet, do not take any additional acetaminophen/Tylenol.    Do not drink alcohol or drive while you are taking pain medications.    You may apply ice to your incisions in 20  minute intervals as needed for the next 48 hours.  After that time, consider switching to heat if you prefer.    EXPECTATIONS    Pain medications can cause constipation.  Limit use when possible.  Take over the counter stool softener/stimulant, such as Colace or Senna, 1-2 times a day with plenty of water.  You may take a mild over the counter laxative, such as Miralax or a suppository, as needed.  You make discontinue these medications once you are having regular bowel movements and/or are no longer taking your narcotic pain medication.     You may have shoulder or upper back discomfort due to the gas used in surgery.  This is temporary and should resolve in 48-72 hours.  Short, frequent walks may help with this.    If you are unable to urinate, or feel as though you are not emptying your bladder adequately, we recommend you call our office and/or seek care at an ER or Urgent Care facility if after hours.    FOLLOW UP    Our office will contact you in approximately 2-3 weeks to check on your progress and answer any questions you may have.  If you are doing well, you will not need to return for a follow up appointment.  If any concerns are identified over the phone, we will help you make an appointment to see a provider.     If you have not received a phone call, have any questions or concerns, or would like to be seen, please call us at [phone] and ask to speak with our nurse.  We are located at [address] [place] [address].    CALL OUR OFFICE -[phone] IF YOU HAVE:     Chills or fever above 101 F.    Increased redness, warmth, or drainage at your incisions.    Significant bleeding.    Pain not relieved by your pain medication or rest.    Increasing pain after the first 48 hours.    Any other concerns or questions.           Revised September 2020

## 2021-08-25 NOTE — PROGRESS NOTES
"[facility] Progress Note     IMPRESSION:  Sub-acute abd pain  Duodenal adenoma - planned for outpt resection    DDx for her pain is becoming obscure given ongoing severity and minimal objective findings.  Consult from OB regarding pelvic congestion noted.  Mild elevation in lipase without other meeting criteria for pancreatitis.  Abdominal wall pain, bowel dysmotility seem unlikely, however local anesthetic injection to the RLQ may be reasonable pending below.  Low suspicion for diagnoses associated with abnormal/diagnostic imaging such as IBD, epiploic appendagitis, vascular compromise/thrombus/vasculitis, omental infarction, intussusception, ileus.  Obscure causes which would evade imaging such as amyloidosis or porphyria (commonly associated with SIADH/hyponatremia, and occasionally elevated lipase) could be considered.  The latter of which might be ruled out with urinary porphyrins, which I have ordered.    RECOMMENDATIONS:  Advance diet as tolerated  Encourage ambulation  DVT prophylaxis  If pain intensifies with dietary advancement, recheck lipase, liver chemistries, lactate  Consider abdominal wall anesthetic injection    Medardo Barksdale DO   [facility]  Cell [phone]    ________________________________________________________________________      SUBJECTIVE:  Ongoing severe pain LLQ-->generalized, worse with movement, valsalva, light palpation.   Vomited last night.  No diarrhea.     OBJECTIVE:  BP 90/51 (BP Location: Left arm)   Pulse 67   Temp 98.2  F (36.8  C)   Resp 16   Ht 1.626 m (5' 4\")   Wt 50.3 kg (111 lb)   SpO2 98%   BMI 19.05 kg/m    Temp (24hrs), Av.3  F (36.8  C), Min:98.1  F (36.7  C), Max:98.5  F (36.9  C)    Patient Vitals for the past 72 hrs:   Weight   21 1116 50.3 kg (111 lb)       Intake/Output Summary (Last 24 hours) at 2021 0914  Last data filed at 2021 1700  Gross per 24 hour   Intake --   Output 200 ml   Net -200 ml      "   PHYSICAL EXAM  GEN: Alert, oriented x3, communicative and in NAD.    ABD: ND, +BS, incisions without erythema, severe pain diffusely with light palpation RLQ > LLQ    Additional Data:  I have reviewed the patient's new clinical lab results:     Recent Labs   Lab Test 08/24/21  0937 08/23/21  1637 05/15/19  1054   WBC 9.8 12.8* 4.7   HGB 13.1 12.6 13.9   MCV 89 91 91    229 220     Recent Labs   Lab Test 08/25/21  0714 08/24/21  0937 08/24/21  0015 08/23/21  1637   POTASSIUM 3.8 3.8 3.6 2.7*   CHLORIDE 115* 109  --  121*   CO2 24 19*  --  18*   BUN 4* 6*  --  7   ANIONGAP 2* 9  --  5     Recent Labs   Lab Test 08/25/21  0714 08/24/21  1728 08/24/21  0937 08/23/21  1637 01/04/18  1115 01/23/17  1800   ALBUMIN 2.7*  --  3.2* 2.4*  --   --    BILITOTAL 0.4  --  0.8 0.3  --   --    ALT 27  --  35 29  --   --    AST 15  --  20 18  --   --    PROTEIN  --  Negative  --   --  Negative Negative   LIPASE  --   --  449* 459*  --   --

## 2021-08-26 ENCOUNTER — APPOINTMENT (OUTPATIENT)
Dept: ULTRASOUND IMAGING | Facility: CLINIC | Age: 46
End: 2021-08-26
Attending: EMERGENCY MEDICINE
Payer: COMMERCIAL

## 2021-08-26 VITALS
RESPIRATION RATE: 14 BRPM | WEIGHT: 111 LBS | DIASTOLIC BLOOD PRESSURE: 54 MMHG | OXYGEN SATURATION: 98 % | TEMPERATURE: 98.8 F | SYSTOLIC BLOOD PRESSURE: 95 MMHG | HEART RATE: 89 BPM | BODY MASS INDEX: 18.95 KG/M2 | HEIGHT: 64 IN

## 2021-08-26 LAB
PATH REPORT.COMMENTS IMP SPEC: NORMAL
PATH REPORT.COMMENTS IMP SPEC: NORMAL
PATH REPORT.FINAL DX SPEC: NORMAL
PATH REPORT.GROSS SPEC: NORMAL
PATH REPORT.MICROSCOPIC SPEC OTHER STN: NORMAL
PATH REPORT.RELEVANT HX SPEC: NORMAL
PHOTO IMAGE: NORMAL

## 2021-08-26 PROCEDURE — G0378 HOSPITAL OBSERVATION PER HR: HCPCS

## 2021-08-26 PROCEDURE — 99284 EMERGENCY DEPT VISIT MOD MDM: CPT | Mod: 25

## 2021-08-26 PROCEDURE — 250N000013 HC RX MED GY IP 250 OP 250 PS 637: Performed by: HOSPITALIST

## 2021-08-26 PROCEDURE — 250N000013 HC RX MED GY IP 250 OP 250 PS 637: Performed by: STUDENT IN AN ORGANIZED HEALTH CARE EDUCATION/TRAINING PROGRAM

## 2021-08-26 PROCEDURE — 250N000013 HC RX MED GY IP 250 OP 250 PS 637: Performed by: PHYSICIAN ASSISTANT

## 2021-08-26 PROCEDURE — 93971 EXTREMITY STUDY: CPT | Mod: RT

## 2021-08-26 PROCEDURE — 88304 TISSUE EXAM BY PATHOLOGIST: CPT | Mod: 26 | Performed by: PATHOLOGY

## 2021-08-26 PROCEDURE — 99217 PR OBSERVATION CARE DISCHARGE: CPT | Performed by: STUDENT IN AN ORGANIZED HEALTH CARE EDUCATION/TRAINING PROGRAM

## 2021-08-26 RX ORDER — BUPROPION HYDROCHLORIDE 150 MG/1
150 TABLET ORAL EVERY MORNING
Status: DISCONTINUED | OUTPATIENT
Start: 2021-08-26 | End: 2021-08-26 | Stop reason: HOSPADM

## 2021-08-26 RX ORDER — BUPROPION HYDROCHLORIDE 150 MG/1
150 TABLET ORAL EVERY MORNING
Status: ON HOLD | COMMUNITY
End: 2022-10-05

## 2021-08-26 RX ORDER — POLYETHYLENE GLYCOL 3350 17 G/17G
17 POWDER, FOR SOLUTION ORAL DAILY PRN
Qty: 510 G | Refills: 0 | Status: SHIPPED | OUTPATIENT
Start: 2021-08-26 | End: 2024-05-07

## 2021-08-26 RX ADMIN — OXYCODONE HYDROCHLORIDE 5 MG: 5 TABLET ORAL at 01:50

## 2021-08-26 RX ADMIN — ACETAMINOPHEN 650 MG: 325 TABLET, FILM COATED ORAL at 07:06

## 2021-08-26 RX ADMIN — OXYCODONE HYDROCHLORIDE 5 MG: 5 TABLET ORAL at 07:06

## 2021-08-26 RX ADMIN — CYCLOBENZAPRINE 10 MG: 10 TABLET, FILM COATED ORAL at 12:53

## 2021-08-26 RX ADMIN — SENNOSIDES AND DOCUSATE SODIUM 1 TABLET: 8.6; 5 TABLET ORAL at 09:21

## 2021-08-26 RX ADMIN — BUPROPION HYDROCHLORIDE 150 MG: 150 TABLET, FILM COATED, EXTENDED RELEASE ORAL at 10:12

## 2021-08-26 ASSESSMENT — MIFFLIN-ST. JEOR: SCORE: 1128.96

## 2021-08-26 NOTE — PLAN OF CARE
A and O x 4. Bright affect, less anxious. VSS. Up with SBA. No dizziness. Mild abdominal discomfort, took flexeril x1. Steri strips intact with dried drainage. BS audible and active. No abdominal distention. Passing flatus, no BM. No nausea. Reviewed all discharge instructions and medications with pt. Pt discharged to home with .

## 2021-08-26 NOTE — PROGRESS NOTES
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    RECOMMENDATIONS:  Advance diet  Okay from a GI perspective to plan discharge if able to tolerate diet, having BMs    Medardo Shamir, DO   [facility]  Cell [phone]    ________________________________________________________________________      SUBJECTIVE:  Improved pain yesterday AM - throughout the day, only slight pain this AM.  No diarrhea or vomiting.  Asking about going home.      OBJECTIVE:  BP 95/55 (BP Location: Left arm)   Pulse 80   Temp 98.5  F (36.9  C) (Oral)   Resp 16   Ht 1.626 m (5' 4\")   Wt 50.3 kg (111 lb)   SpO2 99%   BMI 19.05 kg/m    Temp (24hrs), Av.2  F (36.8  C), Min:97.7  F (36.5  C), Max:98.5  F (36.9  C)    Patient Vitals for the past 72 hrs:   Weight   21 1116 50.3 kg (111 lb)     No intake or output data in the 24 hours ending 21 0844     PHYSICAL EXAM  GEN: Alert, oriented x3, communicative and in NAD.    ABD: ND, +BS, mild TTP without rebound    Additional Data:  I have reviewed the patient's new clinical lab results:     Recent Labs   Lab Test 21  0937 21  1637 05/15/19  1054   WBC 9.8 12.8* 4.7   HGB 13.1 12.6 13.9   MCV 89 91 91    229 220     Recent Labs   Lab Test 21  0714 21  0937 21  0015 21  1637   POTASSIUM 3.8 3.8 3.6 2.7*   CHLORIDE 115* 109  --  121*   CO2 24 19*  --  18*   BUN 4* 6*  --  7   ANIONGAP 2* 9  --  5     Recent Labs   Lab Test 21  0714 21  1728 21  0937 21  1637 18  1115 17  1800   ALBUMIN 2.7*  --  3.2* 2.4*  --   --    BILITOTAL 0.4  --  0.8 0.3  --   --    ALT 27  --  35 29  --   --    AST 15  --  20 18  --   --    PROTEIN  --  Negative  --   --  Negative Negative   LIPASE  --   --  449* 459*  --   --  "

## 2021-08-26 NOTE — PROVIDER NOTIFICATION
Text page to Dr. Erazo. Pt concerned about pain in the arch of her L foot, states that she has had similar pain in the past from her plantar fasciitis but that this pain radiates to her calf and back of her L knee. Pt able to stand and walk with some pain. No swelling, warmth, or redness. Call back from Dr. Erazo, OK to proceed with discharge today. RN providing education on signs/symptoms of DVT that differ from musculoskeletal pain. Pt is aware of post operative signs/symptoms that require medical or emergency attention.

## 2021-08-26 NOTE — DISCHARGE SUMMARY
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  No follow up labs or test are needed.           Unresulted Labs Ordered in the Past 30 Days of this Admission     Date and Time Order Name Status Description    8/25/2021  7:23 AM Porphyrins and Porphobilinogen Urine In process     8/23/2021  8:44 PM Surgical Pathology Exam In process       These results will be followed up by GI and surgery teams    Discharge Disposition   Discharged to home  Condition at discharge: Stable      Hospital Course   Dae Castañeda is a 46-year-old woman who presents to the hospital with approximately 2-week of history of postprandial pain.  CT abdomen demonstrated a distended appendix consistent with appendicitis.  She underwent laparoscopic appendectomy on 8/23/2021.  Postoperatively, she had pain out of proportion to expectation for appendectomy.  Gastroenterology, surgery, and gynecology were all consulted for recommendations.  Broad work-up was undertaken.  Work-up including TTG, immunoglobulin levels, CT scan, EGD, and multiple other labs was largely unremarkable.  EGD did demonstrate a duodenal adenoma that was unlikely as the cause of her pain.  Her pain did slowly improve over the next several days.  She was able to ambulate freely in the hallway and tolerate a regular diet prior to discharge.  She was eventually discharged on 8/26/2021 with  minimal to mild abdominal pain.  She will plan to follow-up with gastroenterology for duodenal adenoma resection.  She will follow her PCP.  She will follow up with general surgery on an as-needed basis.    Consultations This Hospital Stay   SURGERY GENERAL IP CONSULT  GASTROENTEROLOGY IP CONSULT  OB GYN IP CONSULT  OB GYN IP CONSULT    Code Status   Full Code    Time Spent on this Encounter   I, Bridger Erazo MD, personally saw the patient today and spent greater than 30 minutes discharging this patient.       Bridger Erazo MD  [place] [address] [facility]  [facility] [place] [address]  Phone: [phone]  ______________________________________________________________________    Physical Exam   Vital Signs: Temp: 98.8  F (37.1  C) Temp src: Oral BP: 95/54 Pulse: 89   Resp: 14 SpO2: 98 % O2 Device: None (Room air)    Weight: 111 lbs 0 oz  Gen: NAD, pleasant  HEENT: Normocephalic, EOMI, MMM  Resp: no crackles,  no wheezes, no increased work of resp  CV: S1S2 heard, reg rhythm, reg rate, no pedal edema  Abdo: soft, non tender on palp, nonrigid, bowel sounds present  Ext: calves nontender, well perfused  Neuro: AAOx3, CN grossly intact, no facial asymmetry       Primary Care Physician   Brigitte Bryan    Discharge Orders      Reason for your hospital stay    You were in the hospital due to abdominal pain and appendicitis.     Follow-up and recommended labs and tests     Follow up with primary care provider, Brigitte Bryan, within 7 days for hospital follow- up.  No follow up labs or test are needed.     Activity    Your activity upon discharge: activity as tolerated     Diet    Follow this diet upon discharge:  Regular Diet Adult       Significant Results and Procedures   Most Recent 3 CBC's:Recent Labs   Lab Test 08/24/21  0937 08/23/21  1637 05/15/19  1054   WBC 9.8 12.8* 4.7   HGB 13.1 12.6 13.9   MCV 89 91 91    229 220     Most Recent 3 BMP's:Recent Labs   Lab Test  08/25/21  0714 08/24/21  0937 08/24/21  0015 08/23/21  1637    137  --  144   POTASSIUM 3.8 3.8 3.6 2.7*   CHLORIDE 115* 109  --  121*   CO2 24 19*  --  18*   BUN 4* 6*  --  7   CR 0.92 0.77  --  0.61   ANIONGAP 2* 9  --  5   CHIDI 7.8* 8.6  --  6.1*   * 95  --  64*     Most Recent 2 LFT's:Recent Labs   Lab Test 08/25/21  0714 08/24/21  0937   AST 15 20   ALT 27 35   ALKPHOS 33* 43   BILITOTAL 0.4 0.8   ,   Results for orders placed or performed during the hospital encounter of 08/23/21   CT Abdomen Pelvis w Contrast    Narrative    CT ABDOMEN PELVIS WITH CONTRAST 8/23/2021 5:15 PM    CLINICAL HISTORY: Abdominal pain, acute, nonlocalized.    TECHNIQUE: CT scan of the abdomen and pelvis was performed following  injection of IV contrast. Multiplanar reformats were obtained. Dose  reduction techniques were used.  CONTRAST: 58 mL Isovue-370    COMPARISON: 1/9/2011    FINDINGS:   LOWER CHEST: Normal.    HEPATOBILIARY: No focal liver lesions. Unremarkable gallbladder.    PANCREAS: Normal.    SPLEEN: Normal.    ADRENAL GLANDS: Normal.    KIDNEYS/BLADDER: Normal.    BOWEL: The appendix is dilated, measuring up to 8 mm (series 3, image  134). No evidence of periappendiceal abscess or free air. No bowel  obstruction.    PELVIC ORGANS: Normal.    ADDITIONAL FINDINGS: Left renal vein is compressed between the aorta  and superior mesenteric artery with proximal dilation of the left  renal vein and dilation of the left ovarian vein associated with  pelvic varicosities. No ascites.    MUSCULOSKELETAL: No destructive bone lesions.      Impression    IMPRESSION:   1.  Dilated appendix suggestive of acute appendicitis. No evidence of  perforation or abscess.  2.  Incidental note of possible nutcracker compression of the left  renal vein with collateral flow down the left ovarian vein that could  be associated with pelvic venous congestion syndrome.    ADAIR MONTERO MD         SYSTEM ID:  [id number]       Discharge  Medications   Current Discharge Medication List      START taking these medications    Details   cyclobenzaprine (FLEXERIL) 10 MG tablet Take 1 tablet (10 mg) by mouth every 8 hours as needed for muscle spasms  Qty: 10 tablet, Refills: 0    Associated Diagnoses: Acute appendicitis with generalized peritonitis, without gangrene or abscess, unspecified whether perforation present; Acute post-operative pain      ondansetron (ZOFRAN-ODT) 4 MG ODT tab Take 1 tablet (4 mg) by mouth every 6 hours as needed for nausea or vomiting  Qty: 10 tablet, Refills: 0    Associated Diagnoses: Acute appendicitis with generalized peritonitis, without gangrene or abscess, unspecified whether perforation present; PONV (postoperative nausea and vomiting)      oxyCODONE (ROXICODONE) 5 MG tablet Take 1 tablet (5 mg) by mouth every 3 hours as needed for moderate to severe pain  Qty: 12 tablet, Refills: 0    Associated Diagnoses: Acute appendicitis with generalized peritonitis, without gangrene or abscess, unspecified whether perforation present; Acute post-operative pain      polyethylene glycol (MIRALAX) 17 GM/Dose powder Take 17 g by mouth daily as needed for constipation  Qty: 510 g, Refills: 0    Associated Diagnoses: Acute post-operative pain      senna-docusate (SENOKOT-S/PERICOLACE) 8.6-50 MG tablet Take 1 tablet by mouth 2 times daily as needed for constipation (while on pain medications)  Qty: 10 tablet, Refills: 0    Associated Diagnoses: Acute appendicitis with generalized peritonitis, without gangrene or abscess, unspecified whether perforation present; Acute post-operative pain         CONTINUE these medications which have NOT CHANGED    Details   buPROPion (WELLBUTRIN XL) 150 MG 24 hr tablet Take 150 mg by mouth every morning      drospirenone-ethinyl estradiol (ELIS) 3-0.02 MG tablet Take 1 tablet by mouth daily           Allergies   Allergies   Allergen Reactions     No Clinical Screening - See Comments      Other reaction(s):  Other, see comments  PN: Don't give medications that increase serotonin level, will end up in ICU.      Codeine GI Disturbance     Codeine      Other reaction(s): Other, see comments  PN:  Nausea     Paroxetine      serotonin syndrome, all seratonin drugs     Paroxetine      PN: ho serotonin syndrome per pet     Venlafaxine      serotonin syndrome

## 2021-08-26 NOTE — PLAN OF CARE
Shift Summary 1900-0730: Dae is here POD# 3 Lap Appendectomy due to Acute Appenditicitis and 2wks of Abdominal pain.  A&OX4, Cooperative, anxious at times.  VSS ex. Hypotensive. MD aware and pts baseline.  Continues to have diffuse and abdominal pain, alternating PRN Oxycodone and tylenol.  Flexril given x1 with moderate relief. Pt has D5+NS running at 50ml/hr.  new PIV placed in RFA. Bowel sounds active, neg Flatus. Requested some miralax.  Gave PRN dose Lap sites x3 covered with surgical glue and bandaids.  Up with 1A with GB.  Tolerating Full liquid. Continue to be worked up for abdominal pain.  GI and Gen. Surg all following.  Discharge home with  support when medically stable (1-2days)

## 2021-08-27 ENCOUNTER — HOSPITAL ENCOUNTER (EMERGENCY)
Facility: CLINIC | Age: 46
Discharge: HOME | End: 2021-08-27
Attending: EMERGENCY MEDICINE | Admitting: EMERGENCY MEDICINE
Payer: COMMERCIAL

## 2021-08-27 VITALS
RESPIRATION RATE: 17 BRPM | SYSTOLIC BLOOD PRESSURE: 121 MMHG | OXYGEN SATURATION: 95 % | HEART RATE: 72 BPM | WEIGHT: 110 LBS | BODY MASS INDEX: 18.78 KG/M2 | TEMPERATURE: 99 F | HEIGHT: 64 IN | DIASTOLIC BLOOD PRESSURE: 54 MMHG

## 2021-08-27 DIAGNOSIS — I80.8 SUPERFICIAL THROMBOPHLEBITIS OF LEFT UPPER EXTREMITY: ICD-10-CM

## 2021-08-27 PROCEDURE — 250N000013 HC RX MED GY IP 250 OP 250 PS 637: Performed by: EMERGENCY MEDICINE

## 2021-08-27 RX ORDER — CEPHALEXIN 500 MG/1
1000 CAPSULE ORAL ONCE
Status: COMPLETED | OUTPATIENT
Start: 2021-08-27 | End: 2021-08-27

## 2021-08-27 RX ORDER — CEPHALEXIN 500 MG/1
500 CAPSULE ORAL 4 TIMES DAILY
Qty: 28 CAPSULE | Refills: 0 | Status: SHIPPED | OUTPATIENT
Start: 2021-08-27 | End: 2021-09-03

## 2021-08-27 RX ORDER — IBUPROFEN 600 MG/1
600 TABLET, FILM COATED ORAL ONCE
Status: COMPLETED | OUTPATIENT
Start: 2021-08-27 | End: 2021-08-27

## 2021-08-27 RX ADMIN — IBUPROFEN 600 MG: 600 TABLET ORAL at 00:56

## 2021-08-27 RX ADMIN — CEPHALEXIN 1000 MG: 500 CAPSULE ORAL at 00:56

## 2021-08-27 ASSESSMENT — ENCOUNTER SYMPTOMS: COLOR CHANGE: 1

## 2021-08-27 NOTE — ED PROVIDER NOTES
"  History   Chief Complaint:  Arm Pain (Right arm discomfort and redness to IV site after recent Hospital stay. Denies CP or SOB. )     The history is provided by the patient.      Dae Castañeda is a 46 year old female with history of myoclonic jerking, chromosome abnormality, and serotonin syndrome who presents with arm pain. Dae discharged from a hospital stay earlier this evening around 5:00 PM. Following her discharge, she noticed the redness near her IV site. The area is now painful and sore. She took some Tylenol earlier and took some oxycodone shortly before being evaluated. She denies fevers, chest pain, and shortness of breath.     Review of Systems   Skin: Positive for color change.   All other systems reviewed and are negative.    Allergies:  Codeine  Paroxetine  Venlafaxine    Medications:  Wellbutrin XL  Flexeril  Dari  Zofran-ODT  Roxicodone  Miralax  Senokot-S/Pericolace    Past Medical History:    Abnormal involuntary movement  Chromosome abnormality  Deviated nasal septum  Female infertility associated with anovulation  MDD  Myalgia and myositis, unspecified  Ruptured ovarian cyst  Serotonin syndrome  Supervision of other normal pregnancy  Mass of duodenum  Elevated lipase  Acute appendicitis with generalized peritonitis, without gangrene or abscess, unspecified whether perforation present  Myoclonic jerking  Fibromyalgia    Past Surgical History:    Aspiration and curettage  GYN surgery  HC repair of nasal septum  Laparoscopic appendectomy  Laparoscopy     Family History:    Mother: hyperlipidemia, hypothyroidism, breast cancer  Father: depression, hypertension, CAD    Social History:  PCP: Brigitte Bryan  Presents with     Physical Exam     Patient Vitals for the past 24 hrs:   BP Temp Temp src Pulse Resp SpO2 Height Weight   08/27/21 0102 -- -- -- 72 17 95 % -- --   08/26/21 2101 121/54 99  F (37.2  C) Temporal 110 18 -- 1.626 m (5' 4\") 49.9 kg (110 lb)       Physical " Exam  Nursing note and vitals reviewed.  Constitutional:  Appears well-developed and well-nourished.   HENT:   Head:    Atraumatic.   Mouth/Throat:   Oropharynx is clear and moist. No oropharyngeal exudate.   Eyes:    Pupils are equal, round, and reactive to light.   Neck:    Normal range of motion. Neck supple.      No tracheal deviation present. No thyromegaly present.   Cardiovascular:  Normal rate, regular rhythm, no murmur   Pulmonary/Chest: Breath sounds are clear and equal without wheezes or crackles.  Abdominal:   Soft. Bowel sounds are normal. Exhibits no distension and      no mass. There is no tenderness.      There is no rebound and no guarding.   Musculoskeletal:  Right arm exam: slight redness, minimal warmth, slight swelling to the anti cubital fossa in the region of the IV site. No lymphatitis, no palpable mass. Good radial pulse.    Lymphadenopathy:  No cervical adenopathy.   Neurological:   Alert and oriented to person, place, and time.   Skin:    Skin is warm and dry. No rash noted. No pallor.     Emergency Department Course   Imaging:  US Upper Extremity Venous Duplex Right:  1. A few centimeter long segment of occlusive thrombus is present in the cephalic vein in the right antecubital fossa region.  2. No evidence of thrombus in the deep veins of the right upper extremity.  As per radiology.    Emergency Department Course:    Reviewed:  I reviewed nursing notes, vitals, past medical history and care everywhere    Assessments:  0047 I obtained history and examined the patient as noted above.     Interventions:  0056 Keflex 1,000 mg oral  0056 Ibuprofen 600 mg oral    Disposition:  The patient was discharged to home.     Impression & Plan     Medical Decision Making:  I found this patient to have a superficial thrombophlebitis with possible early cellulitis as the cause of her symptoms.  This relates to her previous IV site.  There was no sign of sepsis PE or DVT and I felt she be safely  discharged home she was given Keflex here and prescribed a 7-day course and told to use ibuprofen every 6 hours for pain and swelling as well as warm packs.  She was instructed on signs and symptoms to return for including fever worsening redness swelling or pain.  There was no sign of abscess.    Covid-19  Dae Castañeda was evaluated during a global COVID-19 pandemic, which necessitated consideration that the patient might be at risk for infection with the SARS-CoV-2 virus that causes COVID-19.   Applicable protocols for evaluation were followed during the patient's care.   COVID-19 was considered as part of the patient's evaluation.  No testing indicated.    Diagnosis:    ICD-10-CM    1. Superficial thrombophlebitis of left upper extremity  I80.8      Discharge Medications:  Discharge Medication List as of 8/27/2021 12:47 AM      START taking these medications    Details   cephALEXin (KEFLEX) 500 MG capsule Take 1 capsule (500 mg) by mouth 4 times daily for 7 days, Disp-28 capsule, R-0, E-Prescribe           Scribe Disclosure:  Anushka ESTEVEZ, am serving as a scribe at 12:36 AM on 8/27/2021 to document services personally performed by Virginia Khan MD based on my observations and the provider's statements to me.              Virginia Khan MD  08/27/21 4423

## 2021-08-28 LAB
COPRO1/CREAT UR-SRTO: 3 UMOL/MOL CRT
COPRO3/CREAT UR-SRTO: 7 UMOL/MOL CRT
CREAT 24H UR-MRATE: NORMAL MG/D
CREAT UR-MCNC: 80 MG/DL
HEPTACARBOXYLATE/CREAT UR-SRTO: 0 UMOL/MOL CRT
PBG 24H UR-SRATE: NORMAL UMOL/D
PBG UR-SCNC: < 5 UMOL/L
PORPHYRIN FRACT 24H UR-IMP: NEGATIVE
UROPOR/CREAT 24H UR-SRTO: 1 UMOL/MOL CRT

## 2021-08-31 ENCOUNTER — TELEPHONE (OUTPATIENT)
Dept: SURGERY | Facility: CLINIC | Age: 46
End: 2021-08-31

## 2021-08-31 NOTE — TELEPHONE ENCOUNTER
Name of caller: Patient    Reason for Call:  Still in too much pain, barely able to walk, cannot lay on side or  anything. Not taking pain meds except for ibuprofen.     Surgeon:  Dr. Contreras     Recent Surgery:  Yes.    If yes, when & what type:  8/23/21    LAPAROSCOPIC APPENDECTOMY    Best phone number to reach pt at is: [phone]    Ok to leave a message with medical info? Yes.

## 2021-08-31 NOTE — TELEPHONE ENCOUNTER
Procedure:  Laparoscopic appendectomy    Date:  08/31/2021    Surgeon:  Diane    Patient reporting ongoing pain post surgery.  Pain near umbilicus, but spreads through lower abdomen, primarily    Pain with movement. No pain at rest    No s/s of infection or post op complication     She is not utilizing narcotic pain med or flexeril as she does not feel the pain is severe enough for those medications    Informed her that it does sound like normal post op pain/healing, even though it has been over a week. Pain should not get worse    Recommendations:    -alternate ice and heat  -continue utilizing ibuprofen. Can add in tylenol  -try taking flexeril and oxycodone at night  -can try abdominal binder  -if symptoms persist or become worse, she should call and schedule post op visit in person    She is in agreement and will call PRN    Surgical Pathology pushed to [facility] per her request    Beverly Gaspar RN-BSN

## 2021-09-14 ENCOUNTER — TELEPHONE (OUTPATIENT)
Dept: SURGERY | Facility: CLINIC | Age: 46
End: 2021-09-14

## 2021-09-14 NOTE — PROGRESS NOTES
[facility] Office Visit Note    Subjective: Dae Castañeda is here for her first postoperative visit.  She underwent a laparoscopic appendectomy by Dr. Contreras on 8/23/21.  She presents to clinic today for concern of persistent abdominal pain.  The pain is not severe enough to need narcotics or flexeril but she does take ibuprofen intermittently.  She states she had chronic abdominal pain prior to surgery but since surgery there are a couple new spots of pain.  One of the worst locations is periumbilical and this is exacerbated with movement.  The pain surrounds the umbilical incision.  She also has spasms of pain in right lower quadrant and left midabdomen which are worse with pushing on the abdomen.  Dae is eating a normal diet and she is having regular bowel movements.  Her stools have been large in size but do come out in thin shapes.  The patient states she is slowly resuming normal activity.  The patient denies fever/chills, n/v/d, or wound concerns.      Of note, patient did have outpatient colonoscopy on 8/13 that was without abnormalities.  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  Dae underwent EUS/ERCP for removal of ampullary adenoma and pancreatic stent placement on 9/7.  She had CT abd/pelvis 9/8 that was without evidence of hemorrhage or acute inflammation within pancreas following deployment of pancreatic stent.  Her lipase and LFTs were trending downward following procedure.  Of importance, patient was also seen by gynecology during her appendicitis hospitalization for concern of pelvic congestion syndrome causing her chronic pain.  It was recommended that she recover from her appendectomy and then schedule an appointment with Dr. Garrison for consideration of hysterectomy if all other sources of pain were ruled out.       Objective:  PHYSICAL EXAM:  GENERAL: VS reviewed, alert, oriented, no acute distress  LUNGS:  Normal respiratory effort, no wheezing  ABDOMEN:  Soft, non-distended, point tenderness at umbilical incision and surrounding umbilical incision, tenderness to left abdomen when press on right, no peritoneal signs  INCISION: Clean, dry, and intact. No surrounding erythema.  EXTREMITIES: Moving all extremities, no gross deformities, well perfused, no lower extremity edema or tenderness  NEUROLOGICAL: Grossly non-focal, mood & affect appropriate    Pathology:   Final Diagnosis   A.  Appendix:  - Appendicitis without perforation or atypical cellular proliferations.     Assessment and Plan:  Dae Castañeda is 3 weeks s/p laparoscopic appendectomy  - Some of the periumbilical pain is likely due to fascial closure. I did prescribe flexeril for this pain in the hopes that relaxation of those muscles will help with this.  - CT abd/pelvis 9/8 is reassuring of no surgical complications.  - Follow up with GI for stent removal approximately 2 weeks from placement. Follow up with their team sooner if no improvement in abdominal pain with flexeril.  - If patient continues to have pain following removal of stent, I do recommend follow up with gynecology for concern of symptoms being caused by pelvic congestion syndrome. She had been offered hysterectomy for this in the past, especially in the setting of known uterine fibroid.  - Wound(s) healing well, continue to keep clean and dry.  - Advance activity as tolerated, no heavy lifting > 20 lbs or strenuous exercise x 1-2 weeks.   - Follow up as needed, call if any concerns.    Renetta Lemus PA-C  Please route or send letter to:  Primary Care Provider (PCP)    30 minutes spent on date of the encounter doing patient visit, chart review, and documentation.

## 2021-09-14 NOTE — TELEPHONE ENCOUNTER
Procedure:  Laparoscopic appendectomy    Date:  08/23/2021    Surgeon:  Diane    Patient c/o ongoing, intermittent pain in two specific locations.    No correlation with movement or rest.    Hard to discuss in great detail. Does not sound nerve related    She will see PA in clinic tomorrow    Beverly Gaspar RN-BSN

## 2021-09-14 NOTE — TELEPHONE ENCOUNTER
Patient had a lap appy 8/23/21 [facility] and is having consistent abdominal pain she would like to discuss if this is normal    Phone: [phone]    Message ok

## 2021-09-15 ENCOUNTER — OFFICE VISIT (OUTPATIENT)
Dept: SURGERY | Facility: CLINIC | Age: 46
End: 2021-09-15
Payer: COMMERCIAL

## 2021-09-15 VITALS — SYSTOLIC BLOOD PRESSURE: 90 MMHG | HEART RATE: 101 BPM | DIASTOLIC BLOOD PRESSURE: 60 MMHG

## 2021-09-15 DIAGNOSIS — Z09 SURGERY FOLLOW-UP EXAMINATION: Primary | ICD-10-CM

## 2021-09-15 PROCEDURE — 99024 POSTOP FOLLOW-UP VISIT: CPT | Performed by: PHYSICIAN ASSISTANT

## 2021-09-15 RX ORDER — CYCLOBENZAPRINE HCL 5 MG
5 TABLET ORAL 3 TIMES DAILY PRN
Qty: 20 TABLET | Refills: 0 | Status: ON HOLD | OUTPATIENT
Start: 2021-09-15 | End: 2022-10-05

## 2021-10-24 ENCOUNTER — HEALTH MAINTENANCE LETTER (OUTPATIENT)
Age: 46
End: 2021-10-24

## 2022-02-16 ENCOUNTER — HOSPITAL ENCOUNTER (EMERGENCY)
Facility: CLINIC | Age: 47
Discharge: HOME | End: 2022-02-16
Attending: EMERGENCY MEDICINE | Admitting: EMERGENCY MEDICINE
Payer: COMMERCIAL

## 2022-02-16 VITALS
SYSTOLIC BLOOD PRESSURE: 100 MMHG | DIASTOLIC BLOOD PRESSURE: 52 MMHG | RESPIRATION RATE: 16 BRPM | HEART RATE: 91 BPM | BODY MASS INDEX: 19.12 KG/M2 | TEMPERATURE: 97.2 F | HEIGHT: 64 IN | OXYGEN SATURATION: 98 % | WEIGHT: 112 LBS

## 2022-02-16 DIAGNOSIS — M62.40 INVOLUNTARY MUSCLE CONTRACTIONS: ICD-10-CM

## 2022-02-16 DIAGNOSIS — R25.3 MUSCLE TWITCHING: Primary | ICD-10-CM

## 2022-02-16 LAB
ALBUMIN SERPL-MCNC: 3.4 G/DL (ref 3.4–5)
ALP SERPL-CCNC: 54 U/L (ref 40–150)
ALT SERPL W P-5'-P-CCNC: 49 U/L (ref 0–50)
ANION GAP SERPL CALCULATED.3IONS-SCNC: 6 MMOL/L (ref 3–14)
AST SERPL W P-5'-P-CCNC: 38 U/L (ref 0–45)
ATRIAL RATE - MUSE: 87 BPM
BASOPHILS # BLD AUTO: 0 10E3/UL (ref 0–0.2)
BASOPHILS NFR BLD AUTO: 1 %
BILIRUB SERPL-MCNC: 0.3 MG/DL (ref 0.2–1.3)
BUN SERPL-MCNC: 16 MG/DL (ref 7–30)
CALCIUM SERPL-MCNC: 8.7 MG/DL (ref 8.5–10.1)
CHLORIDE BLD-SCNC: 112 MMOL/L (ref 94–109)
CK SERPL-CCNC: 49 U/L (ref 30–225)
CO2 SERPL-SCNC: 22 MMOL/L (ref 20–32)
CREAT SERPL-MCNC: 0.8 MG/DL (ref 0.52–1.04)
DIASTOLIC BLOOD PRESSURE - MUSE: NORMAL MMHG
EOSINOPHIL # BLD AUTO: 0 10E3/UL (ref 0–0.7)
EOSINOPHIL NFR BLD AUTO: 1 %
ERYTHROCYTE [DISTWIDTH] IN BLOOD BY AUTOMATED COUNT: 11.9 % (ref 10–15)
GFR SERPL CREATININE-BSD FRML MDRD: >90 ML/MIN/1.73M2
GLUCOSE BLD-MCNC: 95 MG/DL (ref 70–99)
HCG SERPL QL: NEGATIVE
HCT VFR BLD AUTO: 40.2 % (ref 35–47)
HGB BLD-MCNC: 13.2 G/DL (ref 11.7–15.7)
IMM GRANULOCYTES # BLD: 0 10E3/UL
IMM GRANULOCYTES NFR BLD: 1 %
INTERPRETATION ECG - MUSE: NORMAL
LACTATE SERPL-SCNC: 2 MMOL/L (ref 0.7–2)
LYMPHOCYTES # BLD AUTO: 1 10E3/UL (ref 0.8–5.3)
LYMPHOCYTES NFR BLD AUTO: 20 %
MCH RBC QN AUTO: 30 PG (ref 26.5–33)
MCHC RBC AUTO-ENTMCNC: 32.8 G/DL (ref 31.5–36.5)
MCV RBC AUTO: 91 FL (ref 78–100)
MONOCYTES # BLD AUTO: 0.7 10E3/UL (ref 0–1.3)
MONOCYTES NFR BLD AUTO: 15 %
NEUTROPHILS # BLD AUTO: 3.2 10E3/UL (ref 1.6–8.3)
NEUTROPHILS NFR BLD AUTO: 62 %
NRBC # BLD AUTO: 0 10E3/UL
NRBC BLD AUTO-RTO: 0 /100
P AXIS - MUSE: 49 DEGREES
PLATELET # BLD AUTO: 273 10E3/UL (ref 150–450)
POTASSIUM BLD-SCNC: 3.9 MMOL/L (ref 3.4–5.3)
PR INTERVAL - MUSE: 126 MS
PROT SERPL-MCNC: 7.1 G/DL (ref 6.8–8.8)
QRS DURATION - MUSE: 68 MS
QT - MUSE: 406 MS
QTC - MUSE: 488 MS
R AXIS - MUSE: 72 DEGREES
RBC # BLD AUTO: 4.4 10E6/UL (ref 3.8–5.2)
SODIUM SERPL-SCNC: 140 MMOL/L (ref 133–144)
SYSTOLIC BLOOD PRESSURE - MUSE: NORMAL MMHG
T AXIS - MUSE: 62 DEGREES
VENTRICULAR RATE- MUSE: 87 BPM
WBC # BLD AUTO: 5.1 10E3/UL (ref 4–11)

## 2022-02-16 PROCEDURE — 85025 COMPLETE CBC W/AUTO DIFF WBC: CPT | Performed by: EMERGENCY MEDICINE

## 2022-02-16 PROCEDURE — 258N000003 HC RX IP 258 OP 636: Performed by: EMERGENCY MEDICINE

## 2022-02-16 PROCEDURE — 99285 EMERGENCY DEPT VISIT HI MDM: CPT | Mod: 25

## 2022-02-16 PROCEDURE — 96374 THER/PROPH/DIAG INJ IV PUSH: CPT

## 2022-02-16 PROCEDURE — 82550 ASSAY OF CK (CPK): CPT | Performed by: EMERGENCY MEDICINE

## 2022-02-16 PROCEDURE — 80053 COMPREHEN METABOLIC PANEL: CPT | Performed by: EMERGENCY MEDICINE

## 2022-02-16 PROCEDURE — 93005 ELECTROCARDIOGRAM TRACING: CPT

## 2022-02-16 PROCEDURE — 96361 HYDRATE IV INFUSION ADD-ON: CPT

## 2022-02-16 PROCEDURE — 36415 COLL VENOUS BLD VENIPUNCTURE: CPT | Performed by: EMERGENCY MEDICINE

## 2022-02-16 PROCEDURE — 84703 CHORIONIC GONADOTROPIN ASSAY: CPT | Performed by: EMERGENCY MEDICINE

## 2022-02-16 PROCEDURE — 83605 ASSAY OF LACTIC ACID: CPT | Performed by: EMERGENCY MEDICINE

## 2022-02-16 PROCEDURE — 250N000011 HC RX IP 250 OP 636: Performed by: EMERGENCY MEDICINE

## 2022-02-16 RX ORDER — LORAZEPAM 2 MG/ML
0.5 INJECTION INTRAMUSCULAR ONCE
Status: COMPLETED | OUTPATIENT
Start: 2022-02-16 | End: 2022-02-16

## 2022-02-16 RX ORDER — HYDROXYZINE HYDROCHLORIDE 25 MG/1
25 TABLET, FILM COATED ORAL 3 TIMES DAILY PRN
Qty: 20 TABLET | Refills: 1 | Status: SHIPPED | OUTPATIENT
Start: 2022-02-16

## 2022-02-16 RX ORDER — SODIUM CHLORIDE 9 MG/ML
INJECTION, SOLUTION INTRAVENOUS CONTINUOUS
Status: DISCONTINUED | OUTPATIENT
Start: 2022-02-16 | End: 2022-02-16 | Stop reason: HOSPADM

## 2022-02-16 RX ADMIN — LORAZEPAM 0.5 MG: 2 INJECTION INTRAMUSCULAR; INTRAVENOUS at 10:32

## 2022-02-16 RX ADMIN — SODIUM CHLORIDE 1000 ML: 9 INJECTION, SOLUTION INTRAVENOUS at 10:49

## 2022-02-16 ASSESSMENT — ENCOUNTER SYMPTOMS
TREMORS: 1
WEAKNESS: 1
HALLUCINATIONS: 1
SORE THROAT: 1
HEADACHES: 0
COUGH: 0
VOMITING: 0
FEVER: 0

## 2022-02-16 NOTE — H&P
"[facility]    History and Physical  Hospitalist       Date of Admission:  2/16/2022    Assessment & Plan   46 year old female with past medical hx of depression, who presents with weakness and muscle twitching:    Summary:    Principal Problem:    Muscle twitching -- etiology unknown, possibly aggravated by one dose of Neurontin, but given half-life would be cleared from system by now.     Active Problems:    Mild Depression        Hx of Serotonin syndrome      Hx of Myositis      Plan:  As above.  Discussed options with patient.  Labs pending, will try Atarax prn.     DVT Prophylaxis: Low Risk/Ambulatory with no VTE prophylaxis indicated  Code Status: Full Code    Disposition: Expected discharge later today    Tay Ang  Pager: [phone]  Cell Phone:  [phone]     Primary Care Physician   Brigitte Bryan    Chief Complaint   Muscle twitching, weakness    History is obtained from Patient    History of Present Illness   46 year old female depression, has been on Wellbutrin for 20 years which she says help with depression and chronic pain, and hx of Myositis -- who accidentally was taking what she thought was a PPI for acid peptic symptoms yesterday evening, but instead took a Neurontin pill (?mgs), subsequently felt like she was drunk, then then felt as though throat tightening, when to [facility] ER, got IV Ativan and Benadryl, discharged to home.  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  She mentioned earlier her right quadriceps was twitching, and started having \"baby tremors\" -- afraid it would get worse.     In ER, HR 86, BP 97/60, CMP and CBC normal, lactate borderline at 2.0.  EKG with NSR and prolonged QTc at 488 msec.     PAST MEDICAL HISTORY    Past Medical History:   Diagnosis Date     Abnormal involuntary movement 1/31/2017 "    Author: Shaheed Freeman MD Service: Hospitalist Author Type: Physician    Filed: 2015  8:10 PM Note Time: 2015 10:35 AM Status: Signed   : Shaheed Freeman MD (Physician)     Expand All Collapse All    DATE OF ADMISSION:  2015      DATE OF DISCHARGE:  2015      PRIMARY CARE PHYSICIAN:  Nancy Brady MD      CHIEF COMPLAINT:  Involuntary jerky movements.         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 Orders    EEG        Expand All Collapse All    ONE-HOUR VIDEO ELECTROENCEPHALOGRAM       ORDERING PHYSICIAN:  Lito Jasmine MD       EEG #:        [email or web address] with [facility] monitoring recording of the spell.  EEG demonstrated some sharp wave activity bilaterally that is not rhythmic.  The patient otherwise had a spell that started with tingling and d     H/O magnetic resonance imaging of brain and brain stem 3/6/2017    2017 normal brain mri     Major depressive disorder, single episode, mild (H)     chronic     Myalgia and myositis, unspecified     episodic low back and hip pain     Ruptured ovarian cyst 2011    right, requiring laparoscopy for drainage intra-abdominal hematoma     Serotonin syndrome     SSRI related (paroxetine and venlaxine), also triggered by anesthetic agents in      Supervision of other normal pregnancy      - vaginal, pergonal for infertility        PAST SURGICAL HISTORY    Past Surgical History:   Procedure Laterality Date     ASPIRATION AND CURETTAGE  3/2008    Missed AB following in vitro fertilzation/implantation     GYN SURGERY      ruptured ovarian cyst     HC REPAIR OF NASAL SEPTUM  2000     LAPAROSCOPIC APPENDECTOMY N/A 2021    Procedure: LAPAROSCOPIC APPENDECTOMY;  Surgeon:  Diane, Nazario Bustillos MD;  Location:  OR     LAPAROSCOPY  1/2011    for drainage intra-abdominal hematoma s/p ruptured right ovarian cyst        Prior to Admission Medications   Prior to Admission Medications   Prescriptions Last Dose Informant Patient Reported? Taking?   buPROPion (WELLBUTRIN XL) 150 MG 24 hr tablet  Self Yes No   Sig: Take 150 mg by mouth every morning   cyclobenzaprine (FLEXERIL) 10 MG tablet   No No   Sig: Take 1 tablet (10 mg) by mouth every 8 hours as needed for muscle spasms   cyclobenzaprine (FLEXERIL) 5 MG tablet   No No   Sig: Take 1 tablet (5 mg) by mouth 3 times daily as needed for muscle spasms   drospirenone-ethinyl estradiol (ELIS) 3-0.02 MG tablet   Yes No   Sig: Take 1 tablet by mouth daily   ondansetron (ZOFRAN-ODT) 4 MG ODT tab   No No   Sig: Take 1 tablet (4 mg) by mouth every 6 hours as needed for nausea or vomiting   oxyCODONE (ROXICODONE) 5 MG tablet   No No   Sig: Take 1 tablet (5 mg) by mouth every 3 hours as needed for moderate to severe pain   polyethylene glycol (MIRALAX) 17 GM/Dose powder   No No   Sig: Take 17 g by mouth daily as needed for constipation   senna-docusate (SENOKOT-S/PERICOLACE) 8.6-50 MG tablet   No No   Sig: Take 1 tablet by mouth 2 times daily as needed for constipation (while on pain medications)      Facility-Administered Medications: None     Allergies   Allergies   Allergen Reactions     No Clinical Screening - See Comments      Other reaction(s): Other, see comments  PN: Don't give medications that increase serotonin level, will end up in ICU.      Codeine GI Disturbance     Codeine      Other reaction(s): Other, see comments  PN:  Nausea     Paroxetine      serotonin syndrome, all seratonin drugs     Paroxetine      PN: ho serotonin syndrome per pet     Venlafaxine      serotonin syndrome       SOCIAL HISTORY    Social History     Social History Narrative    , 2 children, works as  at [employer]   (last updated 2022)       Social History     Tobacco Use     Smoking status: Never Smoker     Smokeless tobacco: Never Used   Substance Use Topics     Alcohol use: Yes     Comment: 1 per month     Drug use: No        FAMILY HISTORY    Family History   Problem Relation Age of Onset     Lipids Mother      Thyroid Disease Mother         hypothyroidism     Breast Cancer Mother 61     Depression Father      Hypertension Father      C.A.D. Father          MI age 64     Diabetes Maternal Grandmother         type 2     Diabetes Maternal Grandfather         type 2     C.A.D. Maternal Grandfather         multiple MIs, onset age 50s     Tremor Maternal Grandfather      Prostate Cancer Paternal Grandfather          age 70s     Medical History Unknown Daughter      Glaucoma No family hx of      Macular Degeneration No family hx of         Review of Systems   The 10 point Review of Systems is negative other than noted in the HPI or here.       PHYSICAL EXAM     Temp: 97.2  F (36.2  C) Temp src: Temporal BP: 100/52 Pulse: 91   Resp: 16 SpO2: 98 % O2 Device: None (Room air)    Vital Signs with Ranges  Temp:  [97.2  F (36.2  C)] 97.2  F (36.2  C)  Pulse:  [86-93] 91  Resp:  [16] 16  BP: ()/(52-72) 100/52  SpO2:  [98 %-100 %] 98 %  112 lbs 0 oz    Constitutional: Awake, alert, cooperative, no apparent distress.  Eyes: Conjunctiva and pupils examined and normal.  HEENT: Moist mucous membranes, normal dentition.  Respiratory: Clear to auscultation bilaterally, no crackles or wheezing.  Cardiovascular: Regular rate and rhythm, normal S1 and S2, and no murmur noted, no carotid bruits.  No ankle edema.   GI: Soft, non-distended, non-tender, normal bowel sounds.  Lymph/Hematologic: No anterior cervical, supraclavicular or axillary adenopathy.  Skin: No rashes, no cyanosis.   Musculoskeletal: No joint swelling, erythema or tenderness.  Neurologic: Alert, Ox3, Cranial nerves 2-12 intact, no focal weakness or  numbness  Psychiatric:  No obvious anxiety or depression.    Data   Data reviewed today:  I personally reviewed the EKG tracing showing as above.  Recent Labs   Lab 02/16/22  1050   WBC 5.1   HGB 13.2   MCV 91         POTASSIUM 3.9   CHLORIDE 112*   CO2 22   BUN 16   CR 0.80   ANIONGAP 6   CHIDI 8.7   GLC 95   ALBUMIN 3.4   PROTTOTAL 7.1   BILITOTAL 0.3   ALKPHOS 54   ALT 49   AST 38       Imaging:  No results found for this or any previous visit (from the past 24 hour(s)).

## 2022-02-16 NOTE — ED PROVIDER NOTES
"  History   Chief Complaint:  Allergic Reaction       HPI   Soheilaomi HAKAN Haroon Castañeda is a 46 year old female with a history of depression and serotonin syndrome who presents via EMS with right leg involuntary muscle shaking. Patient was prescribed gabapentin recently for some abdominal.. After taking her first dose of gabapentin at 1630 yesterday, she developed involuntary twitching to her right quadriceps region and subsequently went to [facility] yesterday due to concerns for recurrent serotonin syndrome.  She was provided Ativan and Benadryl due to concerns that she might be having an allergic reaction.  She was then discharged home.  She went to  today and was given Ativan and benadryl, and then they called an ambulance to bring her here.  She woke up this morning with an \"internal shaking feeling\". She then felt shaky and jittery after pressing on the gas pedal on her way to work. 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 Patient has seen many neurologists who suggested that her symptoms are not related to serotonin syndrome. No headache, fever, cough, chest pain, nausea, or vomiting. She drinks alcohol occasionally. No smoking.     Review of Systems   Constitutional: Negative for fever.   HENT: Positive for sore throat.    Respiratory: Negative for cough.    Cardiovascular: Negative for chest pain.   Gastrointestinal: Negative for vomiting.   Neurological: Positive for tremors and weakness. Negative for headaches.   Psychiatric/Behavioral: Positive for hallucinations.   All other systems reviewed and are negative.    Allergies:  Codeine  Paroxetine  Venlafaxine    Medications:  bupropion   cyclobenzaprine  drospirenone-ethinyl estradiol   ondansetron  oxycodone  polyethylene glycol powder  senna-docusate     Past Medical History:     Chromosome abnormality  Depression  Ovarian cyst  Serotonin syndrome      Past Surgical History:  " "  Aspiration and curettage  Ovarian cyst removal  Laparoscopic appendectomy     Family History:    Mother: lipids, thyroid disease, breast cancer  Father: depression, hypertension, CAD    Social History:  Presents alone.    Physical Exam     Patient Vitals for the past 24 hrs:   BP Temp Temp src Pulse Resp SpO2 Height Weight   02/16/22 1220 98/57 -- -- -- -- 98 % -- --   02/16/22 1200 105/58 -- -- 91 -- 100 % -- --   02/16/22 1100 97/60 -- -- 86 -- 100 % -- --   02/16/22 1050 104/64 -- -- -- -- 99 % -- --   02/16/22 1030 106/58 -- -- 88 -- 100 % -- --   02/16/22 0953 115/72 97.2  F (36.2  C) Temporal 93 16 100 % 1.626 m (5' 4\") 50.8 kg (112 lb)       Physical Exam  Nursing note and vitals reviewed.  Constitutional:  Oriented to person, place, and time. Cooperative.   HENT:   Nose:    Nose normal.   Mouth/Throat:   Facemask in place.  Eyes:    Conjunctivae normal and EOM are normal.      Pupils are equal, round, and reactive to light.   Neck:    Trachea normal.   Cardiovascular:  Normal rate, regular rhythm, normal heart sounds and normal pulses. No murmur heard.  Pulmonary/Chest:  Effort normal and breath sounds normal.   Abdominal:   Soft. Normal appearance and bowel sounds are normal.      There is no tenderness.      There is no rebound and no CVA tenderness.   Musculoskeletal:  Extremities atraumatic x 4.   Lymphadenopathy:  No cervical adenopathy.   Neurological:   Alert and oriented to person, place, and time. Normal strength.      No cranial nerve deficit or sensory deficit. GCS eye subscore is 4. GCS verbal subscore is 5. GCS motor subscore is 6.  Some involuntary twitching to the right quadriceps region.  Skin:    Skin is intact. No rash noted.   Psychiatric:   Normal mood and affect.      Emergency Department Course   ECG  ECG obtained at 1030, ECG read at 1043  Normal sinus rhythm.  Prolonged QT.   No significant change as compared to prior, dated 1/4/18.  Rate 87 bpm. OH interval 126 ms. QRS duration 68 " ms. QT/QTc 406/488 ms. P-R-T axes 49 72 62.     Laboratory:  Labs Ordered and Resulted from Time of ED Arrival to Time of ED Departure   COMPREHENSIVE METABOLIC PANEL - Abnormal       Result Value    Sodium 140      Potassium 3.9      Chloride 112 (*)     Carbon Dioxide (CO2) 22      Anion Gap 6      Urea Nitrogen 16      Creatinine 0.80      Calcium 8.7      Glucose 95      Alkaline Phosphatase 54      AST 38      ALT 49      Protein Total 7.1      Albumin 3.4      Bilirubin Total 0.3      GFR Estimate >90     LACTIC ACID WHOLE BLOOD - Normal    Lactic Acid 2.0     HCG QUALITATIVE PREGNANCY - Normal    hCG Serum Qualitative Negative     CK TOTAL - Normal    CK 49     CBC WITH PLATELETS AND DIFFERENTIAL    WBC Count 5.1      RBC Count 4.40      Hemoglobin 13.2      Hematocrit 40.2      MCV 91      MCH 30.0      MCHC 32.8      RDW 11.9      Platelet Count 273      % Neutrophils 62      % Lymphocytes 20      % Monocytes 15      % Eosinophils 1      % Basophils 1      % Immature Granulocytes 1      NRBCs per 100 WBC 0      Absolute Neutrophils 3.2      Absolute Lymphocytes 1.0      Absolute Monocytes 0.7      Absolute Eosinophils 0.0      Absolute Basophils 0.0      Absolute Immature Granulocytes 0.0      Absolute NRBCs 0.0          Emergency Department Course:     Reviewed:  I reviewed nursing notes, vitals, past medical history, Care Everywhere and [facility]    Assessments:  1001 I obtained history and examined the patient as noted above.   1320 I rechecked the patient and explained findings.     Consults:  1050 I spoke to Dr. Espinoza who will evaluate the patient at bedside.  1215 I spoke to Dr. Espinoza the second time.    Interventions:  1032 Ativan 0.5 mg IV  1049 Bolus 1L IV    Disposition:  The patient was discharged to home.     Impression & Plan     Medical Decision Making:  This is a 46-year-old female who came in by ambulance from urgent care due to concerns that she might have recurrent serotonin syndrome.  I  reviewed her records from [facility] the ER from yesterday as well as many of her previous records here.  I do not believe that her current presentation is consistent with serotonin syndrome, as she does not have any clonus on exam, and her vital signs are normal.  While I was seeing her, she had a very short full body twitching episode as well.  She did not have any alteration of her consciousness during that episode though.  She was provided IV fluids as well as an IV dose of Ativan.  My initial thought was that she would require admission to the hospital, as I did not feel that she would be comfortable going home.  Therefore I spoke with Dr. Espinoza to come and see her and bring her into the hospital.  However she actually prefers to go home, and he is going to provide her a prescription for Atarax as well as a note for work.  We both stressed the importance of follow-up with her neurologist and primary physician and certainly returning here with any concerns or worsening symptoms.      Diagnosis:    ICD-10-CM    1. Muscle twitching  R25.3 hydrOXYzine (ATARAX) 25 MG tablet       Discharge Medications:  Current Discharge Medication List      START taking these medications    Details   hydrOXYzine (ATARAX) 25 MG tablet Take 1 tablet (25 mg) by mouth 3 times daily as needed for other (pain or muscle twitching)  Qty: 20 tablet, Refills: 1    Associated Diagnoses: Muscle twitching             Scribe Disclosure:  I, Cal Morris, am serving as a scribe at 10:01 AM on 2/16/2022 to document services personally performed by Matty Hernandez MD based on my observations and the provider's statements to me.            Matty Hernandez MD  02/16/22 [id number]

## 2022-02-16 NOTE — ED TRIAGE NOTES
"Pt took 1 dose gabapentine last night at 1600 (unknown dose) At 1700 started having throat tightening and lips tingling.  Went to [facility], given benadryl and ativan. Discharged home.  Today felt not better, went to [facility] clinic, given 50mg benadryl and 1 mg ativan IV for \"Seritonin Syndrome shaking\"   "

## 2022-02-16 NOTE — DISCHARGE SUMMARY
"[facility]    Discharge Summary  Hospitalist    Date of Admission:  2/16/2022  Date of Discharge:  2/16/2022  Discharging Provider: Tay Ang MD    Discharge Diagnoses   Principal Problem:    Muscle twitching -- etiology unknown, possibly aggravated by one dose of Neurontin, but given half-life would be cleared from system by now.      Active Problems:    Mild Depression         Hx of Serotonin syndrome       Hx of Myositis    History of Present Illness   46 year old female depression, has been on Wellbutrin for 20 years which she says help with depression and chronic pain, and hx of Myositis -- who accidentally was taking what she thought was a PPI for acid peptic symptoms yesterday evening, but instead took a Neurontin pill (?mgs), subsequently felt like she was drunk, then then felt as though throat tightening, when to [facility] ER, got IV Ativan and Benadryl, discharged to home.  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  She mentioned earlier her right quadriceps was twitching, and started having \"baby tremors\" -- afraid it would get worse.      In ER, HR 86, BP 97/60, CMP and CBC normal, lactate borderline at 2.0.  EKG with NSR and prolonged QTc at 488 msec.     Hospital Course   Observed in ER for 4 hours, and given borderline lactate was hydrate with NS, and was taking oral fluids well by time of discharge.     Will discharge to home -- discussed with patient and .  Advised minimizing Flexeril and Oxycodone -- she says she no longer takes those.   If concerned about Serotonin Syndrome discussed stopping Wellbutrin, but she preferred to keep taking it.  Practically speaking, I doubt the neurontin pill she took >20 hours ago is contributing to her symptoms any longer, but will use Atarax 25 mg q4hr prn, and may return to work on " 2/21/22, or sooner if better.  She will call if further problems.     Tay Ang MD  Pager: [phone]  Cell Phone:  [phone]       Significant Results and Procedures   As above    Pending Results   These results will be followed up by Dr. Ang  Unresulted Labs Ordered in the Past 30 Days of this Admission     No orders found from 1/17/2022 to 2/17/2022.          Code Status   Full Code       Primary Care Physician   Brigitte Bryan    Physical Exam   Temp: 97.2  F (36.2  C) Temp src: Temporal BP: 100/52 Pulse: 91   Resp: 16 SpO2: 98 % O2 Device: None (Room air)    Vitals:    02/16/22 0953   Weight: 50.8 kg (112 lb)     Vital Signs with Ranges  Temp:  [97.2  F (36.2  C)] 97.2  F (36.2  C)  Pulse:  [86-93] 91  Resp:  [16] 16  BP: ()/(52-72) 100/52  SpO2:  [98 %-100 %] 98 %  No intake/output data recorded.    Exam on discharge:   Lungs clear  CV with reg S1S2  Neuro -- alert, Ox3, no tremors.     Discharge Disposition   Discharged to home  Condition at discharge: Stable    Consultations This Hospital Stay   None    Time Spent on this Encounter   I spent a total of 35 minutes discharging this patient.     Discharge Orders      Reason for your hospital stay    Weakness and muscle twitching.     Follow-up and recommended labs and tests     Follow up with primary care provider, Brigitte Bryan, in 1 week.      Call Dr. Ang if any medical questions at Cell Phone [phone].     Activity    Your activity upon discharge: activity as tolerated. May return to work on Monday, 2/21/22.     Diet    Follow this diet upon discharge: Regular diet.  Take in adequate fluids (at least 12 oz glasses a day).     Discharge Medications   Discharge Medication List as of 2/16/2022  1:59 PM      START taking these medications    Details   hydrOXYzine (ATARAX) 25 MG tablet Take 1 tablet (25 mg) by mouth 3 times daily as needed for other (pain or muscle twitching), Disp-20 tablet, R-1, E-Prescribe         CONTINUE  these medications which have NOT CHANGED    Details   buPROPion (WELLBUTRIN XL) 150 MG 24 hr tablet Take 150 mg by mouth every morning, Historical      cyclobenzaprine (FLEXERIL) 5 MG tablet Take 1 tablet (5 mg) by mouth 3 times daily as needed for muscle spasms, Disp-20 tablet, R-0, E-Prescribe      drospirenone-ethinyl estradiol (ELIS) 3-0.02 MG tablet Take 1 tablet by mouth daily, Historical      ondansetron (ZOFRAN-ODT) 4 MG ODT tab Take 1 tablet (4 mg) by mouth every 6 hours as needed for nausea or vomiting, Disp-10 tablet, R-0, E-Prescribe      polyethylene glycol (MIRALAX) 17 GM/Dose powder Take 17 g by mouth daily as needed for constipation, Disp-510 g, R-0, E-Prescribe      senna-docusate (SENOKOT-S/PERICOLACE) 8.6-50 MG tablet Take 1 tablet by mouth 2 times daily as needed for constipation (while on pain medications), Disp-10 tablet, R-0, E-Prescribe         STOP taking these medications       oxyCODONE (ROXICODONE) 5 MG tablet Comments:   Reason for Stopping:             Allergies   Allergies   Allergen Reactions     No Clinical Screening - See Comments      Other reaction(s): Other, see comments  PN: Don't give medications that increase serotonin level, will end up in ICU.      Codeine GI Disturbance     Codeine      Other reaction(s): Other, see comments  PN:  Nausea     Paroxetine      serotonin syndrome, all seratonin drugs     Paroxetine      PN: ho serotonin syndrome per pet     Venlafaxine      serotonin syndrome     Data   Most Recent 3 CBC's:Recent Labs   Lab Test 02/16/22  1050 08/24/21  0937 08/23/21  1637   WBC 5.1 9.8 12.8*   HGB 13.2 13.1 12.6   MCV 91 89 91    246 229      Most Recent 3 BMP's:  Recent Labs   Lab Test 02/16/22  1050 08/25/21  0714 08/24/21  0937    141 137   POTASSIUM 3.9 3.8 3.8   CHLORIDE 112* 115* 109   CO2 22 24 19*   BUN 16 4* 6*   CR 0.80 0.92 0.77   ANIONGAP 6 2* 9   CHIDI 8.7 7.8* 8.6   GLC 95 102* 95     Most Recent 2 LFT's:  Recent Labs   Lab Test  02/16/22  1050 08/25/21  0714   AST 38 15   ALT 49 27   ALKPHOS 54 33*   BILITOTAL 0.3 0.4     Most Recent INR's and Anticoagulation Dosing History:  Anticoagulation Dose History     Recent Dosing and Labs Latest Ref Rng & Units 8/22/2008    INR 0.86 - 1.14 1.04        Most Recent 3 Troponin's:  Recent Labs   Lab Test 05/15/19  1328 05/15/19  1054 01/04/18  1300   TROPI <0.015 <0.015 <0.015     Most Recent Cholesterol Panel:No lab results found.  Most Recent 6 Bacteria Isolates From Any Culture (See EPIC Reports for Culture Details):No lab results found.  Most Recent TSH, T4 and A1c Labs:  Recent Labs   Lab Test 03/13/17  1450 07/02/15  1122   TSH 0.75 0.88   T4  --  0.82

## 2022-02-16 NOTE — PROGRESS NOTES
To Whom it May Concern,      Dae Castañeda was admitted to [facility] on 2/16/2022 and discharged on 2/16/2022.  The patient is medically stable to return to work on Monday, 2/21/2022, or sooner if better.      If questions, please call Dr. Espinoza at Cell Phone [phone].      Sincerely,      Dr. Tay Espinoza  [facility]  Phone [phone]

## 2022-02-16 NOTE — ED NOTES
Bed: ED02  Expected date:   Expected time:   Means of arrival:   Comments:   F 46 tremors ETA 4506

## 2022-02-18 ENCOUNTER — HOSPITAL ENCOUNTER (OUTPATIENT)
Facility: CLINIC | Age: 47
End: 2022-02-18
Attending: INTERNAL MEDICINE | Admitting: INTERNAL MEDICINE
Payer: COMMERCIAL

## 2022-02-18 DIAGNOSIS — Z11.59 ENCOUNTER FOR SCREENING FOR OTHER VIRAL DISEASES: Primary | ICD-10-CM

## 2022-04-10 ENCOUNTER — HEALTH MAINTENANCE LETTER (OUTPATIENT)
Age: 47
End: 2022-04-10

## 2022-08-19 NOTE — TELEPHONE ENCOUNTER
Bupropion        Last Written Prescription Date: 11/1/16  Last Fill Quantity: 180; # refills: 1  Last Office Visit with [facility], [facility] or  Health prescribing provider:  2/8/17        Last PHQ-9 score on record=   PHQ-9 SCORE 3/29/2017   Total Score MyChart 2 (Minimal depression)   Total Score -       Lab Results   Component Value Date    AST 18 01/23/2017     Lab Results   Component Value Date    ALT 19 01/23/2017     Sonali Torres CMA    
Refill approved through [facility] protocol.  Loren Loera RN  [facility]  [phone]    
Face Mask

## 2022-08-22 ENCOUNTER — TELEPHONE (OUTPATIENT)
Dept: NEUROLOGY | Facility: CLINIC | Age: 47
End: 2022-08-22

## 2022-08-22 NOTE — TELEPHONE ENCOUNTER
[facility] Call Center    Phone Message    May a detailed message be left on voicemail: yes     Reason for Call: Other: Caller would be a new patient. Caller requests a call back to discuss what doctor would be the best of the best for her (per patient statement).  She has been seeing neurologist at [facility] with no help.  Patient requests a call back to discuss symptoms and direction for best provider     Action Taken: Message routed to:  [facility] ([facility] PAOLA Mercer    Travel Screening: Not Applicable

## 2022-08-22 NOTE — TELEPHONE ENCOUNTER
I returned call to patient. I let her know I had a couple providers review her chart including Dr. Atwood whom she saw in 2017.     Per chart review she has seen the following neurologists:     Dr Raya April 2022   Dr Atwood NATACHA 2017   Dr Radfrod [facility] 2017   Dr Jasmine [facility] 2017   Dr Donny Escalante [facility] notes not in chart but mentioned he was her outpatient neurologist     Plus [facility] - though no notes available to review    Team recommends her to return to Dr Salcedo for further care. Per pt Dr. Salcedo referred her to [facility]. I let her know she could return to [facility]. She said they also recommended she see [facility]. They could help her with referral to [facility] neurology. She could let them know she has been seen with the [facility] in 2017.     I also reviewed our new patient visits are booking into January/February. Pt will call [facility] for referral to [facility].     Munira GANDARA

## 2022-08-22 NOTE — TELEPHONE ENCOUNTER
I called pt to discuss her question about possible new pt visit. She said for past 20 years she has had serotonin syndrome on/off. Usually medication induced but once exercise induced and now triggered by vibration. Vibrations are triggering involuntary movements - internal vibrations. She notices this is getting worse. Vibrations from car/radio will trigger her symptoms. She can walk and be okay but walking for a full day activity can trigger tremor/motions.     She had head/neck MRI which came back normal. She had EMG which was normal. She had some lab work came back abnormal; the AMA positive 1640 ratio. Pt has been seen at [facility] this summer. They recommended she see [facility] or the [facility]. At [facility] saw Dr. Yang movement disorder specialist; and a neuromuscular specialist did not recall the name.     Will update our team to see if we can help. We will call her to let her know if we can schedule and if so with what department.     Munira GANDARA

## 2022-10-04 ENCOUNTER — ANESTHESIA EVENT (OUTPATIENT)
Dept: SURGERY | Facility: CLINIC | Age: 47
End: 2022-10-04
Payer: COMMERCIAL

## 2022-10-04 RX ORDER — LORAZEPAM 0.5 MG/1
0.5 TABLET ORAL 2 TIMES DAILY PRN
COMMUNITY

## 2022-10-04 ASSESSMENT — LIFESTYLE VARIABLES: TOBACCO_USE: 0

## 2022-10-05 ENCOUNTER — ANESTHESIA (OUTPATIENT)
Dept: SURGERY | Facility: CLINIC | Age: 47
End: 2022-10-05
Payer: COMMERCIAL

## 2022-10-05 ENCOUNTER — HOSPITAL ENCOUNTER (OUTPATIENT)
Facility: CLINIC | Age: 47
Discharge: HOME | End: 2022-10-06
Attending: OBSTETRICS & GYNECOLOGY | Admitting: OBSTETRICS & GYNECOLOGY
Payer: COMMERCIAL

## 2022-10-05 DIAGNOSIS — Z90.710 HISTORY OF TOTAL HYSTERECTOMY: Primary | ICD-10-CM

## 2022-10-05 LAB
ANION GAP SERPL CALCULATED.3IONS-SCNC: 5 MMOL/L (ref 3–14)
B-HCG SERPL-ACNC: <1 IU/L (ref 0–5)
BASOPHILS # BLD AUTO: 0 10E3/UL (ref 0–0.2)
BASOPHILS NFR BLD AUTO: 0 %
BUN SERPL-MCNC: 10 MG/DL (ref 7–30)
CALCIUM SERPL-MCNC: 8.7 MG/DL (ref 8.5–10.1)
CHLORIDE BLD-SCNC: 104 MMOL/L (ref 94–109)
CO2 SERPL-SCNC: 26 MMOL/L (ref 20–32)
CREAT SERPL-MCNC: 0.67 MG/DL (ref 0.52–1.04)
EOSINOPHIL # BLD AUTO: 0 10E3/UL (ref 0–0.7)
EOSINOPHIL NFR BLD AUTO: 0 %
ERYTHROCYTE [DISTWIDTH] IN BLOOD BY AUTOMATED COUNT: 11.4 % (ref 10–15)
GFR SERPL CREATININE-BSD FRML MDRD: >90 ML/MIN/1.73M2
GLUCOSE BLD-MCNC: 163 MG/DL (ref 70–99)
GLUCOSE BLDC GLUCOMTR-MCNC: 126 MG/DL (ref 70–99)
HBA1C MFR BLD: 5.3 % (ref 0–5.6)
HCT VFR BLD AUTO: 38.3 % (ref 35–47)
HGB BLD-MCNC: 13 G/DL (ref 11.7–15.7)
HGB BLD-MCNC: 13.2 G/DL (ref 11.7–15.7)
IMM GRANULOCYTES # BLD: 0.1 10E3/UL
IMM GRANULOCYTES NFR BLD: 1 %
LYMPHOCYTES # BLD AUTO: 0.5 10E3/UL (ref 0.8–5.3)
LYMPHOCYTES NFR BLD AUTO: 4 %
MAGNESIUM SERPL-MCNC: 2 MG/DL (ref 1.6–2.3)
MCH RBC QN AUTO: 30.3 PG (ref 26.5–33)
MCHC RBC AUTO-ENTMCNC: 34.5 G/DL (ref 31.5–36.5)
MCV RBC AUTO: 88 FL (ref 78–100)
MONOCYTES # BLD AUTO: 0.3 10E3/UL (ref 0–1.3)
MONOCYTES NFR BLD AUTO: 2 %
NEUTROPHILS # BLD AUTO: 11.5 10E3/UL (ref 1.6–8.3)
NEUTROPHILS NFR BLD AUTO: 93 %
NRBC # BLD AUTO: 0 10E3/UL
NRBC BLD AUTO-RTO: 0 /100
PHOSPHATE SERPL-MCNC: 2.2 MG/DL (ref 2.5–4.5)
PLATELET # BLD AUTO: 243 10E3/UL (ref 150–450)
POTASSIUM BLD-SCNC: 3.7 MMOL/L (ref 3.4–5.3)
RBC # BLD AUTO: 4.36 10E6/UL (ref 3.8–5.2)
SODIUM SERPL-SCNC: 135 MMOL/L (ref 133–144)
TSH SERPL DL<=0.005 MIU/L-ACNC: 0.53 MU/L (ref 0.4–4)
WBC # BLD AUTO: 12.3 10E3/UL (ref 4–11)

## 2022-10-05 PROCEDURE — 250N000009 HC RX 250: Performed by: NURSE ANESTHETIST, CERTIFIED REGISTERED

## 2022-10-05 PROCEDURE — 250N000013 HC RX MED GY IP 250 OP 250 PS 637: Performed by: OBSTETRICS & GYNECOLOGY

## 2022-10-05 PROCEDURE — 250N000013 HC RX MED GY IP 250 OP 250 PS 637: Performed by: PSYCHIATRY & NEUROLOGY

## 2022-10-05 PROCEDURE — 250N000013 HC RX MED GY IP 250 OP 250 PS 637: Performed by: PHYSICIAN ASSISTANT

## 2022-10-05 PROCEDURE — 36415 COLL VENOUS BLD VENIPUNCTURE: CPT | Performed by: PHYSICIAN ASSISTANT

## 2022-10-05 PROCEDURE — 258N000003 HC RX IP 258 OP 636: Performed by: NURSE ANESTHETIST, CERTIFIED REGISTERED

## 2022-10-05 PROCEDURE — 258N000003 HC RX IP 258 OP 636: Performed by: SURGERY

## 2022-10-05 PROCEDURE — 250N000011 HC RX IP 250 OP 636: Performed by: OBSTETRICS & GYNECOLOGY

## 2022-10-05 PROCEDURE — 88307 TISSUE EXAM BY PATHOLOGIST: CPT | Mod: 26 | Performed by: PATHOLOGY

## 2022-10-05 PROCEDURE — 999N000141 HC STATISTIC PRE-PROCEDURE NURSING ASSESSMENT: Performed by: OBSTETRICS & GYNECOLOGY

## 2022-10-05 PROCEDURE — 84702 CHORIONIC GONADOTROPIN TEST: CPT | Performed by: PHYSICIAN ASSISTANT

## 2022-10-05 PROCEDURE — 84443 ASSAY THYROID STIM HORMONE: CPT | Performed by: PHYSICIAN ASSISTANT

## 2022-10-05 PROCEDURE — 80048 BASIC METABOLIC PNL TOTAL CA: CPT | Performed by: PHYSICIAN ASSISTANT

## 2022-10-05 PROCEDURE — 83735 ASSAY OF MAGNESIUM: CPT | Performed by: PHYSICIAN ASSISTANT

## 2022-10-05 PROCEDURE — 710N000009 HC RECOVERY PHASE 1, LEVEL 1, PER MIN: Performed by: OBSTETRICS & GYNECOLOGY

## 2022-10-05 PROCEDURE — 272N000001 HC OR GENERAL SUPPLY STERILE: Performed by: OBSTETRICS & GYNECOLOGY

## 2022-10-05 PROCEDURE — 85018 HEMOGLOBIN: CPT | Performed by: PHYSICIAN ASSISTANT

## 2022-10-05 PROCEDURE — 250N000011 HC RX IP 250 OP 636: Performed by: NURSE ANESTHETIST, CERTIFIED REGISTERED

## 2022-10-05 PROCEDURE — 83036 HEMOGLOBIN GLYCOSYLATED A1C: CPT | Performed by: PHYSICIAN ASSISTANT

## 2022-10-05 PROCEDURE — 250N000025 HC SEVOFLURANE, PER MIN: Performed by: OBSTETRICS & GYNECOLOGY

## 2022-10-05 PROCEDURE — 84100 ASSAY OF PHOSPHORUS: CPT | Performed by: PHYSICIAN ASSISTANT

## 2022-10-05 PROCEDURE — 250N000011 HC RX IP 250 OP 636: Performed by: SURGERY

## 2022-10-05 PROCEDURE — 250N000011 HC RX IP 250 OP 636: Performed by: PHYSICIAN ASSISTANT

## 2022-10-05 PROCEDURE — 360N000080 HC SURGERY LEVEL 7, PER MIN: Performed by: OBSTETRICS & GYNECOLOGY

## 2022-10-05 PROCEDURE — 82962 GLUCOSE BLOOD TEST: CPT

## 2022-10-05 PROCEDURE — 88307 TISSUE EXAM BY PATHOLOGIST: CPT | Mod: TC | Performed by: OBSTETRICS & GYNECOLOGY

## 2022-10-05 PROCEDURE — 370N000017 HC ANESTHESIA TECHNICAL FEE, PER MIN: Performed by: OBSTETRICS & GYNECOLOGY

## 2022-10-05 PROCEDURE — 99223 1ST HOSP IP/OBS HIGH 75: CPT | Performed by: PHYSICIAN ASSISTANT

## 2022-10-05 PROCEDURE — 250N000013 HC RX MED GY IP 250 OP 250 PS 637: Performed by: SURGERY

## 2022-10-05 RX ORDER — AMOXICILLIN 250 MG
1-2 CAPSULE ORAL 2 TIMES DAILY
Qty: 30 TABLET | Refills: 0 | Status: SHIPPED | OUTPATIENT
Start: 2022-10-05 | End: 2024-05-07

## 2022-10-05 RX ORDER — HYDROMORPHONE HYDROCHLORIDE 2 MG/1
4 TABLET ORAL EVERY 4 HOURS PRN
Status: CANCELLED | OUTPATIENT
Start: 2022-10-05

## 2022-10-05 RX ORDER — ONDANSETRON 4 MG/1
4-8 TABLET, ORALLY DISINTEGRATING ORAL EVERY 8 HOURS PRN
Qty: 10 TABLET | Refills: 0 | Status: CANCELLED | OUTPATIENT
Start: 2022-10-05

## 2022-10-05 RX ORDER — ONDANSETRON 2 MG/ML
4 INJECTION INTRAMUSCULAR; INTRAVENOUS EVERY 30 MIN PRN
Status: DISCONTINUED | OUTPATIENT
Start: 2022-10-05 | End: 2022-10-05 | Stop reason: HOSPADM

## 2022-10-05 RX ORDER — HYDROMORPHONE HYDROCHLORIDE 2 MG/1
2 TABLET ORAL EVERY 4 HOURS PRN
Status: CANCELLED | OUTPATIENT
Start: 2022-10-05

## 2022-10-05 RX ORDER — HYDROMORPHONE HYDROCHLORIDE 2 MG/1
4 TABLET ORAL EVERY 4 HOURS PRN
Status: DISCONTINUED | OUTPATIENT
Start: 2022-10-05 | End: 2022-10-06 | Stop reason: HOSPADM

## 2022-10-05 RX ORDER — IBUPROFEN 400 MG/1
800 TABLET, FILM COATED ORAL ONCE
Status: DISCONTINUED | OUTPATIENT
Start: 2022-10-05 | End: 2022-10-05

## 2022-10-05 RX ORDER — HYDROMORPHONE HCL IN WATER/PF 6 MG/30 ML
0.2 PATIENT CONTROLLED ANALGESIA SYRINGE INTRAVENOUS EVERY 5 MIN PRN
Status: DISCONTINUED | OUTPATIENT
Start: 2022-10-05 | End: 2022-10-05 | Stop reason: HOSPADM

## 2022-10-05 RX ORDER — HYDROXYZINE HYDROCHLORIDE 25 MG/1
25 TABLET, FILM COATED ORAL
Status: DISCONTINUED | OUTPATIENT
Start: 2022-10-05 | End: 2022-10-05

## 2022-10-05 RX ORDER — LIDOCAINE 40 MG/G
CREAM TOPICAL
Status: DISCONTINUED | OUTPATIENT
Start: 2022-10-05 | End: 2022-10-05

## 2022-10-05 RX ORDER — ACETAMINOPHEN 325 MG/1
650 TABLET ORAL EVERY 4 HOURS PRN
Qty: 100 TABLET | Refills: 0 | Status: SHIPPED | OUTPATIENT
Start: 2022-10-05 | End: 2024-05-07

## 2022-10-05 RX ORDER — HYDROMORPHONE HCL IN WATER/PF 6 MG/30 ML
0.2 PATIENT CONTROLLED ANALGESIA SYRINGE INTRAVENOUS
Status: DISCONTINUED | OUTPATIENT
Start: 2022-10-05 | End: 2022-10-06 | Stop reason: HOSPADM

## 2022-10-05 RX ORDER — NALOXONE HYDROCHLORIDE 0.4 MG/ML
0.4 INJECTION, SOLUTION INTRAMUSCULAR; INTRAVENOUS; SUBCUTANEOUS
Status: DISCONTINUED | OUTPATIENT
Start: 2022-10-05 | End: 2022-10-06 | Stop reason: HOSPADM

## 2022-10-05 RX ORDER — FENTANYL CITRATE 0.05 MG/ML
25 INJECTION, SOLUTION INTRAMUSCULAR; INTRAVENOUS EVERY 5 MIN PRN
Status: DISCONTINUED | OUTPATIENT
Start: 2022-10-05 | End: 2022-10-05 | Stop reason: HOSPADM

## 2022-10-05 RX ORDER — ACETAMINOPHEN 325 MG/1
650 TABLET ORAL EVERY 6 HOURS PRN
Status: CANCELLED | OUTPATIENT
Start: 2022-10-05

## 2022-10-05 RX ORDER — ONDANSETRON 2 MG/ML
INJECTION INTRAMUSCULAR; INTRAVENOUS PRN
Status: DISCONTINUED | OUTPATIENT
Start: 2022-10-05 | End: 2022-10-05

## 2022-10-05 RX ORDER — LIDOCAINE 40 MG/G
CREAM TOPICAL
Status: CANCELLED | OUTPATIENT
Start: 2022-10-05

## 2022-10-05 RX ORDER — ACETAMINOPHEN 325 MG/1
975 TABLET ORAL ONCE
Status: COMPLETED | OUTPATIENT
Start: 2022-10-05 | End: 2022-10-05

## 2022-10-05 RX ORDER — FAMOTIDINE 20 MG/1
20 TABLET, FILM COATED ORAL 2 TIMES DAILY
Status: CANCELLED | OUTPATIENT
Start: 2022-10-05

## 2022-10-05 RX ORDER — NALOXONE HYDROCHLORIDE 0.4 MG/ML
0.2 INJECTION, SOLUTION INTRAMUSCULAR; INTRAVENOUS; SUBCUTANEOUS
Status: DISCONTINUED | OUTPATIENT
Start: 2022-10-05 | End: 2022-10-06 | Stop reason: HOSPADM

## 2022-10-05 RX ORDER — IBUPROFEN 600 MG/1
600 TABLET, FILM COATED ORAL EVERY 6 HOURS PRN
Status: CANCELLED | OUTPATIENT
Start: 2022-10-05

## 2022-10-05 RX ORDER — PROPOFOL 10 MG/ML
INJECTION, EMULSION INTRAVENOUS PRN
Status: DISCONTINUED | OUTPATIENT
Start: 2022-10-05 | End: 2022-10-05

## 2022-10-05 RX ORDER — HYDROMORPHONE HCL IN WATER/PF 6 MG/30 ML
0.2 PATIENT CONTROLLED ANALGESIA SYRINGE INTRAVENOUS
Status: CANCELLED | OUTPATIENT
Start: 2022-10-05

## 2022-10-05 RX ORDER — AMOXICILLIN 250 MG
1-2 CAPSULE ORAL 2 TIMES DAILY
Qty: 30 TABLET | Refills: 0 | Status: CANCELLED | OUTPATIENT
Start: 2022-10-05

## 2022-10-05 RX ORDER — HYDROXYZINE HYDROCHLORIDE 50 MG/ML
25 INJECTION, SOLUTION INTRAMUSCULAR ONCE
Status: COMPLETED | OUTPATIENT
Start: 2022-10-05 | End: 2022-10-05

## 2022-10-05 RX ORDER — MEPERIDINE HYDROCHLORIDE 25 MG/ML
12.5 INJECTION INTRAMUSCULAR; INTRAVENOUS; SUBCUTANEOUS
Status: CANCELLED | OUTPATIENT
Start: 2022-10-05

## 2022-10-05 RX ORDER — HYDROXYZINE HYDROCHLORIDE 25 MG/1
25 TABLET, FILM COATED ORAL 3 TIMES DAILY PRN
Status: DISCONTINUED | OUTPATIENT
Start: 2022-10-05 | End: 2022-10-05

## 2022-10-05 RX ORDER — HYDROXYZINE HYDROCHLORIDE 25 MG/1
25 TABLET, FILM COATED ORAL EVERY 6 HOURS PRN
Status: DISCONTINUED | OUTPATIENT
Start: 2022-10-05 | End: 2022-10-05

## 2022-10-05 RX ORDER — LORAZEPAM 0.5 MG/1
0.5 TABLET ORAL 2 TIMES DAILY PRN
Status: DISCONTINUED | OUTPATIENT
Start: 2022-10-05 | End: 2022-10-05

## 2022-10-05 RX ORDER — HYDROMORPHONE HCL IN WATER/PF 6 MG/30 ML
0.4 PATIENT CONTROLLED ANALGESIA SYRINGE INTRAVENOUS
Status: DISCONTINUED | OUTPATIENT
Start: 2022-10-05 | End: 2022-10-06 | Stop reason: HOSPADM

## 2022-10-05 RX ORDER — BUPIVACAINE HYDROCHLORIDE 2.5 MG/ML
INJECTION, SOLUTION INFILTRATION; PERINEURAL PRN
Status: DISCONTINUED | OUTPATIENT
Start: 2022-10-05 | End: 2022-10-05 | Stop reason: HOSPADM

## 2022-10-05 RX ORDER — UREA 10 %
500 LOTION (ML) TOPICAL 2 TIMES DAILY
Status: DISCONTINUED | OUTPATIENT
Start: 2022-10-05 | End: 2022-10-06 | Stop reason: HOSPADM

## 2022-10-05 RX ORDER — ONDANSETRON 4 MG/1
4 TABLET, ORALLY DISINTEGRATING ORAL EVERY 30 MIN PRN
Status: DISCONTINUED | OUTPATIENT
Start: 2022-10-05 | End: 2022-10-05 | Stop reason: HOSPADM

## 2022-10-05 RX ORDER — HYDROXYZINE HYDROCHLORIDE 25 MG/1
25 TABLET, FILM COATED ORAL EVERY 6 HOURS PRN
Qty: 30 TABLET | Refills: 0 | Status: SHIPPED | OUTPATIENT
Start: 2022-10-05

## 2022-10-05 RX ORDER — HYDROMORPHONE HYDROCHLORIDE 2 MG/1
2 TABLET ORAL EVERY 4 HOURS PRN
Status: DISCONTINUED | OUTPATIENT
Start: 2022-10-05 | End: 2022-10-06 | Stop reason: HOSPADM

## 2022-10-05 RX ORDER — CEFAZOLIN SODIUM/WATER 2 G/20 ML
2 SYRINGE (ML) INTRAVENOUS
Status: COMPLETED | OUTPATIENT
Start: 2022-10-05 | End: 2022-10-05

## 2022-10-05 RX ORDER — SODIUM CHLORIDE, SODIUM LACTATE, POTASSIUM CHLORIDE, CALCIUM CHLORIDE 600; 310; 30; 20 MG/100ML; MG/100ML; MG/100ML; MG/100ML
INJECTION, SOLUTION INTRAVENOUS CONTINUOUS
Status: DISCONTINUED | OUTPATIENT
Start: 2022-10-05 | End: 2022-10-05 | Stop reason: HOSPADM

## 2022-10-05 RX ORDER — FAMOTIDINE 20 MG/1
20 TABLET, FILM COATED ORAL 2 TIMES DAILY
Status: DISCONTINUED | OUTPATIENT
Start: 2022-10-05 | End: 2022-10-06 | Stop reason: HOSPADM

## 2022-10-05 RX ORDER — PROPOFOL 10 MG/ML
INJECTION, EMULSION INTRAVENOUS CONTINUOUS PRN
Status: DISCONTINUED | OUTPATIENT
Start: 2022-10-05 | End: 2022-10-05

## 2022-10-05 RX ORDER — HYDROMORPHONE HYDROCHLORIDE 2 MG/1
2 TABLET ORAL
Status: DISCONTINUED | OUTPATIENT
Start: 2022-10-05 | End: 2022-10-05

## 2022-10-05 RX ORDER — ACETAMINOPHEN 325 MG/1
650 TABLET ORAL EVERY 4 HOURS PRN
Qty: 100 TABLET | Refills: 0 | Status: CANCELLED | OUTPATIENT
Start: 2022-10-05

## 2022-10-05 RX ORDER — ONDANSETRON 2 MG/ML
4 INJECTION INTRAMUSCULAR; INTRAVENOUS EVERY 6 HOURS PRN
Status: CANCELLED | OUTPATIENT
Start: 2022-10-05

## 2022-10-05 RX ORDER — OXYCODONE HYDROCHLORIDE 5 MG/1
5 TABLET ORAL EVERY 4 HOURS PRN
Status: DISCONTINUED | OUTPATIENT
Start: 2022-10-05 | End: 2022-10-05 | Stop reason: HOSPADM

## 2022-10-05 RX ORDER — KETOROLAC TROMETHAMINE 15 MG/ML
30 INJECTION, SOLUTION INTRAMUSCULAR; INTRAVENOUS EVERY 6 HOURS
Status: DISCONTINUED | OUTPATIENT
Start: 2022-10-05 | End: 2022-10-06 | Stop reason: HOSPADM

## 2022-10-05 RX ORDER — ONDANSETRON 4 MG/1
4 TABLET, ORALLY DISINTEGRATING ORAL EVERY 6 HOURS PRN
Status: DISCONTINUED | OUTPATIENT
Start: 2022-10-05 | End: 2022-10-06 | Stop reason: HOSPADM

## 2022-10-05 RX ORDER — IBUPROFEN 600 MG/1
600 TABLET, FILM COATED ORAL EVERY 6 HOURS PRN
Status: DISCONTINUED | OUTPATIENT
Start: 2022-10-05 | End: 2022-10-06 | Stop reason: HOSPADM

## 2022-10-05 RX ORDER — CEFAZOLIN SODIUM/WATER 2 G/20 ML
2 SYRINGE (ML) INTRAVENOUS SEE ADMIN INSTRUCTIONS
Status: DISCONTINUED | OUTPATIENT
Start: 2022-10-05 | End: 2022-10-05 | Stop reason: HOSPADM

## 2022-10-05 RX ORDER — LORAZEPAM 0.5 MG/1
0.5 TABLET ORAL EVERY 4 HOURS PRN
Status: DISCONTINUED | OUTPATIENT
Start: 2022-10-05 | End: 2022-10-06 | Stop reason: HOSPADM

## 2022-10-05 RX ORDER — ONDANSETRON 2 MG/ML
4 INJECTION INTRAMUSCULAR; INTRAVENOUS EVERY 6 HOURS PRN
Status: DISCONTINUED | OUTPATIENT
Start: 2022-10-05 | End: 2022-10-06 | Stop reason: HOSPADM

## 2022-10-05 RX ORDER — FENTANYL CITRATE 0.05 MG/ML
25 INJECTION, SOLUTION INTRAMUSCULAR; INTRAVENOUS
Status: DISCONTINUED | OUTPATIENT
Start: 2022-10-05 | End: 2022-10-05 | Stop reason: HOSPADM

## 2022-10-05 RX ORDER — HYDROXYZINE HYDROCHLORIDE 25 MG/1
25 TABLET, FILM COATED ORAL EVERY 6 HOURS PRN
Status: CANCELLED | OUTPATIENT
Start: 2022-10-05

## 2022-10-05 RX ORDER — LIDOCAINE HYDROCHLORIDE 20 MG/ML
INJECTION, SOLUTION INFILTRATION; PERINEURAL PRN
Status: DISCONTINUED | OUTPATIENT
Start: 2022-10-05 | End: 2022-10-05

## 2022-10-05 RX ORDER — ONDANSETRON 4 MG/1
4-8 TABLET, ORALLY DISINTEGRATING ORAL EVERY 8 HOURS PRN
Qty: 10 TABLET | Refills: 0 | Status: SHIPPED | OUTPATIENT
Start: 2022-10-05 | End: 2024-05-07

## 2022-10-05 RX ORDER — DEXAMETHASONE SODIUM PHOSPHATE 4 MG/ML
INJECTION, SOLUTION INTRA-ARTICULAR; INTRALESIONAL; INTRAMUSCULAR; INTRAVENOUS; SOFT TISSUE PRN
Status: DISCONTINUED | OUTPATIENT
Start: 2022-10-05 | End: 2022-10-05

## 2022-10-05 RX ORDER — KETOROLAC TROMETHAMINE 30 MG/ML
INJECTION, SOLUTION INTRAMUSCULAR; INTRAVENOUS PRN
Status: DISCONTINUED | OUTPATIENT
Start: 2022-10-05 | End: 2022-10-05

## 2022-10-05 RX ORDER — ONDANSETRON 4 MG/1
4 TABLET, ORALLY DISINTEGRATING ORAL EVERY 8 HOURS PRN
Qty: 4 TABLET | Refills: 0 | Status: SHIPPED | OUTPATIENT
Start: 2022-10-05 | End: 2024-05-07

## 2022-10-05 RX ORDER — FENTANYL CITRATE 50 UG/ML
INJECTION, SOLUTION INTRAMUSCULAR; INTRAVENOUS PRN
Status: DISCONTINUED | OUTPATIENT
Start: 2022-10-05 | End: 2022-10-05

## 2022-10-05 RX ORDER — KETOROLAC TROMETHAMINE 30 MG/ML
30 INJECTION, SOLUTION INTRAMUSCULAR; INTRAVENOUS EVERY 6 HOURS
Status: CANCELLED | OUTPATIENT
Start: 2022-10-05 | End: 2022-10-06

## 2022-10-05 RX ORDER — ONDANSETRON 4 MG/1
4 TABLET, ORALLY DISINTEGRATING ORAL EVERY 6 HOURS PRN
Status: CANCELLED | OUTPATIENT
Start: 2022-10-05

## 2022-10-05 RX ORDER — HYDROXYZINE HYDROCHLORIDE 25 MG/1
25 TABLET, FILM COATED ORAL EVERY 6 HOURS PRN
Qty: 30 TABLET | Refills: 0 | Status: CANCELLED | OUTPATIENT
Start: 2022-10-05

## 2022-10-05 RX ORDER — HYDROXYZINE HYDROCHLORIDE 25 MG/1
25 TABLET, FILM COATED ORAL EVERY 6 HOURS PRN
Status: DISCONTINUED | OUTPATIENT
Start: 2022-10-05 | End: 2022-10-06 | Stop reason: HOSPADM

## 2022-10-05 RX ORDER — HYDROMORPHONE HCL IN WATER/PF 6 MG/30 ML
0.4 PATIENT CONTROLLED ANALGESIA SYRINGE INTRAVENOUS
Status: CANCELLED | OUTPATIENT
Start: 2022-10-05

## 2022-10-05 RX ORDER — LIDOCAINE 40 MG/G
CREAM TOPICAL
Status: DISCONTINUED | OUTPATIENT
Start: 2022-10-05 | End: 2022-10-06 | Stop reason: HOSPADM

## 2022-10-05 RX ADMIN — KETOROLAC TROMETHAMINE 30 MG: 30 INJECTION, SOLUTION INTRAMUSCULAR at 08:54

## 2022-10-05 RX ADMIN — KETOROLAC TROMETHAMINE 30 MG: 15 INJECTION, SOLUTION INTRAMUSCULAR; INTRAVENOUS at 17:20

## 2022-10-05 RX ADMIN — POTASSIUM & SODIUM PHOSPHATES POWDER PACK 280-160-250 MG 1 PACKET: 280-160-250 PACK at 22:52

## 2022-10-05 RX ADMIN — HYDROMORPHONE HYDROCHLORIDE 0.2 MG: 0.2 INJECTION, SOLUTION INTRAMUSCULAR; INTRAVENOUS; SUBCUTANEOUS at 11:46

## 2022-10-05 RX ADMIN — PHENYLEPHRINE HYDROCHLORIDE 100 MCG: 10 INJECTION INTRAVENOUS at 07:55

## 2022-10-05 RX ADMIN — MIDAZOLAM HYDROCHLORIDE 1 MG: 1 INJECTION, SOLUTION INTRAMUSCULAR; INTRAVENOUS at 09:32

## 2022-10-05 RX ADMIN — SODIUM CHLORIDE, POTASSIUM CHLORIDE, SODIUM LACTATE AND CALCIUM CHLORIDE: 600; 310; 30; 20 INJECTION, SOLUTION INTRAVENOUS at 08:50

## 2022-10-05 RX ADMIN — PROPOFOL 150 MG: 10 INJECTION, EMULSION INTRAVENOUS at 07:48

## 2022-10-05 RX ADMIN — FENTANYL CITRATE 25 MCG: 50 INJECTION, SOLUTION INTRAMUSCULAR; INTRAVENOUS at 09:42

## 2022-10-05 RX ADMIN — FAMOTIDINE 20 MG: 20 TABLET ORAL at 20:51

## 2022-10-05 RX ADMIN — SUGAMMADEX 110 MG: 100 INJECTION, SOLUTION INTRAVENOUS at 08:50

## 2022-10-05 RX ADMIN — HYDROMORPHONE HYDROCHLORIDE 0.5 MG: 1 INJECTION, SOLUTION INTRAMUSCULAR; INTRAVENOUS; SUBCUTANEOUS at 09:05

## 2022-10-05 RX ADMIN — FENTANYL CITRATE 50 MCG: 50 INJECTION, SOLUTION INTRAMUSCULAR; INTRAVENOUS at 07:48

## 2022-10-05 RX ADMIN — SODIUM CHLORIDE, POTASSIUM CHLORIDE, SODIUM LACTATE AND CALCIUM CHLORIDE: 600; 310; 30; 20 INJECTION, SOLUTION INTRAVENOUS at 06:25

## 2022-10-05 RX ADMIN — KETOROLAC TROMETHAMINE 30 MG: 15 INJECTION, SOLUTION INTRAMUSCULAR; INTRAVENOUS at 22:53

## 2022-10-05 RX ADMIN — ACETAMINOPHEN 975 MG: 325 TABLET, FILM COATED ORAL at 06:20

## 2022-10-05 RX ADMIN — DEXAMETHASONE SODIUM PHOSPHATE 4 MG: 4 INJECTION, SOLUTION INTRA-ARTICULAR; INTRALESIONAL; INTRAMUSCULAR; INTRAVENOUS; SOFT TISSUE at 08:12

## 2022-10-05 RX ADMIN — HYDROMORPHONE HYDROCHLORIDE 2 MG: 2 TABLET ORAL at 16:36

## 2022-10-05 RX ADMIN — HYDROMORPHONE HYDROCHLORIDE 4 MG: 2 TABLET ORAL at 21:31

## 2022-10-05 RX ADMIN — MIDAZOLAM 2 MG: 1 INJECTION INTRAMUSCULAR; INTRAVENOUS at 07:42

## 2022-10-05 RX ADMIN — POTASSIUM & SODIUM PHOSPHATES POWDER PACK 280-160-250 MG 1 PACKET: 280-160-250 PACK at 20:49

## 2022-10-05 RX ADMIN — PROPOFOL 30 MCG/KG/MIN: 10 INJECTION, EMULSION INTRAVENOUS at 07:53

## 2022-10-05 RX ADMIN — Medication 2 G: at 07:42

## 2022-10-05 RX ADMIN — HYDROXYZINE HYDROCHLORIDE 25 MG: 50 INJECTION, SOLUTION INTRAMUSCULAR at 10:26

## 2022-10-05 RX ADMIN — ACETAMINOPHEN 975 MG: 325 TABLET, FILM COATED ORAL at 15:13

## 2022-10-05 RX ADMIN — HYDROMORPHONE HYDROCHLORIDE 0.2 MG: 0.2 INJECTION, SOLUTION INTRAMUSCULAR; INTRAVENOUS; SUBCUTANEOUS at 10:33

## 2022-10-05 RX ADMIN — ONDANSETRON 4 MG: 2 INJECTION INTRAMUSCULAR; INTRAVENOUS at 08:50

## 2022-10-05 RX ADMIN — MIDAZOLAM HYDROCHLORIDE 1 MG: 1 INJECTION, SOLUTION INTRAMUSCULAR; INTRAVENOUS at 09:25

## 2022-10-05 RX ADMIN — HYDROMORPHONE HYDROCHLORIDE 0.2 MG: 0.2 INJECTION, SOLUTION INTRAMUSCULAR; INTRAVENOUS; SUBCUTANEOUS at 19:57

## 2022-10-05 RX ADMIN — Medication 500 MG: at 20:50

## 2022-10-05 RX ADMIN — LORAZEPAM 0.5 MG: 0.5 TABLET ORAL at 14:20

## 2022-10-05 RX ADMIN — LIDOCAINE HYDROCHLORIDE 80 MG: 20 INJECTION, SOLUTION INFILTRATION; PERINEURAL at 07:48

## 2022-10-05 RX ADMIN — PHENYLEPHRINE HYDROCHLORIDE 150 MCG: 10 INJECTION INTRAVENOUS at 08:01

## 2022-10-05 RX ADMIN — LORAZEPAM 0.5 MG: 0.5 TABLET ORAL at 09:59

## 2022-10-05 RX ADMIN — FENTANYL CITRATE 25 MCG: 50 INJECTION, SOLUTION INTRAMUSCULAR; INTRAVENOUS at 09:29

## 2022-10-05 RX ADMIN — ROCURONIUM BROMIDE 50 MG: 50 INJECTION, SOLUTION INTRAVENOUS at 07:49

## 2022-10-05 RX ADMIN — PHENYLEPHRINE HYDROCHLORIDE 100 MCG: 10 INJECTION INTRAVENOUS at 07:58

## 2022-10-05 RX ADMIN — FAMOTIDINE 20 MG: 20 TABLET ORAL at 14:20

## 2022-10-05 RX ADMIN — DEXMEDETOMIDINE HYDROCHLORIDE 10 MCG: 100 INJECTION, SOLUTION INTRAVENOUS at 08:45

## 2022-10-05 RX ADMIN — HYDROMORPHONE HYDROCHLORIDE 0.2 MG: 0.2 INJECTION, SOLUTION INTRAMUSCULAR; INTRAVENOUS; SUBCUTANEOUS at 10:02

## 2022-10-05 ASSESSMENT — ACTIVITIES OF DAILY LIVING (ADL)
ADLS_ACUITY_SCORE: 35
ADLS_ACUITY_SCORE: 36
ADLS_ACUITY_SCORE: 35
ADLS_ACUITY_SCORE: 35
ADLS_ACUITY_SCORE: 36
ADLS_ACUITY_SCORE: 36
ADLS_ACUITY_SCORE: 35

## 2022-10-05 ASSESSMENT — ENCOUNTER SYMPTOMS: DYSRHYTHMIAS: 0

## 2022-10-05 NOTE — ANESTHESIA PREPROCEDURE EVALUATION
Anesthesia Pre-Procedure Evaluation    Patient: Dae Lamas   MRN: [id number] : 1975        Procedure : Procedure(s):  ROBOTIC ASSISTED TOTAL HYSTERECTOMY, LEFT SALPINGO-OOPHORECTOMY, RIGHT SALPINGECTOMY          Past Medical History:   Diagnosis Date     Abnormal involuntary movement 2017    Author: Shaheed Freeman MD Service: Hospitalist Author Type: Physician    Filed: 2015  8:10 PM Note Time: 2015 10:35 AM Status: Signed   : Shaheed Freeman MD (Physician)     Expand All Collapse All    DATE OF ADMISSION:  2015      DATE OF DISCHARGE:  2015      PRIMARY CARE PHYSICIAN:  Nancy Brady MD      CHIEF COMPLAINT:  Involuntary jerky movements.         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 Orders    EEG        Expand All Collapse All    ONE-HOUR VIDEO ELECTROENCEPHALOGRAM       ORDERING PHYSICIAN:  Lito Jasmine MD       EEG #:        This EEG was done on Dae Lamas with video monitoring recording of the spell.  EEG demonstrated some sharp wave activity bilaterally that is not rhythmic.  The patient otherwise had a spell that started with tingling and d     H/O magnetic resonance imaging of brain and brain stem 3/6/2017    2017 normal brain mri     Major depressive disorder, single episode, mild (H)     chronic     Myalgia and myositis, unspecified     episodic low back and hip pain     Ruptured ovarian cyst 2011    right, requiring laparoscopy for drainage intra-abdominal hematoma     Serotonin syndrome     SSRI related (paroxetine and venlaxine), also triggered by anesthetic agents in      Supervision of other normal pregnancy      - vaginal, pergonal for infertility      Past Surgical History:    Procedure Laterality Date     ASPIRATION AND CURETTAGE  3/2008    Missed AB following in vitro fertilzation/implantation     GYN SURGERY      ruptured ovarian cyst     HC REPAIR OF NASAL SEPTUM  4/2000     LAPAROSCOPIC APPENDECTOMY N/A 8/23/2021    Procedure: LAPAROSCOPIC APPENDECTOMY;  Surgeon: Nazario Contreras MD;  Location: [facility] OR     LAPAROSCOPY  1/2011    for drainage intra-abdominal hematoma s/p ruptured right ovarian cyst      Allergies   Allergen Reactions     No Clinical Screening - See Comments      Other reaction(s): Other, see comments  PN: Don't give medications that increase serotonin level, will end up in ICU.      Codeine GI Disturbance     Codeine      Other reaction(s): Other, see comments  PN:  Nausea     Gabapentin      Latex      Added based on information entered during case entry, please review and add reactions, type, and severity as needed     Paroxetine      serotonin syndrome, all seratonin drugs     Paroxetine      PN: ho serotonin syndrome per pet     Venlafaxine      serotonin syndrome      Social History     Tobacco Use     Smoking status: Never Smoker     Smokeless tobacco: Never Used   Substance Use Topics     Alcohol use: Yes     Comment: 1 per month      Wt Readings from Last 1 Encounters:   02/16/22 50.8 kg (112 lb)        Anesthesia Evaluation   Pt has had prior anesthetic. Type: General.    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  abnormality     (+) , H/O Chronic Pain (Fibromyalgia),        Physical Exam    Airway        Mallampati: I   TM distance: > 3 FB   Neck ROM: full   Mouth opening: > 3 cm    Respiratory Devices and Support         Dental  no notable dental history         Cardiovascular   cardiovascular exam normal          Pulmonary   pulmonary exam normal                OUTSIDE LABS:  CBC:   Lab Results   Component Value Date    WBC 5.1 02/16/2022    WBC 9.8 08/24/2021    HGB 13.2 02/16/2022    HGB 13.1 08/24/2021    HCT 40.2 02/16/2022    HCT 37.9 08/24/2021     02/16/2022     08/24/2021     BMP:   Lab Results   Component Value Date     02/16/2022     08/25/2021    POTASSIUM 3.9 02/16/2022    POTASSIUM 3.8 08/25/2021    CHLORIDE 112 (H) 02/16/2022    CHLORIDE 115 (H) 08/25/2021    CO2 22 02/16/2022    CO2 24 08/25/2021    BUN 16 02/16/2022    BUN 4 (L) 08/25/2021    CR 0.80 02/16/2022    CR 0.92 08/25/2021    GLC 95 02/16/2022     (H) 08/25/2021     COAGS:   Lab Results   Component Value Date    INR 1.04 08/22/2008     POC:   Lab Results   Component Value Date     (H) 01/10/2011    HCGS Negative 02/16/2022     HEPATIC:   Lab Results   Component Value Date    ALBUMIN 3.4 02/16/2022    PROTTOTAL 7.1 02/16/2022    ALT 49 02/16/2022    AST 38 02/16/2022    ALKPHOS 54 02/16/2022    BILITOTAL 0.3 02/16/2022    KRISTI 27 08/23/2016     OTHER:   Lab Results   Component Value Date    LACT 2.0 02/16/2022    CHIDI 8.7 02/16/2022    PHOS 4.2 08/21/2008    MAG 2.3 08/25/2021    LIPASE 449 (H) 08/24/2021    TSH 0.75 03/13/2017    T4 0.82 07/02/2015    CRP <2.9 03/13/2017    SED 7 03/13/2017       Anesthesia Plan    ASA Status:  3   NPO Status:  NPO Appropriate    Anesthesia Type: General.     - Airway: ETT   Induction: Intravenous, Propofol.   Maintenance: Balanced.        Consents    Anesthesia Plan(s) and associated risks, benefits, and realistic alternatives discussed. Questions answered and  patient/representative(s) expressed understanding.     - Discussed: Risks, Benefits and Alternatives for the PROCEDURE were discussed     - Discussed with:  Patient         Postoperative Care    Pain management: IV analgesics, Multi-modal analgesia.   PONV prophylaxis: Ondansetron (or other 5HT-3), Dexamethasone or Solumedrol, Background Propofol Infusion     Comments:    Other Comments: Hydromorphone  Ketamine            Aureliano Saleem MD

## 2022-10-05 NOTE — OR NURSING
Patient brought a picture of home covid antigen test on phone as directed.  I personally saw this result and it was time stamped 10/4/2022.  Result was negative.

## 2022-10-05 NOTE — ANESTHESIA POSTPROCEDURE EVALUATION
Patient: Dae Castañeda    Procedure: Procedure(s):  ROBOTIC ASSISTED TOTAL HYSTERECTOMY, LEFT SALPINGO-OOPHORECTOMY, RIGHT SALPINGECTOMY,LYSIS OF ADHESIONS       Anesthesia Type:  General    Note:  Disposition: Outpatient   Postop Pain Control: Uneventful            Sign Out: Well controlled pain   PONV: No   Neuro/Psych: Uneventful            Sign Out: Acceptable/Baseline neuro status   Airway/Respiratory: Uneventful            Sign Out: Acceptable/Baseline resp. status   CV/Hemodynamics: Uneventful            Sign Out: Acceptable CV status   Other NRE: NONE   DID A NON-ROUTINE EVENT OCCUR? No           Last vitals:  Vitals Value Taken Time   /63 10/05/22 1115   Temp 36.1  C (97  F) 10/05/22 0908   Pulse 78 10/05/22 1126   Resp 11 10/05/22 1126   SpO2 98 % 10/05/22 1126   Vitals shown include unvalidated device data.    Electronically Signed By: Aureliano Saleem MD  October 5, 2022  11:27 AM

## 2022-10-05 NOTE — CONSULTS
"[facility]  Consult Note - Hospitalist Service  Date of Admission:  10/5/2022  Consult Requested by: Dr. Collazo  Reason for Consult: Post-op tremors     Assessment & Plan   Dae HAKAN Castañeda is a 47 year old female with ongoing hx of described internal tremulous episodes dating back to 1995 with prior, extensive neurologic work-up admitted on 10/5/2022 subsequent to total lap hysterectomy with LSO and right salpingectomy. She experienced an acute bout of her tremulous episodes in the post-op period. Hospitalist service was consulted for further evaluation and treatment.     Acute on chronic tremulous episodes: Currently follows with [facility]. Prior to that, was seen by [facility] and currently working on getting referral to [facility]. Describes episodes dating back to 1995. Describes internal vibration tremors in her extremities that sometimes radiate into her core and can become violent movements. She never loses consciousness. Prior triggers include specific medications such as Wellbutrin and Benadryl, working out, stressful situations, sitting in a bouncing vehicle or boat. Denies hx of seizure. No family hx of neurologic disease. No loss of bladder or bowel function. Reports a constant \"internal vibration.\" Prescribed Ativan which makes her drowsy/fall asleep, but doesn't stop her symptoms.   * Reviewed last note from Neurology from 7/14/22; please refer to this note in CareEverywhere for extensive work-up to date.   - Neurology consultation, appreciate input. ? Nonepileptic seizures?   - Seizure precautions   - Continue PTA Ativan for now   - BMP, CBC, TSH ordered     ADDENDUM 1654: Phos 2.2. Electrolyte replacement protocol placed.     S/P total lap hysterectomy with LSO and right salpingectomy: Surgery performed for pelvic congestion, chronic pelvic pain, dyspareunia.   -- Defer routine post-operative cares, IVF, DVT prophylaxis and pain " "control to primary service   -- Encourage pulmonary toilet; incentive spirometer at bedside   -- Bowel regimen in place while on narcotics   -- CBC and BMP in AM     Insomnia: Reports overall very poor sleep due to her internal tremors. Previously benadryl helped, but her Neurologist recommended discontinuing. Has not tolerated Lunesta or Ambien. Melatonin does not help her. Currently using Atarax and will have one alcoholic beverage. There will be times where she only sleeps 2 hours at night.   - Continue with PTA Atarax   - Consider Sleep Medicine referral      The patient's care was discussed with the Attending Physician, Dr. Elliott, Bedside Nurse and Patient.    Brigitte Montero PA-C  [facility]  Securely message with the Vocera Web Console (learn more here)  Text page via [facility] Paging/Directory       Hospitalist Service    Clinically Significant Risk Factors Present on Admission                        ______________________________________________________________________    Chief Complaint   Tremulous episodes     History is obtained from the patient and extensive chart review.     History of Present Illness   Currently follows with [facility]. Prior to that, was seen by [facility] and currently working on getting referral to [facility]. Describes episodes dating back to 1995. Describes internal vibration tremors in her extremities that sometimes radiate into her core. She never loses consciousness. Prior triggers include specific medications such as Wellbutrin and Benadryl, working out, stressful situations, sitting in a bouncy vehicle or boat. Denies hx of seizure. No family hx of neurologic disease. No loss of bladder or bowel function. Reports a constant \"internal vibration.\" Prescribed Ativan which makes her drowsy/fall asleep, but doesn't stop her symptoms.     Reports overall very poor sleep due to her internal tremors. " Previously benadryl helped, but her Neurologist recommended discontinuing. Has not tolerated Lunesta or Ambien. Melatonin does not help her. Currently using Atarax and will have one alcoholic beverage. There will be times where she only sleeps 2 hours at night. Has cats at home. No recent travel.     Review of Systems   The 10 point Review of Systems is negative other than noted in the HPI.    Past Medical History    I have reviewed this patient's medical history and updated it with pertinent information if needed.   Past Medical History:   Diagnosis Date     Abnormal involuntary movement 1/31/2017    Author: Shaheed Freeman MD Service: Hospitalist Author Type: Physician    Filed: 5/14/2015  8:10 PM Note Time: 5/14/2015 10:35 AM Status: Signed   : Shaheed Freeman MD (Physician)     Expand All Collapse All    DATE OF ADMISSION:  05/12/2015      DATE OF DISCHARGE:  05/14/2015      PRIMARY CARE PHYSICIAN:  Nancy Brady MD      CHIEF COMPLAINT:  Involuntary jerky movements.         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 Orders    EEG        Expand All Collapse All    ONE-HOUR VIDEO ELECTROENCEPHALOGRAM       ORDERING PHYSICIAN:  Lito Jasmine MD       EEG #:        This EEG was done on [facility] with video monitoring recording of the spell.  EEG demonstrated some sharp wave activity bilaterally that is not rhythmic.  The patient otherwise had a spell that started with tingling and d     H/O magnetic resonance imaging of brain and brain stem 3/6/2017    2/13/2017 normal brain mri     Major depressive disorder, single episode, mild (H) 1995    chronic     Myalgia and myositis, unspecified 1995    episodic low back and hip pain     Ruptured ovarian cyst 1/2011    right, requiring  laparoscopy for drainage intra-abdominal hematoma     Serotonin syndrome     SSRI related (paroxetine and venlaxine), also triggered by anesthetic agents in      Supervision of other normal pregnancy      - vaginal, pergonal for infertility       Past Surgical History   I have reviewed this patient's surgical history and updated it with pertinent information if needed.  Past Surgical History:   Procedure Laterality Date     ASPIRATION AND CURETTAGE  3/2008    Missed AB following in vitro fertilzation/implantation     DAVINCI HYSTERECTOMY TOTAL, BILATERAL SALPINGO-OOPHORECTOMY, COMBINED N/A 10/5/2022    Procedure: ROBOTIC ASSISTED TOTAL HYSTERECTOMY, LEFT SALPINGO-OOPHORECTOMY, RIGHT SALPINGECTOMY,LYSIS OF ADHESIONS;  Surgeon: Shine Collazo MD;  Location:  OR     GYN SURGERY      ruptured ovarian cyst     HC REPAIR OF NASAL SEPTUM  2000     LAPAROSCOPIC APPENDECTOMY N/A 2021    Procedure: LAPAROSCOPIC APPENDECTOMY;  Surgeon: Nazario Contreras MD;  Location:  OR     LAPAROSCOPY  2011    for drainage intra-abdominal hematoma s/p ruptured right ovarian cyst       Social History   I have reviewed this patient's social history and updated it with pertinent information if needed.  Social History     Tobacco Use     Smoking status: Never Smoker     Smokeless tobacco: Never Used   Substance Use Topics     Alcohol use: Yes     Drug use: No       Family History   I have reviewed this patient's family history and updated it with pertinent information if needed.  Family History   Problem Relation Age of Onset     Lipids Mother      Thyroid Disease Mother         hypothyroidism     Breast Cancer Mother 61     Depression Father      Hypertension Father      C.A.D. Father          MI age 64     Diabetes Maternal Grandmother         type 2     Diabetes Maternal Grandfather         type 2     C.A.D. Maternal Grandfather         multiple MIs, onset age 50s     Tremor Maternal Grandfather       Prostate Cancer Paternal Grandfather          age 70s     Medical History Unknown Daughter      Glaucoma No family hx of      Macular Degeneration No family hx of        Medications   Medications Prior to Admission   Medication Sig Dispense Refill Last Dose     drospirenone-ethinyl estradiol (ELIS) 3-0.02 MG tablet Take 1 tablet by mouth daily   10/4/2022 at Unknown time     hydrOXYzine (ATARAX) 25 MG tablet Take 1 tablet (25 mg) by mouth 3 times daily as needed for other (pain or muscle twitching) 20 tablet 1 Past Week at Unknown time     LORazepam (ATIVAN) 0.5 MG tablet Take 0.5 mg by mouth 2 times daily as needed for anxiety   Unknown at Unknown time     ondansetron (ZOFRAN-ODT) 4 MG ODT tab Take 1 tablet (4 mg) by mouth every 6 hours as needed for nausea or vomiting 10 tablet 0 Unknown at Unknown time     polyethylene glycol (MIRALAX) 17 GM/Dose powder Take 17 g by mouth daily as needed for constipation 510 g 0 Unknown at Unknown time     senna-docusate (SENOKOT-S/PERICOLACE) 8.6-50 MG tablet Take 1 tablet by mouth 2 times daily as needed for constipation (while on pain medications) 10 tablet 0 Unknown at Unknown time       Allergies   Allergies   Allergen Reactions     No Clinical Screening - See Comments      Other reaction(s): Other, see comments  PN: Don't give medications that increase serotonin level, will end up in ICU.      Codeine GI Disturbance     Codeine      Other reaction(s): Other, see comments  PN:  Nausea     Gabapentin      Serotonin syndrome     Latex      Added based on information entered during case entry, please review and add reactions, type, and severity as needed     Paroxetine      serotonin syndrome, all seratonin drugs     Paroxetine      PN: ho serotonin syndrome per pet     Venlafaxine      serotonin syndrome       Physical Exam   Vital Signs: Temp: 98.1  F (36.7  C) Temp src: Oral BP: 105/65 Pulse: 92   Resp: 14 SpO2: 99 % O2 Device: None (Room air) Oxygen Delivery: 9  LPM  Weight: 120 lbs 9.6 oz    CONSTITUTIONAL: Pt laying in bed, dressed in hospital garb. Appears comfortable. Cooperative with interview.   HEENT: Normocephalic, atraumatic.   CARDIOVASCULAR: RRR, no murmurs, rubs, or extra heart sounds appreciated. Pulses +2/4 and regular in upper and lower extremities, bilaterally.   RESPIRATORY: No increased work of breathing.  CTA, bilat; no wheezes, rales, or rhonchi appreciated.  GASTROINTESTINAL:  Abdomen soft, non-distended. BS auscultated in all four quadrants. Negative for tenderness to palpation.  No masses or organomegaly noted.  MUSCULOSKELETAL: Strength +5/5 in upper and lower extremities, bilaterally. No gross deformities noted. Normal muscle tone.   HEMATOLOGIC/LYMPHATIC/IMMUNOLOGIC: Negative for lower extremity edema, bilaterally.  NEUROLOGIC: Alert and oriented to person, place, and time.  strength intact. CN's II-XII grossly intact. Sensation equal and intact in upper and lower extremities bilat  SKIN: Warm, dry, intact.   PSYCH: Tearful during conversation, reports poor sleep the night prior to surgery     Data   Results for orders placed or performed during the hospital encounter of 10/05/22 (from the past 24 hour(s))   Hemoglobin   Result Value Ref Range    Hemoglobin 13.0 11.7 - 15.7 g/dL   HCG quantitative pregnancy   Result Value Ref Range    hCG Quantitative <1 0 - 5 IU/L   Basic metabolic panel   Result Value Ref Range    Sodium 135 133 - 144 mmol/L    Potassium 3.7 3.4 - 5.3 mmol/L    Chloride 104 94 - 109 mmol/L    Carbon Dioxide (CO2) 26 20 - 32 mmol/L    Anion Gap 5 3 - 14 mmol/L    Urea Nitrogen 10 7 - 30 mg/dL    Creatinine 0.67 0.52 - 1.04 mg/dL    Calcium 8.7 8.5 - 10.1 mg/dL    Glucose 163 (H) 70 - 99 mg/dL    GFR Estimate >90 >60 mL/min/1.73m2   Magnesium   Result Value Ref Range    Magnesium 2.0 1.6 - 2.3 mg/dL   Phosphorus   Result Value Ref Range    Phosphorus 2.2 (L) 2.5 - 4.5 mg/dL   CBC with Platelets & Differential    Narrative     The following orders were created for panel order CBC with Platelets & Differential.  Procedure                               Abnormality         Status                     ---------                               -----------         ------                     CBC with platelets and d...[[id number]]  Abnormal            Final result                 Please view results for these tests on the individual orders.   CBC with platelets and differential   Result Value Ref Range    WBC Count 12.3 (H) 4.0 - 11.0 10e3/uL    RBC Count 4.36 3.80 - 5.20 10e6/uL    Hemoglobin 13.2 11.7 - 15.7 g/dL    Hematocrit 38.3 35.0 - 47.0 %    MCV 88 78 - 100 fL    MCH 30.3 26.5 - 33.0 pg    MCHC 34.5 31.5 - 36.5 g/dL    RDW 11.4 10.0 - 15.0 %    Platelet Count 243 150 - 450 10e3/uL    % Neutrophils 93 %    % Lymphocytes 4 %    % Monocytes 2 %    % Eosinophils 0 %    % Basophils 0 %    % Immature Granulocytes 1 %    NRBCs per 100 WBC 0 <1 /100    Absolute Neutrophils 11.5 (H) 1.6 - 8.3 10e3/uL    Absolute Lymphocytes 0.5 (L) 0.8 - 5.3 10e3/uL    Absolute Monocytes 0.3 0.0 - 1.3 10e3/uL    Absolute Eosinophils 0.0 0.0 - 0.7 10e3/uL    Absolute Basophils 0.0 0.0 - 0.2 10e3/uL    Absolute Immature Granulocytes 0.1 <=0.4 10e3/uL    Absolute NRBCs 0.0 10e3/uL

## 2022-10-05 NOTE — OP NOTE
[facility] Gynecology Brief Operative Note    Pre-operative diagnosis: Pelvic congestion syndrome  Chronic pelvic pain  dyspareunia   Post-operative diagnosis: Same   Procedure: davinci total laparoscopic hystectomy with LSO and right salpingectomy.   Lysis of adhesions   Surgeon: Shine Collazo MD   Assistant(s): danielle Chavez   Anesthesia: General Endotracheal Anesthesia   Estimated blood loss: less than 50ml   Total IV fluids: (See anesthesia record)   Blood transfusion: No transfusion was given during surgery   Total urine output: (See anesthesia record)   Drains: None   Specimens: Uterus left tube and ovary right fallopian tube   Findings: Streak left ovary. Fibroid uterus. Normal right tube and ovary. Adhesions of colon to left adnexa   Complications: None   Condition: Stable   Comments: See dictated operative report for full details

## 2022-10-05 NOTE — ANESTHESIA PROCEDURE NOTES
Airway       Patient location during procedure: OR       Procedure Start/Stop Times: 10/5/2022 7:51 AM  Staff -        Anesthesiologist:  Aureliano Saleem MD       CRNA: Anali Beth APRN CRNA       Performed By: CRNA  Consent for Airway        Urgency: elective  Indications and Patient Condition       Indications for airway management: tigre-procedural       Induction type:intravenous       Mask difficulty assessment: 1 - vent by mask    Final Airway Details       Final airway type: endotracheal airway       Successful airway: ETT - single  Endotracheal Airway Details        ETT size (mm): 7.0       Cuffed: yes       Successful intubation technique: direct laryngoscopy       DL Blade Type: Beth 2       Grade View of Cords: 1       Adjucts: stylet       Position: Right       Measured from: lips       Secured at (cm): 20       Bite block used: None    Post intubation assessment        Placement verified by: capnometry, equal breath sounds and chest rise        Number of attempts at approach: 1       Secured with: pink tape       Ease of procedure: easy       Dentition: Intact and Unchanged    Medication(s) Administered   Medication Administration Time: 10/5/2022 7:51 AM

## 2022-10-05 NOTE — DISCHARGE INSTRUCTIONS
Same Day Surgery Discharge Instructions for  Sedation and General Anesthesia     It's not unusual to feel dizzy, light-headed or faint for up to 24 hours after surgery or while taking pain medication.  If you have these symptoms: sit for a few minutes before standing and have someone assist you when you get up to walk or use the bathroom.    You should rest and relax for the next 24 hours. We recommend you make arrangements to have an adult stay with you for at least 24 hours after your discharge.  Avoid hazardous and strenuous activity.    DO NOT DRIVE any vehicle or operate mechanical equipment for 24 hours following the end of your surgery.  Even though you may feel normal, your reactions may be affected by the medication you have received.    Do not drink alcoholic beverages for 24 hours following surgery.     Slowly progress to your regular diet as you feel able. It's not unusual to feel nauseated and/or vomit after receiving anesthesia.  If you develop these symptoms, drink clear liquids (apple juice, ginger ale, broth, 7-up, etc. ) until you feel better.  If your nausea and vomiting persists for 24 hours, please notify your surgeon.      All narcotic pain medications, along with inactivity and anesthesia, can cause constipation. Drinking plenty of liquids and increasing fiber intake will help.    For any questions of a medical nature, call your surgeon.    Do not make important decisions for 24 hours.    If you had general anesthesia, you may have a sore throat for a couple of days related to the breathing tube used during surgery.  You may use Cepacol lozenges to help with this discomfort.  If it worsens or if you develop a fever, contact your surgeon.     If you feel your pain is not well managed with the pain medications prescribed by your surgeon, please contact your surgeon's office to let them know so they can address your concerns.      Today you received Toradol, an antiinflammatory medication similar  to Ibuprofen.  You should not take other antiinflammatory medication, such as Ibuprofen, Motrin, Advil, Aleve, Naprosyn, etc until 3 pm.      **If you have questions or concerns about your procedure,  call Dr. Collazo at [phone]**         Today you were given 975 mg of Tylenol at 630 am. The recommended daily maximum dose is 4000 mg.

## 2022-10-05 NOTE — CARE PLAN
Upon arrival to PACU, patient having violent tremors of entire body in which patient stays conscious during. Per patient, this is a known issue.     Per [facility] orders, 2 mg versed, 0.5 mg ativan and 25 IM vistaril were given.    Tremor activity lasts about 1 min, occurred every 10 min until 10 am. Last tremor activity occurred at 10:20 am. Patients head stabilized during activity and seizure pads are on cart.

## 2022-10-05 NOTE — PROVIDER NOTIFICATION
MD Notification    Notified Person: MD    Notified Person Name: Brigitte Montero    Notification Date/Time: 10/5/22 3548    Notification Interaction: Bre    Purpose of Notification: lab: P is 2.2    Orders Received:    Comments:

## 2022-10-05 NOTE — ANESTHESIA CARE TRANSFER NOTE
Patient: Dae Castañeda    Procedure: Procedure(s):  ROBOTIC ASSISTED TOTAL HYSTERECTOMY, LEFT SALPINGO-OOPHORECTOMY, RIGHT SALPINGECTOMY,LYSIS OF ADHESIONS       Diagnosis: Deep dyspareunia [N94.12]  Diagnosis Additional Information: No value filed.    Anesthesia Type:   General     Note:    Oropharynx: oropharynx clear of all foreign objects and spontaneously breathing  Level of Consciousness: drowsy  Oxygen Supplementation: face mask  Level of Supplemental Oxygen (L/min / FiO2): 8  Independent Airway: airway patency satisfactory and stable  Dentition: dentition unchanged  Vital Signs Stable: post-procedure vital signs reviewed and stable  Report to RN Given: handoff report given  Patient transferred to: PACU    Handoff Report: Identifed the Patient, Identified the Reponsible Provider, Reviewed the pertinent medical history, Discussed the surgical course, Reviewed Intra-OP anesthesia mangement and issues during anesthesia, Set expectations for post-procedure period and Allowed opportunity for questions and acknowledgement of understanding      Vitals:  Vitals Value Taken Time   /57 10/05/22 0908   Temp 36.1  C (97  F) 10/05/22 0908   Pulse 76 10/05/22 0915   Resp 16 10/05/22 0915   SpO2 100 % 10/05/22 0915   Vitals shown include unvalidated device data.    Electronically Signed By: YOLANDA Lujan CRNA  October 5, 2022  9:16 AM

## 2022-10-06 VITALS
DIASTOLIC BLOOD PRESSURE: 57 MMHG | OXYGEN SATURATION: 97 % | TEMPERATURE: 98.2 F | HEART RATE: 73 BPM | BODY MASS INDEX: 19.38 KG/M2 | WEIGHT: 120.6 LBS | SYSTOLIC BLOOD PRESSURE: 94 MMHG | HEIGHT: 66 IN | RESPIRATION RATE: 12 BRPM

## 2022-10-06 LAB
ANION GAP SERPL CALCULATED.3IONS-SCNC: 7 MMOL/L (ref 3–14)
BASOPHILS # BLD AUTO: 0 10E3/UL (ref 0–0.2)
BASOPHILS NFR BLD AUTO: 0 %
BUN SERPL-MCNC: 13 MG/DL (ref 7–30)
CALCIUM SERPL-MCNC: 8.2 MG/DL (ref 8.5–10.1)
CHLORIDE BLD-SCNC: 105 MMOL/L (ref 94–109)
CO2 SERPL-SCNC: 24 MMOL/L (ref 20–32)
CREAT SERPL-MCNC: 0.75 MG/DL (ref 0.52–1.04)
EOSINOPHIL # BLD AUTO: 0.1 10E3/UL (ref 0–0.7)
EOSINOPHIL NFR BLD AUTO: 1 %
ERYTHROCYTE [DISTWIDTH] IN BLOOD BY AUTOMATED COUNT: 11.5 % (ref 10–15)
GFR SERPL CREATININE-BSD FRML MDRD: >90 ML/MIN/1.73M2
GLUCOSE BLD-MCNC: 104 MG/DL (ref 70–99)
HCT VFR BLD AUTO: 36.1 % (ref 35–47)
HGB BLD-MCNC: 11.8 G/DL (ref 11.7–15.7)
IMM GRANULOCYTES # BLD: 0 10E3/UL
IMM GRANULOCYTES NFR BLD: 0 %
LYMPHOCYTES # BLD AUTO: 1.7 10E3/UL (ref 0.8–5.3)
LYMPHOCYTES NFR BLD AUTO: 16 %
MCH RBC QN AUTO: 29.9 PG (ref 26.5–33)
MCHC RBC AUTO-ENTMCNC: 32.7 G/DL (ref 31.5–36.5)
MCV RBC AUTO: 91 FL (ref 78–100)
MONOCYTES # BLD AUTO: 0.8 10E3/UL (ref 0–1.3)
MONOCYTES NFR BLD AUTO: 8 %
NEUTROPHILS # BLD AUTO: 8.2 10E3/UL (ref 1.6–8.3)
NEUTROPHILS NFR BLD AUTO: 75 %
NRBC # BLD AUTO: 0 10E3/UL
NRBC BLD AUTO-RTO: 0 /100
PHOSPHATE SERPL-MCNC: 3.8 MG/DL (ref 2.5–4.5)
PLATELET # BLD AUTO: 210 10E3/UL (ref 150–450)
POTASSIUM BLD-SCNC: 3.8 MMOL/L (ref 3.4–5.3)
RBC # BLD AUTO: 3.95 10E6/UL (ref 3.8–5.2)
SODIUM SERPL-SCNC: 136 MMOL/L (ref 133–144)
WBC # BLD AUTO: 10.8 10E3/UL (ref 4–11)

## 2022-10-06 PROCEDURE — 82310 ASSAY OF CALCIUM: CPT | Performed by: PHYSICIAN ASSISTANT

## 2022-10-06 PROCEDURE — 250N000013 HC RX MED GY IP 250 OP 250 PS 637: Performed by: PHYSICIAN ASSISTANT

## 2022-10-06 PROCEDURE — 250N000011 HC RX IP 250 OP 636: Performed by: OBSTETRICS & GYNECOLOGY

## 2022-10-06 PROCEDURE — 250N000013 HC RX MED GY IP 250 OP 250 PS 637: Performed by: OBSTETRICS & GYNECOLOGY

## 2022-10-06 PROCEDURE — 85025 COMPLETE CBC W/AUTO DIFF WBC: CPT | Performed by: PHYSICIAN ASSISTANT

## 2022-10-06 PROCEDURE — 84100 ASSAY OF PHOSPHORUS: CPT | Performed by: PHYSICIAN ASSISTANT

## 2022-10-06 PROCEDURE — 99207 PR NO CHARGE LOS: CPT | Performed by: INTERNAL MEDICINE

## 2022-10-06 PROCEDURE — 36415 COLL VENOUS BLD VENIPUNCTURE: CPT | Performed by: PHYSICIAN ASSISTANT

## 2022-10-06 PROCEDURE — 250N000013 HC RX MED GY IP 250 OP 250 PS 637: Performed by: PSYCHIATRY & NEUROLOGY

## 2022-10-06 RX ADMIN — HYDROMORPHONE HYDROCHLORIDE 2 MG: 2 TABLET ORAL at 09:02

## 2022-10-06 RX ADMIN — Medication 500 MG: at 09:02

## 2022-10-06 RX ADMIN — HYDROMORPHONE HYDROCHLORIDE 2 MG: 2 TABLET ORAL at 03:13

## 2022-10-06 RX ADMIN — KETOROLAC TROMETHAMINE 30 MG: 15 INJECTION, SOLUTION INTRAMUSCULAR; INTRAVENOUS at 05:12

## 2022-10-06 RX ADMIN — POTASSIUM & SODIUM PHOSPHATES POWDER PACK 280-160-250 MG 1 PACKET: 280-160-250 PACK at 03:00

## 2022-10-06 RX ADMIN — FAMOTIDINE 20 MG: 20 TABLET ORAL at 09:02

## 2022-10-06 RX ADMIN — HYDROMORPHONE HYDROCHLORIDE 2 MG: 2 TABLET ORAL at 09:54

## 2022-10-06 ASSESSMENT — ACTIVITIES OF DAILY LIVING (ADL)
ADLS_ACUITY_SCORE: 36

## 2022-10-06 NOTE — PROGRESS NOTES
Pt arrived to this unit at ~1250. A&O x4, up with SBA. Ambulated to . [facility].RA. Advanced diet from full liquid to regular, pt tolerated it well, denied n/v. Pain managed with Oral Dilaudid, Toradol, and Tyl. Ativan given for anxiety. Abd incision site x4, dry and intact. On seizure precaution.

## 2022-10-06 NOTE — PROGRESS NOTES
Pt A&O x4, up with SBA. VSS,RA. Ambulated to BR. Pain managed with oral Dilaudid. Abd incision site dry and intact. No seizure episode. AVS and med reviewed with pt. All question answered. Pt refused the discharge med. Med returned to pharmacy. Pt discharged home with spouse transport at 1030.

## 2022-10-06 NOTE — PROGRESS NOTES
Chart check    Chart reviewed.  Labs are within normal limits.  Patient has been discharged by OB/GYN.  No specific medical needs identified.    Dee Dee Sunshine M.D.  Hospitalist  [facility]  Securely message with the [employer] Web Console (learn more here)  Text page via [facility] Paging/Directory

## 2022-10-06 NOTE — PROGRESS NOTES
Pt A/O x4. VSS on  RA. Up SBA. Continent B/B, ambulated to BR this shift. Reg diet. Pt denies SOB, N/V, chest pain. Abdominal pain treated with PO dilaudid, IV dilaudid  x1, scheduled toradol and tylenol as well as ice. Incision sites CDI. On seizure precautions, no seizure activity this shift. Phosphorus replacement protocol, given neutra-phos x3 lab draw rescheduled for AM . IV SL.

## 2022-10-06 NOTE — OP NOTE
Procedure Date: 10/05/2022    PREOPERATIVE DIAGNOSES:  1.  Chronic pelvic pain.  2.  Pelvic congestion syndrome.  3.  Fibroid uterus.  4.  Dyspareunia.    POSTOPERATIVE DIAGNOSES:  1.  Chronic pelvic pain.  2.  Pelvic congestion syndrome.  3.  Fibroid uterus.  4.  Dyspareunia.  5.  Abdominal adhesions.    PROCEDURES PERFORMED:    1.  Da Maria Fernanda total laparoscopic hysterectomy with left salpingo-oophorectomy and right salpingectomy.  2.  Lysis of adhesions.    SURGEON:  Shine Collazo MD    ASSISTANT:  Yolanda Chavez PA-C.    ANESTHESIA:  General.    ESTIMATED BLOOD LOSS:  5 mL    DRAINS:  None.    SPECIMENS:  Uterus, cervix, bilateral fallopian tubes and left ovary.    COMPLICATIONS:  None.    FINDINGS:  She had only a streak, small, almost absent left ovary, normal left fallopian tube, normal appearing right tube and ovary.  She had adhesions of her colon to her left adnexa.    DESCRIPTION OF TECHNIQUE:  The patient was taken to the operating room.  She was placed on the operating table in the dorsal supine position.  She was placed under general anesthesia without difficulty.  First, a timeout was performed.  Sterile Sal catheter was inserted into the bladder.  The bladder was drained for clear fluid.  Speculum was inserted.  Cervix was grasped at the anterior lip with a single tooth tenaculum.  A medium VCare intrauterine manipulator was then placed.  We then proceeded with the laparoscopic portion of the procedure.    The abdomen was palpated to be without masses.  An 8 mm umbilical incision was made with a scalpel blade.  Veress needle was inserted and insufflation was performed without difficulty.  The 8 mm trocar and sleeve were inserted.  Trocar was removed.  Camera was inserted.  This confirmed intraperitoneal placement.  The patient was placed in 29-degree Trendelenburg.  We measured about 10 cm right lateral, placed the 8 mm port here and then also placed an 8 mm port in the mid to right upper  quadrant.  We also measured 10 cm left lateral, placed at da Maria Fernanda accessory port here.  We docked the da Maria Fernanda robot.  I worked on the console.  Yolanda Chavez suction irrigated and retracted.  The first thing that we could see was her colon had adhesions to the left adnexa, encompassing primarily the left fallopian tube, so this had to be taken down with sharp dissection.  We used a monopolar scissors on the right and a vessel sealer on the left and Yolanda Chavez retracted the bowel medially so I could then lysis the adhesions to separate the bowel then, identifying the left adnexa better.  Yolanda then retracted the uterus to the right to expose the left side of the dissection.  She had very minimal left ovarian tissue.  We identified the ureter and the IP ligament.  We doubly cauterized the IP ligament away from the ureter to remove the left adnexa.  We continued cauterizing and cutting down the broad ligament until we were at the left round ligament.  We cauterized and cut the round ligament.  Again, we opened up the broad ligament to skeletonize on her bladder flap, so we did this by entering into the vesicovaginal space and dissecting the bladder off of the cervix passed the VCare cup.  We doubly cauterized the left ovarian vessels.  We then cauterized and cut the right mesosalpinx to remove the fallopian tube up to the right uteroovarian ligament, cauterized and cut the right uteroovarian ligament, cauterized and cut the right round ligament, continued cauterizing and cutting down the broad ligament until we could skeletonize our vessels on the right; we doubly cauterized these.  We finished our bladder flap dissection.  We then anteflexed the uterus.  We then performed a colpotomy by cutting over the VCare cup until the uterus, cervix, left tube and ovary and right fallopian tube were free in the pelvis.  This was then pulled through the vagina.  We then closed the vaginal cuff with a 2-0 Stratafix, and  a second layer of imbricating sutures was performed.  Copious irrigation was performed.  Hemostasis was confirmed.  We undocked the da Maria Fernanda robot.  We removed the CO2 insufflation, removed the ports, closed the incisions with a 4-0 Vicryl in a buried fashion.  Injected about 3 mL of 0.5% Marcaine with epinephrine subcutaneously into each of the incisions.  The bladder was draining clear urine.  The Sal catheter was removed.  Speculum exam showed there were no vaginal lacerations and the cuff was closed completely.  At this point the procedure was complete.  Sponge, lap and needle counts were correct x 2.  She was taken to the postop recovery in stable condition.      Shine Collazo MD        D: 10/05/2022   T: 10/05/2022   MT: [id number]    Name:     TORREY YEEGARDENIA  MRN:      [id number]        Account:        [id number]   :      1975           Procedure Date: 10/05/2022     Document: [id number]

## 2022-10-08 ENCOUNTER — HOSPITAL ENCOUNTER (EMERGENCY)
Facility: CLINIC | Age: 47
Discharge: HOME | End: 2022-10-09
Attending: EMERGENCY MEDICINE | Admitting: EMERGENCY MEDICINE
Payer: COMMERCIAL

## 2022-10-08 DIAGNOSIS — M62.81 GENERALIZED MUSCLE WEAKNESS: ICD-10-CM

## 2022-10-08 DIAGNOSIS — R25.1 TREMOR: ICD-10-CM

## 2022-10-08 LAB
ANION GAP SERPL CALCULATED.3IONS-SCNC: 9 MMOL/L (ref 3–14)
ATRIAL RATE - MUSE: 77 BPM
BASOPHILS # BLD AUTO: 0 10E3/UL (ref 0–0.2)
BASOPHILS NFR BLD AUTO: 0 %
BUN SERPL-MCNC: 12 MG/DL (ref 7–30)
CALCIUM SERPL-MCNC: 9.2 MG/DL (ref 8.5–10.1)
CHLORIDE BLD-SCNC: 105 MMOL/L (ref 94–109)
CO2 SERPL-SCNC: 20 MMOL/L (ref 20–32)
CREAT SERPL-MCNC: 1.04 MG/DL (ref 0.52–1.04)
DIASTOLIC BLOOD PRESSURE - MUSE: NORMAL MMHG
EOSINOPHIL # BLD AUTO: 0 10E3/UL (ref 0–0.7)
EOSINOPHIL NFR BLD AUTO: 0 %
ERYTHROCYTE [DISTWIDTH] IN BLOOD BY AUTOMATED COUNT: 11.1 % (ref 10–15)
GFR SERPL CREATININE-BSD FRML MDRD: 67 ML/MIN/1.73M2
GLUCOSE BLD-MCNC: 153 MG/DL (ref 70–99)
HCT VFR BLD AUTO: 39.8 % (ref 35–47)
HGB BLD-MCNC: 13.8 G/DL (ref 11.7–15.7)
HOLD SPECIMEN: NORMAL
IMM GRANULOCYTES # BLD: 0.1 10E3/UL
IMM GRANULOCYTES NFR BLD: 1 %
INTERPRETATION ECG - MUSE: NORMAL
LYMPHOCYTES # BLD AUTO: 1.3 10E3/UL (ref 0.8–5.3)
LYMPHOCYTES NFR BLD AUTO: 14 %
MCH RBC QN AUTO: 30.5 PG (ref 26.5–33)
MCHC RBC AUTO-ENTMCNC: 34.7 G/DL (ref 31.5–36.5)
MCV RBC AUTO: 88 FL (ref 78–100)
MONOCYTES # BLD AUTO: 1 10E3/UL (ref 0–1.3)
MONOCYTES NFR BLD AUTO: 11 %
NEUTROPHILS # BLD AUTO: 6.9 10E3/UL (ref 1.6–8.3)
NEUTROPHILS NFR BLD AUTO: 74 %
NRBC # BLD AUTO: 0 10E3/UL
NRBC BLD AUTO-RTO: 0 /100
P AXIS - MUSE: 71 DEGREES
PLATELET # BLD AUTO: 277 10E3/UL (ref 150–450)
POTASSIUM BLD-SCNC: 3.5 MMOL/L (ref 3.4–5.3)
PR INTERVAL - MUSE: 124 MS
QRS DURATION - MUSE: 80 MS
QT - MUSE: 422 MS
QTC - MUSE: 477 MS
R AXIS - MUSE: 77 DEGREES
RBC # BLD AUTO: 4.53 10E6/UL (ref 3.8–5.2)
SODIUM SERPL-SCNC: 134 MMOL/L (ref 133–144)
SYSTOLIC BLOOD PRESSURE - MUSE: NORMAL MMHG
T AXIS - MUSE: 74 DEGREES
VENTRICULAR RATE- MUSE: 77 BPM
WBC # BLD AUTO: 9.3 10E3/UL (ref 4–11)

## 2022-10-08 PROCEDURE — 258N000003 HC RX IP 258 OP 636: Performed by: EMERGENCY MEDICINE

## 2022-10-08 PROCEDURE — 36415 COLL VENOUS BLD VENIPUNCTURE: CPT | Performed by: EMERGENCY MEDICINE

## 2022-10-08 PROCEDURE — 96374 THER/PROPH/DIAG INJ IV PUSH: CPT

## 2022-10-08 PROCEDURE — 80048 BASIC METABOLIC PNL TOTAL CA: CPT | Performed by: EMERGENCY MEDICINE

## 2022-10-08 PROCEDURE — 99285 EMERGENCY DEPT VISIT HI MDM: CPT | Mod: 25

## 2022-10-08 PROCEDURE — 93005 ELECTROCARDIOGRAM TRACING: CPT

## 2022-10-08 PROCEDURE — 85025 COMPLETE CBC W/AUTO DIFF WBC: CPT | Performed by: EMERGENCY MEDICINE

## 2022-10-08 PROCEDURE — 250N000011 HC RX IP 250 OP 636: Performed by: EMERGENCY MEDICINE

## 2022-10-08 PROCEDURE — 96361 HYDRATE IV INFUSION ADD-ON: CPT

## 2022-10-08 RX ORDER — SODIUM CHLORIDE 9 MG/ML
INJECTION, SOLUTION INTRAVENOUS CONTINUOUS
Status: DISCONTINUED | OUTPATIENT
Start: 2022-10-09 | End: 2022-10-09 | Stop reason: HOSPADM

## 2022-10-08 RX ORDER — LORAZEPAM 2 MG/ML
1 INJECTION INTRAMUSCULAR ONCE
Status: COMPLETED | OUTPATIENT
Start: 2022-10-08 | End: 2022-10-08

## 2022-10-08 RX ADMIN — SODIUM CHLORIDE 1000 ML: 9 INJECTION, SOLUTION INTRAVENOUS at 23:10

## 2022-10-08 RX ADMIN — LORAZEPAM 1 MG: 2 INJECTION INTRAMUSCULAR; INTRAVENOUS at 23:10

## 2022-10-08 ASSESSMENT — ACTIVITIES OF DAILY LIVING (ADL): ADLS_ACUITY_SCORE: 35

## 2022-10-09 VITALS
OXYGEN SATURATION: 99 % | RESPIRATION RATE: 16 BRPM | TEMPERATURE: 98.5 F | HEART RATE: 83 BPM | SYSTOLIC BLOOD PRESSURE: 122 MMHG | DIASTOLIC BLOOD PRESSURE: 68 MMHG | BODY MASS INDEX: 19.37 KG/M2 | WEIGHT: 120 LBS

## 2022-10-09 NOTE — ED NOTES
Bed: ED20  Expected date: 10/8/22  Expected time: 10:15 PM  Means of arrival: Ambulance  Comments:   47F post op issues

## 2022-10-09 NOTE — ED TRIAGE NOTES
Pt presents via EMS for Weakness, dizziness, syncope. Weakness, dizziness, syncope after hysterectomy on Thursday, was sent home with vicodin.     c/o full body tremors, alert and talking during episodes, ruled out by neuro as pseudo seizures.   EMS established 20g IV to R AC.

## 2022-10-09 NOTE — ED PROVIDER NOTES
History     Chief Complaint:  Tremors       HPI   Dae Castañeda is a 47 year old female who presents with a few concerns.  Patient reports history of tremor-like activity for many years for which he has been evaluated by neurology.  She reports similar symptoms recently but also new paresthesias in her hands and feet.  She reports having had extensive neurologic evaluation for the symptoms without obvious answer.  She is waiting to be accepted by neurology at the [facility] for further evaluation.  She has also been feeling waves of lightheadedness and dizziness since a robotic assisted laparoscopic hysterectomy on 10/5/2022.  No fevers or increasing abdominal pain or dysuria.  No CP or SOB. She does have some concern for prolapsed bladder based on when she is feeling in the vaginal region.  She reports upcoming OB/GYN follow-up in 2 days time after the weekend for evaluation of this.    ROS:  Review of Systems  A 10 point ROS was obtained and negative except as noted here and in HPI      Allergies:  No Clinical Screening - See Comments  Codeine  Codeine  Gabapentin  Latex  Paroxetine  Paroxetine  Venlafaxine     Medications:    acetaminophen (TYLENOL) 325 MG tablet  drospirenone-ethinyl estradiol (ELIS) 3-0.02 MG tablet  hydrOXYzine (ATARAX) 25 MG tablet  hydrOXYzine (ATARAX) 25 MG tablet  LORazepam (ATIVAN) 0.5 MG tablet  ondansetron (ZOFRAN ODT) 4 MG ODT tab  ondansetron (ZOFRAN ODT) 4 MG ODT tab  ondansetron (ZOFRAN-ODT) 4 MG ODT tab  polyethylene glycol (MIRALAX) 17 GM/Dose powder  senna-docusate (SENOKOT-S/PERICOLACE) 8.6-50 MG tablet  senna-docusate (SENOKOT-S/PERICOLACE) 8.6-50 MG tablet  senna-docusate (SENOKOT-S/PERICOLACE) 8.6-50 MG tablet        Past Medical History:    Past Medical History:   Diagnosis Date     Abnormal involuntary movement 1/31/2017     Chromosome abnormality 2008     Deviated nasal septum 2000     Female infertility associated with anovulation      H/O  electroencephalogram 1/31/2017     H/O magnetic resonance imaging of brain and brain stem 3/6/2017     Major depressive disorder, single episode, mild (H) 1995     Myalgia and myositis, unspecified 1995     Ruptured ovarian cyst 1/2011     Serotonin syndrome      Supervision of other normal pregnancy        Past Surgical History:    Past Surgical History:   Procedure Laterality Date     ASPIRATION AND CURETTAGE  3/2008    Missed AB following in vitro fertilzation/implantation     DAVINCI HYSTERECTOMY TOTAL, BILATERAL SALPINGO-OOPHORECTOMY, COMBINED N/A 10/5/2022    Procedure: ROBOTIC ASSISTED TOTAL HYSTERECTOMY, LEFT SALPINGO-OOPHORECTOMY, RIGHT SALPINGECTOMY,LYSIS OF ADHESIONS;  Surgeon: Shine Collazo MD;  Location:  OR     GYN SURGERY      ruptured ovarian cyst     HC REPAIR OF NASAL SEPTUM  4/2000     LAPAROSCOPIC APPENDECTOMY N/A 8/23/2021    Procedure: LAPAROSCOPIC APPENDECTOMY;  Surgeon: Nazario Contreras MD;  Location:  OR     LAPAROSCOPY  1/2011    for drainage intra-abdominal hematoma s/p ruptured right ovarian cyst        Family History:    family history includes Breast Cancer (age of onset: 61) in her mother; C.A.D. in her father and maternal grandfather; Depression in her father; Diabetes in her maternal grandfather and maternal grandmother; Hypertension in her father; Lipids in her mother; Medical History Unknown in her daughter; Prostate Cancer in her paternal grandfather; Thyroid Disease in her mother; Tremor in her maternal grandfather.    Social History:   reports that she has never smoked. She has never used smokeless tobacco. She reports current alcohol use. She reports that she does not use drugs.  PCP: Nataliya Quiroz MD     Physical Exam     Patient Vitals for the past 24 hrs:   BP Temp Temp src Pulse Resp SpO2 Weight   10/09/22 0000 122/68 -- -- 83 16 99 % --   10/08/22 2223 112/59 98.5  F (36.9  C) Oral 87 16 100 % 54.4 kg (120 lb)        Physical Exam  VS: Reviewed  per above  HENT: Mucous membranes moist, no nuchal rigidity  EYES: sclera anicteric  CV: Rate as noted, regular rhythm.   RESP: Effort normal. Breath sounds are normal bilaterally.  GI: no tenderness/rebound/guarding, not distended.  Laparoscopic surgical incision sites are clean dry and intact.  Chaperone pelvic exam: No evidence of prolapse tissue in vaginal vault on superficial speculum exam.  NEURO: GCS 15, cranial nerves II through XII are intact, 5 out of 5 strength in all 4 extremities, sensation is intact light touch in all 4 extremities.  MSK: No deformity of the extremities  SKIN: Warm and dry      Emergency Department Course   ECG:  ECG results from 10/08/22   EKG 12-lead, tracing only     Value    Systolic Blood Pressure     Diastolic Blood Pressure     Ventricular Rate 77    Atrial Rate 77    MD Interval 124    QRS Duration 80        QTc 477    P Axis 71    R AXIS 77    T Axis 74    Interpretation ECG      Sinus rhythm  Possible Left atrial enlargement  Borderline ECG    Confirmed by GENERATED REPORT, COMPUTER ([id number]),  YFN ESCOBEDO ([id number]) on 10/8/2022 11:23:57 PM       Laboratory:  Labs Ordered and Resulted from Time of ED Arrival to Time of ED Departure   BASIC METABOLIC PANEL - Abnormal       Result Value    Sodium 134      Potassium 3.5      Chloride 105      Carbon Dioxide (CO2) 20      Anion Gap 9      Urea Nitrogen 12      Creatinine 1.04      Calcium 9.2      Glucose 153 (*)     GFR Estimate 67     CBC WITH PLATELETS AND DIFFERENTIAL    WBC Count 9.3      RBC Count 4.53      Hemoglobin 13.8      Hematocrit 39.8      MCV 88      MCH 30.5      MCHC 34.7      RDW 11.1      Platelet Count 277      % Neutrophils 74      % Lymphocytes 14      % Monocytes 11      % Eosinophils 0      % Basophils 0      % Immature Granulocytes 1      NRBCs per 100 WBC 0      Absolute Neutrophils 6.9      Absolute Lymphocytes 1.3      Absolute Monocytes 1.0      Absolute Eosinophils 0.0      Absolute Basophils  0.0      Absolute Immature Granulocytes 0.1      Absolute NRBCs 0.0            Emergency Department Course:             Reviewed:  I reviewed nursing notes, vitals and past medical history    Assessments:   I obtained history and examined the patient as noted above.    I rechecked the patient and explained findings.       Interventions:  Medications   0.9% sodium chloride BOLUS (0 mLs Intravenous Stopped 10/9/22 0010)     Followed by   sodium chloride 0.9% infusion (has no administration in time range)   LORazepam (ATIVAN) injection 1 mg (1 mg Intravenous Given 10/8/22 2310)        Disposition:  The patient was discharged to home.     Impression & Plan        Medical Decision Making:  Patient presents to the ER for evaluation of increasing tremor frequency from baseline as well as generalized weakness and near syncope in the setting of recent laparoscopic hysterectomy a few days ago.  Vital signs reassuring.  Abdominal exam is benign.  Chaperoned pelvic exam does not show evidence of prolapsed tissue, as was patient's concern.  No focal neurodeficits.  She reports having seen neurology previously for her tremor like episodes and they did not believe she was suffering from seizures or other sinister neurologic process.  Basic labs today do not show concerning anemia or electrolyte derangement.  ECG is sinus rhythm without specific ischemic change.  She was given IV fluids.  She ambulated to the bathroom independently with steady gait.  No chest pain or shortness of breath or hypoxemia or tachycardia to suggest occult PE.  No fevers or leukocytosis to suggest occult infectious process.  At this time no emergent condition identified.  Patient was feeling better after IV Ativan and fluids.  Patient referred to OB/GYN for recheck after the weekend at a prescheduled appointment.  Return precaution discussed prior to discharge.      Diagnosis:    ICD-10-CM    1. Tremor  R25.1    2. Generalized muscle weakness  M62.81          Discharge Medications:  Discharge Medication List as of 10/9/2022 12:10 AM           10/8/2022   Melchor Zambrano MD Lindenbaum, Elan, MD  10/09/22 0111

## 2022-10-15 ENCOUNTER — HEALTH MAINTENANCE LETTER (OUTPATIENT)
Age: 47
End: 2022-10-15

## 2023-06-01 ENCOUNTER — HEALTH MAINTENANCE LETTER (OUTPATIENT)
Age: 48
End: 2023-06-01

## 2023-08-14 NOTE — PROGRESS NOTES
[facility] Follow Up Note    CHIEF COMPLAINT:  High risk breast cancer    HISTORY OF PRESENT ILLNESS:  Dae Castañeda is a 48 year old female who is seen in follow up for skin changes and increased lifetime risk of breast cancer.    I last saw Jyoti on 2/7/2020 when she had noticed some dimpling on her right breast skin.  She had diagnostic imaging with mammogram and ultrasound and breast MRI and ultimately went on to have an ultrasound-guided core needle biopsy of a 1.3 cm hypoechoic nodule on the right breast at 12:00, 3 cm from nipple which revealed a benign fibroadenoma.     Her lifetime risk for breast cancer is 26.5% using the LORNEZO score calculator.  High risk screening alternating mammogram and breast MRI was recommended.  Her last breast imaging was in January 2021 and was benign at that time.     Today she reports she has had a lot going on with her own health in the past two years and her daughters. She has not had breast imaging since 2021 due to this. She had a precancerous duodenal mass removed endoscopically, a hysterectomy and appendectomy. She has also been dealing with a tremor and is seeing neurology for this. Her daughter has been having confusion migraines as well.     She reports she has noticed a new change in the contour that is on her upper right breast. She noticed it a few weeks ago.     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 .      Family history of breast cancer: Yes - mother diagnosed at 59 and had two separate types of cancer (IDC and a separate TN invasive carcinoma - this was not visible on mammo or MRI and only on physical exam) and maternal grandmother  Family history of ovarian cancer:  No  Family history of colon cancer: No  Family history of prostate cancer: No    REVIEW OF  SYSTEMS:  Constitutional:  Negative for chills, fatigue, fever and weight change.  Eyes:  Negative for blurred vision, eye pain and photophobia.  ENT:  Negative for hearing problems, ENT pain, congestion, rhinorrhea, epistaxis, hoarseness and dental problems.  Cardiovascular:  Negative for chest pain, palpitations, tachycardia, orthopnea and edema.  Respiratory:  Negative for cough, dyspnea and hemoptysis.  Gastrointestinal:  Negative for abdominal pain, heartburn, constipation, diarrhea and stool changes.  Musculoskeletal:  Negative for arthralgias, back pain and myalgias.  Integumentary/Breast:  See HPI.    Past Medical History:   Diagnosis Date    Abnormal involuntary movement 1/31/2017    Author: Shaheed Freeman MD Service: Hospitalist Author Type: Physician    Filed: 5/14/2015  8:10 PM Note Time: 5/14/2015 10:35 AM Status: Signed   : Shaheed Freeman MD (Physician)     Expand All Collapse All    DATE OF ADMISSION:  05/12/2015      DATE OF DISCHARGE:  05/14/2015      PRIMARY CARE PHYSICIAN:  Nancy Brady MD      CHIEF COMPLAINT:  Involuntary jerky movements.        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 Orders    EEG        Expand All Collapse All    ONE-HOUR VIDEO ELECTROENCEPHALOGRAM       ORDERING PHYSICIAN:  Lito Jasmine MD       EEG #:        This EEG was done on [facility] with video monitoring recording of the spell.  EEG demonstrated some sharp wave activity bilaterally that is not rhythmic.  The patient otherwise had a spell that started with tingling and d    H/O magnetic resonance imaging of brain and brain stem 3/6/2017    2/13/2017 normal brain mri    Major depressive disorder, single episode, mild (H) 1995    chronic    Myalgia and myositis, unspecified 1995     episodic low back and hip pain    Ruptured ovarian cyst 2011    right, requiring laparoscopy for drainage intra-abdominal hematoma    Serotonin syndrome     SSRI related (paroxetine and venlaxine), also triggered by anesthetic agents in     Supervision of other normal pregnancy      - vaginal, pergonal for infertility       Past Surgical History:   Procedure Laterality Date    ASPIRATION AND CURETTAGE  3/2008    Missed AB following in vitro fertilzation/implantation    DAVINCI HYSTERECTOMY TOTAL, BILATERAL SALPINGO-OOPHORECTOMY, COMBINED N/A 10/5/2022    Procedure: ROBOTIC ASSISTED TOTAL HYSTERECTOMY, LEFT SALPINGO-OOPHORECTOMY, RIGHT SALPINGECTOMY,LYSIS OF ADHESIONS;  Surgeon: Shine Collazo MD;  Location:  OR    GYN SURGERY      ruptured ovarian cyst    HC REPAIR OF NASAL SEPTUM  2000    LAPAROSCOPIC APPENDECTOMY N/A 2021    Procedure: LAPAROSCOPIC APPENDECTOMY;  Surgeon: Nazario Contreras MD;  Location:  OR    LAPAROSCOPY  2011    for drainage intra-abdominal hematoma s/p ruptured right ovarian cyst       Family History   Problem Relation Age of Onset    Lipids Mother     Thyroid Disease Mother         hypothyroidism    Breast Cancer Mother 61    Depression Father     Hypertension Father     C.A.D. Father          MI age 64    Diabetes Maternal Grandmother         type 2    Diabetes Maternal Grandfather         type 2    C.A.D. Maternal Grandfather         multiple MIs, onset age 50s    Tremor Maternal Grandfather     Prostate Cancer Paternal Grandfather          age 70s    Medical History Unknown Daughter     Glaucoma No family hx of     Macular Degeneration No family hx of        Social History     Tobacco Use    Smoking status: Never    Smokeless tobacco: Never   Substance Use Topics    Alcohol use: Yes       Patient Active Problem List   Diagnosis    Chromosome abnormality    Myalgia and myositis    Female infertility associated with anovulation     CARDIOVASCULAR SCREENING; LDL GOAL LESS THAN 160    Mild Depression  [296.21]      Serotonin syndrome    Myoclonic jerking    Fibromyalgia    H/O electroencephalogram    Abnormal involuntary movement    H/O magnetic resonance imaging of brain and brain stem    Knee pain    Abdominal pain, generalized    Mass of duodenum    Elevated lipase    Acute appendicitis with generalized peritonitis, without gangrene or abscess, unspecified whether perforation present    Muscle twitching     Allergies   Allergen Reactions    No Clinical Screening - See Comments      Other reaction(s): Other, see comments  PN: Don't give medications that increase serotonin level, will end up in ICU.     Codeine      Other reaction(s): Other, see comments  PN:  Nausea    Gabapentin      Serotonin syndrome    Latex      Added based on information entered during case entry, please review and add reactions, type, and severity as needed    Morphine And Related GI Disturbance    Paroxetine      serotonin syndrome, all seratonin drugs    Paroxetine      PN: ho serotonin syndrome per pet    Venlafaxine      serotonin syndrome     Current Outpatient Medications   Medication Sig Dispense Refill    acetaminophen (TYLENOL) 325 MG tablet Take 2 tablets (650 mg) by mouth every 4 hours as needed for other (mild pain) 100 tablet 0    drospirenone-ethinyl estradiol (ELIS) 3-0.02 MG tablet Take 1 tablet by mouth daily      hydrOXYzine (ATARAX) 25 MG tablet Take 1 tablet (25 mg) by mouth every 6 hours as needed for itching or anxiety (with pain, moderate pain) 30 tablet 0    hydrOXYzine (ATARAX) 25 MG tablet Take 1 tablet (25 mg) by mouth 3 times daily as needed for other (pain or muscle twitching) 20 tablet 1    LORazepam (ATIVAN) 0.5 MG tablet Take 0.5 mg by mouth 2 times daily as needed for anxiety      ondansetron (ZOFRAN ODT) 4 MG ODT tab Take 1 tablet (4 mg) by mouth every 8 hours as needed for nausea 4 tablet 0    ondansetron (ZOFRAN ODT) 4 MG ODT tab Take  "1-2 tablets (4-8 mg) by mouth every 8 hours as needed for nausea Dissolve ON the tongue. 10 tablet 0    ondansetron (ZOFRAN-ODT) 4 MG ODT tab Take 1 tablet (4 mg) by mouth every 6 hours as needed for nausea or vomiting 10 tablet 0    polyethylene glycol (MIRALAX) 17 GM/Dose powder Take 17 g by mouth daily as needed for constipation 510 g 0    senna-docusate (SENOKOT-S/PERICOLACE) 8.6-50 MG tablet Take 1-2 tablets by mouth 2 times daily 30 tablet 0    senna-docusate (SENOKOT-S/PERICOLACE) 8.6-50 MG tablet Take 1-2 tablets by mouth 2 times daily Take while on oral narcotics to prevent or treat constipation. 30 tablet 0    senna-docusate (SENOKOT-S/PERICOLACE) 8.6-50 MG tablet Take 1 tablet by mouth 2 times daily as needed for constipation (while on pain medications) 10 tablet 0     Vitals: /72   Pulse 74   Resp 16   Ht 1.676 m (5' 6\")   SpO2 98%   BMI 19.37 kg/m    BMI= Body mass index is 19.37 kg/m .    EXAM:  GENERAL: healthy, alert and no distress   BREAST:  The breasts appear symmetric with no overlying skin changes.  There is a slight contour change when pt is sitting up on the right upper breast. The tissue here feels dense but benign by palpation. The nipples are normal bilaterally.  There is no dimpling or thickening of the skin.  No mass is appreciated in either breast.  The breast tissue is generally very dense centrally and in the upper outer breast.  There is no axillary or supraclavicular lymphadenopathy.  CARDIOVASCULAR:  RRR  RESPIRATORY: nonlabored breathing  NECK: Neck supple. No adenopathy. Thyroid symmetric, normal size  SKIN: No suspicious lesions or rashes  LYMPH: Normal cervical lymph nodes      ASSESSMENT/PLAN:  Dae Castañeda is a 48yof with a change in the contour of her right upper outer breast. On exam, this feels consistent with dense tissue but definitely warrants dedicated breast imaging for further evaluation. Mammogram and US ordered. We discussed that I will call " her with these results and determine timing of MRI. She is high risk and if mammo/US now looks fine - would then recommend MRI in 6 months. If imaging is inconclusive, would do MRI now.           Jazmin Rosales MD  [facility]  [phone]        Please route or send letter to:  Primary Care Provider (PCP) and Referring Provider

## 2023-08-15 ENCOUNTER — OFFICE VISIT (OUTPATIENT)
Dept: SURGERY | Facility: CLINIC | Age: 48
End: 2023-08-15
Payer: COMMERCIAL

## 2023-08-15 VITALS
RESPIRATION RATE: 16 BRPM | HEART RATE: 74 BPM | DIASTOLIC BLOOD PRESSURE: 72 MMHG | OXYGEN SATURATION: 98 % | SYSTOLIC BLOOD PRESSURE: 120 MMHG | BODY MASS INDEX: 19.37 KG/M2 | HEIGHT: 66 IN

## 2023-08-15 DIAGNOSIS — N63.11 MASS OF UPPER OUTER QUADRANT OF RIGHT BREAST: Primary | ICD-10-CM

## 2023-08-15 PROCEDURE — 99213 OFFICE O/P EST LOW 20 MIN: CPT | Performed by: SURGERY

## 2023-08-23 ENCOUNTER — HOSPITAL ENCOUNTER (OUTPATIENT)
Dept: MAMMOGRAPHY | Facility: CLINIC | Age: 48
Discharge: HOME | End: 2023-08-23
Attending: SURGERY
Payer: COMMERCIAL

## 2023-08-23 DIAGNOSIS — N63.11 MASS OF UPPER OUTER QUADRANT OF RIGHT BREAST: ICD-10-CM

## 2023-08-23 PROCEDURE — 77062 BREAST TOMOSYNTHESIS BI: CPT

## 2023-08-23 PROCEDURE — 76642 ULTRASOUND BREAST LIMITED: CPT | Mod: RT

## 2023-08-30 ENCOUNTER — LAB REQUISITION (OUTPATIENT)
Dept: LAB | Facility: CLINIC | Age: 48
End: 2023-08-30
Payer: COMMERCIAL

## 2023-08-30 DIAGNOSIS — L65.9 NONSCARRING HAIR LOSS, UNSPECIFIED: ICD-10-CM

## 2023-08-30 LAB
ALBUMIN SERPL BCG-MCNC: 4.2 G/DL (ref 3.5–5.2)
ALP SERPL-CCNC: 70 U/L (ref 35–104)
ALT SERPL W P-5'-P-CCNC: 18 U/L (ref 0–50)
ANION GAP SERPL CALCULATED.3IONS-SCNC: 12 MMOL/L (ref 7–15)
AST SERPL W P-5'-P-CCNC: 32 U/L (ref 0–45)
BASOPHILS # BLD AUTO: 0 10E3/UL (ref 0–0.2)
BASOPHILS NFR BLD AUTO: 1 %
BILIRUB SERPL-MCNC: 0.2 MG/DL
BUN SERPL-MCNC: 10.8 MG/DL (ref 6–20)
CALCIUM SERPL-MCNC: 9.4 MG/DL (ref 8.6–10)
CHLORIDE SERPL-SCNC: 105 MMOL/L (ref 98–107)
CREAT SERPL-MCNC: 0.8 MG/DL (ref 0.51–0.95)
DEPRECATED HCO3 PLAS-SCNC: 22 MMOL/L (ref 22–29)
EOSINOPHIL # BLD AUTO: 0.1 10E3/UL (ref 0–0.7)
EOSINOPHIL NFR BLD AUTO: 1 %
ERYTHROCYTE [DISTWIDTH] IN BLOOD BY AUTOMATED COUNT: 12.2 % (ref 10–15)
FERRITIN SERPL-MCNC: 100 NG/ML (ref 6–175)
GFR SERPL CREATININE-BSD FRML MDRD: >90 ML/MIN/1.73M2
GLUCOSE SERPL-MCNC: 86 MG/DL (ref 70–99)
HCT VFR BLD AUTO: 40.9 % (ref 35–47)
HGB BLD-MCNC: 13.2 G/DL (ref 11.7–15.7)
IMM GRANULOCYTES # BLD: 0 10E3/UL
IMM GRANULOCYTES NFR BLD: 0 %
IRON BINDING CAPACITY (ROCHE): 348 UG/DL (ref 240–430)
IRON SATN MFR SERPL: 19 % (ref 15–46)
IRON SERPL-MCNC: 66 UG/DL (ref 37–145)
LYMPHOCYTES # BLD AUTO: 1.4 10E3/UL (ref 0.8–5.3)
LYMPHOCYTES NFR BLD AUTO: 35 %
MCH RBC QN AUTO: 29.9 PG (ref 26.5–33)
MCHC RBC AUTO-ENTMCNC: 32.3 G/DL (ref 31.5–36.5)
MCV RBC AUTO: 93 FL (ref 78–100)
MONOCYTES # BLD AUTO: 0.5 10E3/UL (ref 0–1.3)
MONOCYTES NFR BLD AUTO: 13 %
NEUTROPHILS # BLD AUTO: 2.1 10E3/UL (ref 1.6–8.3)
NEUTROPHILS NFR BLD AUTO: 50 %
NRBC # BLD AUTO: 0 10E3/UL
NRBC BLD AUTO-RTO: 0 /100
PLATELET # BLD AUTO: 245 10E3/UL (ref 150–450)
POTASSIUM SERPL-SCNC: 4.3 MMOL/L (ref 3.4–5.3)
PROT SERPL-MCNC: 7.3 G/DL (ref 6.4–8.3)
RBC # BLD AUTO: 4.42 10E6/UL (ref 3.8–5.2)
SODIUM SERPL-SCNC: 139 MMOL/L (ref 136–145)
TRANSFERRIN SERPL-MCNC: 289 MG/DL (ref 200–360)
TSH SERPL DL<=0.005 MIU/L-ACNC: 1.25 UIU/ML (ref 0.3–4.2)
WBC # BLD AUTO: 4.2 10E3/UL (ref 4–11)

## 2023-08-30 PROCEDURE — 84443 ASSAY THYROID STIM HORMONE: CPT | Mod: ORL | Performed by: OBSTETRICS & GYNECOLOGY

## 2023-08-30 PROCEDURE — 85025 COMPLETE CBC W/AUTO DIFF WBC: CPT | Mod: ORL | Performed by: OBSTETRICS & GYNECOLOGY

## 2023-08-30 PROCEDURE — 84466 ASSAY OF TRANSFERRIN: CPT | Mod: ORL | Performed by: OBSTETRICS & GYNECOLOGY

## 2023-08-30 PROCEDURE — 80053 COMPREHEN METABOLIC PANEL: CPT | Mod: ORL | Performed by: OBSTETRICS & GYNECOLOGY

## 2023-08-30 PROCEDURE — 82728 ASSAY OF FERRITIN: CPT | Mod: ORL | Performed by: OBSTETRICS & GYNECOLOGY

## 2023-08-30 PROCEDURE — 83550 IRON BINDING TEST: CPT | Mod: ORL | Performed by: OBSTETRICS & GYNECOLOGY

## 2023-09-28 ENCOUNTER — TELEPHONE (OUTPATIENT)
Dept: SURGERY | Facility: CLINIC | Age: 48
End: 2023-09-28
Payer: COMMERCIAL

## 2023-09-28 DIAGNOSIS — N64.59 SYMPTOMS IN BREAST: ICD-10-CM

## 2023-09-28 DIAGNOSIS — N63.11 MASS OF UPPER OUTER QUADRANT OF RIGHT BREAST: Primary | ICD-10-CM

## 2023-09-28 DIAGNOSIS — Z91.89 AT HIGH RISK FOR BREAST CANCER: ICD-10-CM

## 2023-09-28 NOTE — TELEPHONE ENCOUNTER
Patient had imaging in 8/23 [facility] said ok to wait 6 months for follow up but patient now has a short line pulling skin in on the bottom of her areola R breast.  Is wondering if [facility] would recommend imaging for this    Please call    Phone: [phone]   Message ok

## 2023-09-29 NOTE — TELEPHONE ENCOUNTER
I left a message for Dae stating that Dr. Rosales would like the MRI now. I provided the number to schedule the MRI, as well as our clinic number if she has any other questions.     Sonali Pollard, RN, BSN, PHN  Breast Care Nurse Coordinator  [facility] [place]- [facility] [place]  [phone]

## 2023-09-29 NOTE — TELEPHONE ENCOUNTER
Dae saw Dr. Rosales on 8/15/23 for right breast changes. She had a mammogram and US that was benign on 8/23/23. Dr. Rosales ordered a follow up MRI in 6 months.     Dae sent a [employer] message with new breast symptoms. Will route to Dr. Rosales to advise on a MRI now vs still waiting 6 months.     Sonali Pollard, RN, BSN, PHN  Breast Care Nurse Coordinator  [facility]- [facility] [place]  [phone]

## 2023-10-02 ENCOUNTER — TELEPHONE (OUTPATIENT)
Dept: SURGERY | Facility: CLINIC | Age: 48
End: 2023-10-02
Payer: COMMERCIAL

## 2023-10-02 DIAGNOSIS — F40.240 CLAUSTROPHOBIA: Primary | ICD-10-CM

## 2023-10-02 NOTE — TELEPHONE ENCOUNTER
Patient is scheduled for breast MRI 10/6/23 and needs a medication because she is claustrophobic and for a neurologic issue, she would like a call to discuss what she needs    Patient uses [facility] in [place]    Please call    Phone:  [phone]   Message ok

## 2023-10-02 NOTE — TELEPHONE ENCOUNTER
I called Dae to discuss. She didn't say what neurologic condition she has but that she has a tremor at baseline. She needs something to stop the tremor so she can stay still for the MRI.     She has had a MRI in the past and took 5 mg of Valium. She states that did nothing. She notes that Ativan helps decrease her tremor but she would try a higher dose of Valium as well.     I informed her she will need a  there and back, and she is aware to check in earlier. She will bring the prescription with her and only take when instructed.     I will route to Dr. Rosales to advise and send a prescription to her pharmacy.     Sonali Pollard, RN, BSN, PHN  Breast Care Nurse Coordinator  [facility] [place]  [phone]

## 2023-10-03 RX ORDER — DIAZEPAM 5 MG
5 TABLET ORAL PRN
Qty: 2 TABLET | Refills: 0 | Status: SHIPPED | OUTPATIENT
Start: 2023-10-03

## 2023-10-05 NOTE — TELEPHONE ENCOUNTER
I spoke with patient and asked if she had taken Valium 10mg in the past when having her breast MRI, and she informed me she never took the second valium 5mg tablet that was prescribed to her at the last Breast MRI, so she is willing to try taking Valium 10mg at the beginning of her MRI when the MRI technologist advises her to.    Patient is aware that Dr. Rosales sent the prescription over to her pharmacy and she will pick it up and bring the bottle of medication to her MRI appointment tomorrow afternoon.  Patient also verbalized understanding that she will need a  to and home from the MRI appointment.    Informed patient to call us back with other questions or concerns.  Patient verbalizes understanding and agrees with the plan of care.  Yeimi Hope RN BSN  Breast Nurse Care Coordinator  [facility] [place] [facility] [place]  [phone]

## 2023-10-05 NOTE — TELEPHONE ENCOUNTER
Patient just picked up her prescription for Valium for her MR tomorrow. She is upset that it is not enough to help her and would like to talk to someone about getting more.    Please call asap at: [phone]  Ok to leave VM

## 2023-10-06 ENCOUNTER — HOSPITAL ENCOUNTER (OUTPATIENT)
Dept: MRI IMAGING | Facility: CLINIC | Age: 48
Discharge: HOME | End: 2023-10-06
Attending: SURGERY | Admitting: SURGERY
Payer: COMMERCIAL

## 2023-10-06 DIAGNOSIS — Z91.89 AT HIGH RISK FOR BREAST CANCER: ICD-10-CM

## 2023-10-06 DIAGNOSIS — N64.59 SYMPTOMS IN BREAST: ICD-10-CM

## 2023-10-06 PROCEDURE — 77049 MRI BREAST C-+ W/CAD BI: CPT

## 2023-10-06 PROCEDURE — A9585 GADOBUTROL INJECTION: HCPCS | Performed by: SURGERY

## 2023-10-06 PROCEDURE — 255N000002 HC RX 255 OP 636: Performed by: SURGERY

## 2023-10-06 RX ORDER — GADOBUTROL 604.72 MG/ML
5.5 INJECTION INTRAVENOUS ONCE
Status: COMPLETED | OUTPATIENT
Start: 2023-10-06 | End: 2023-10-06

## 2023-10-06 RX ADMIN — GADOBUTROL 5.5 ML: 604.72 INJECTION INTRAVENOUS at 13:04

## 2023-10-09 ENCOUNTER — TELEPHONE (OUTPATIENT)
Dept: SURGERY | Facility: CLINIC | Age: 48
End: 2023-10-09
Payer: COMMERCIAL

## 2023-10-09 NOTE — TELEPHONE ENCOUNTER
Dr. Rosales would like Dea to come into the clinic to discuss. Niomi is scheduled on 11/7/23 @ 3:45pm at [facility] [place]. Address/Directions provided.     Pt verbalized understanding and agreeable to the plan.     Sonali Pollard, RN, BSN, PHN  Breast Care Nurse Coordinator  [facility] [place]  [phone]

## 2023-10-09 NOTE — CONFIDENTIAL NOTE
Narrative & Impression   EXAM: Bilateral breast MRI with and without contrast, and computer  aided kinetic analysis.      HISTORY/INDICATION: High-risk breast cancer screening. Mother and  grandmother with breast cancer. Additionally, dimple associated with  the RIGHT nipple began 1 week ago.     COMPARISON: Mammogram and ultrasound dated 8/23/2023 and MRI dated  10/17/2019.     TECHNIQUE: Multiplanar, multisequence imaging performed prior to  contrast administration and at multiple time points after contrast  administration. Post-processing including subtractions, MIPs and color  encoding of post contrast dynamic acquisitions.   IV contrast: 5.5 mL Gadavist  SEDATION: None     FINDINGS:  Right Breast:  Breast composition: Extreme fibroglandular tissue  Background parenchymal enhancement: Moderate  No concerning areas of enhancement.  Scattered benign cysts are seen.     Right Axilla: No lymphadenopathy.   Right Internal Mammary Chain: No lymphadenopathy.      Left Breast:   Breast composition: Extreme fibroglandular tissue  Background parenchymal enhancement: Moderate  No concerning areas of enhancement.  Scattered benign cysts are seen.     Left Axilla: No lymphadenopathy.   Left Internal Mammary Chain: No lymphadenopathy.                                                                       IMPRESSION:      1. No concerning areas of enhancement. However, moderate background  parenchymal enhancement could obscure small lesions.  2. Scattered benign cysts in both breasts.  Right Breast: BI-RADS CATEGORY: 2 - Benign Finding(s).   Left Breast: BI-RADS CATEGORY: 2 - Benign Finding(s).      RECOMMENDATION: Continued high risk breast cancer screening.     VENITA SEXTON MD         SYSTEM ID:  [id number]       Will route to Dr. Rosales to advise on Dae's concern with her high risk screening going forward. Dae was informed of the results by JULIOCESAR Mccormick at the [facility] this morning. She was told to  contact Dr. Rosales's office with further questions.    Sonali Pollard, RN, BSN, PHN  Breast Care Nurse Coordinator  [facility] [place]- [facility] [place]  [phone]

## 2023-10-09 NOTE — TELEPHONE ENCOUNTER
Patient received results from her breast MR done last week. She would like to discuss with a nurse. She is not comfortable waiting a full 6 months to have a repeat MR done.    Please call to discuss: [phone]  OK to leave VM

## 2023-10-29 ENCOUNTER — HEALTH MAINTENANCE LETTER (OUTPATIENT)
Age: 48
End: 2023-10-29

## 2023-10-31 ENCOUNTER — TELEPHONE (OUTPATIENT)
Dept: SURGERY | Facility: CLINIC | Age: 48
End: 2023-10-31
Payer: COMMERCIAL

## 2023-10-31 NOTE — TELEPHONE ENCOUNTER
Name of caller: Patient    Reason for Call:  Scheduled for appointment 11/7 to discuss recent MRI results and high risk screening. Patient calls stating that she definitely wants a biopsy and would like to get this on the books now, rather than waiting until her appointment date. She would like to discuss with a nurse and does have pictures of her area of concern if needed    Surgeon:  Dr. Rosales    Recent Surgery:  No    If yes, when & what type:  N/A      Best phone number to reach pt at is: [phone]  Ok to leave a message with medical info? Yes.

## 2023-11-01 NOTE — TELEPHONE ENCOUNTER
I spoke with patient and confirmed she is able to see Dr. Rosales this Friday 11/3/23 @ 12:45 p.m. at  [facility].       Patient has clinic contact information and directions.  Yeimi Hope RN, BSN

## 2023-11-01 NOTE — TELEPHONE ENCOUNTER
Zaida message sent to patient to inform I rescheduled her clinic visit with Dr. Rosales for 11/3/23 at 1:00 p.m. , with a check in time of 12:45 p.m.    I will also call patient later this morning to discuss.    Yeimi Hope, RN BSN  Breast Nurse Care Coordinator  [facility]- [facility] [place]  [phone]

## 2023-11-03 ENCOUNTER — PATIENT OUTREACH (OUTPATIENT)
Dept: ONCOLOGY | Facility: CLINIC | Age: 48
End: 2023-11-03

## 2023-11-03 ENCOUNTER — OFFICE VISIT (OUTPATIENT)
Dept: SURGERY | Facility: CLINIC | Age: 48
End: 2023-11-03
Payer: COMMERCIAL

## 2023-11-03 VITALS
SYSTOLIC BLOOD PRESSURE: 120 MMHG | BODY MASS INDEX: 18.48 KG/M2 | HEART RATE: 83 BPM | OXYGEN SATURATION: 98 % | HEIGHT: 66 IN | DIASTOLIC BLOOD PRESSURE: 74 MMHG | WEIGHT: 115 LBS | RESPIRATION RATE: 16 BRPM

## 2023-11-03 DIAGNOSIS — Z91.89 AT HIGH RISK FOR BREAST CANCER: Primary | ICD-10-CM

## 2023-11-03 PROCEDURE — 99214 OFFICE O/P EST MOD 30 MIN: CPT | Performed by: SURGERY

## 2023-11-03 NOTE — PROGRESS NOTES
Writer received referral to Cancer Risk Management/Genetic Counseling.    Referred for:     genetic testing      Per Referring provider note:Her lifetime risk for breast cancer is 26.5% using the LORENZO score calculator.  High risk screening alternating mammogram and breast MRI was recommended.     Family history of breast cancer: Yes - mother diagnosed at 59 and had two separate types of cancer (IDC and a separate TN invasive carcinoma - this was not visible on mammo or MRI and only on physical exam) and maternal grandmother     Referral reviewed for appropriate plan, and sent to New Patient Scheduling for completion.    Cassandra Mejia, RN, BSN  Oncology New Patient Nurse Navigator   [facility]  [phone]

## 2023-11-03 NOTE — PROGRESS NOTES
[facility] Follow Up Note    CHIEF COMPLAINT:  Right breast changes, high risk breast cancer    HISTORY OF PRESENT ILLNESS:  Dae Castañeda is a 48 year old female who is seen in follow up for skin changes and increased lifetime risk of breast cancer.     She is here to discuss a new indentation on her right breast areola that she recently noticed. She had a breast MRI on 10/6/2023 to further evaluate this which revealed no concerning areas of enhancement but moderate background parenchymal enhancement. She had had diagnostic imaging with mammogram and US prior to the MRI on 8/23/2023 which was also benign. She is very concerned as her mother's present cancer was not visible on mammogram and MRI and had presented as a dimple in her breast. She has not had genetic testing previously.      Her lifetime risk for breast cancer is 26.5% using the LORENZO score calculator.  High risk screening alternating mammogram and breast MRI was recommended.     She had diagnostic imaging with mammogram and ultrasound and breast MRI and ultimately went on to have an ultrasound-guided core needle biopsy of a 1.3 cm hypoechoic nodule on the right breast at 12:00, 3 cm from nipple which revealed a benign fibroadenoma in 2020. She also recently had a precancerous duodenal mass removed endoscopically, a hysterectomy and appendectomy. She has also been dealing with a tremor and is seeing neurology for this.  She had diagnostic imaging with mammogram and ultrasound and breast MRI and ultimately went on to have an ultrasound-guided core needle biopsy of a 1.3 cm hypoechoic nodule on the right breast at 12:00, 3 cm from nipple which revealed a benign fibroadenoma.           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 .      Family history of breast  cancer: Yes - mother diagnosed at 59 and had two separate types of cancer (IDC and a separate TN invasive carcinoma - this was not visible on mammo or MRI and only on physical exam) and maternal grandmother  Family history of ovarian cancer:  No  Family history of colon cancer: No  Family history of prostate cancer: No    REVIEW OF SYSTEMS:  Constitutional:  Negative for chills, fatigue, fever and weight change.  Eyes:  Negative for blurred vision, eye pain and photophobia.  ENT:  Negative for hearing problems, ENT pain, congestion, rhinorrhea, epistaxis, hoarseness and dental problems.  Cardiovascular:  Negative for chest pain, palpitations, tachycardia, orthopnea and edema.  Respiratory:  Negative for cough, dyspnea and hemoptysis.  Gastrointestinal:  Negative for abdominal pain, heartburn, constipation, diarrhea and stool changes.  Musculoskeletal:  Negative for arthralgias, back pain and myalgias.  Integumentary/Breast:  See HPI.    Past Medical History:   Diagnosis Date    Abnormal involuntary movement 1/31/2017    Author: Shaheed Freeman MD Service: Hospitalist Author Type: Physician    Filed: 5/14/2015  8:10 PM Note Time: 5/14/2015 10:35 AM Status: Signed   : Shaheed Freeman MD (Physician)     Expand All Collapse All    DATE OF ADMISSION:  05/12/2015      DATE OF DISCHARGE:  05/14/2015      PRIMARY CARE PHYSICIAN:  Nancy Brady MD      CHIEF COMPLAINT:  Involuntary jerky movements.        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 Orders    EEG        Expand All Collapse All    ONE-HOUR VIDEO ELECTROENCEPHALOGRAM       ORDERING PHYSICIAN:  Lito Jasmine MD       EEG #:        This EEG was done on [facility] with video monitoring recording of the spell.  EEG demonstrated some  sharp wave activity bilaterally that is not rhythmic.  The patient otherwise had a spell that started with tingling and d    H/O magnetic resonance imaging of brain and brain stem 3/6/2017    2017 normal brain mri    Major depressive disorder, single episode, mild (H)     chronic    Myalgia and myositis, unspecified     episodic low back and hip pain    Ruptured ovarian cyst 2011    right, requiring laparoscopy for drainage intra-abdominal hematoma    Serotonin syndrome     SSRI related (paroxetine and venlaxine), also triggered by anesthetic agents in     Supervision of other normal pregnancy      - vaginal, pergonal for infertility       Past Surgical History:   Procedure Laterality Date    ASPIRATION AND CURETTAGE  3/2008    Missed AB following in vitro fertilzation/implantation    DAVINCI HYSTERECTOMY TOTAL, BILATERAL SALPINGO-OOPHORECTOMY, COMBINED N/A 10/5/2022    Procedure: ROBOTIC ASSISTED TOTAL HYSTERECTOMY, LEFT SALPINGO-OOPHORECTOMY, RIGHT SALPINGECTOMY,LYSIS OF ADHESIONS;  Surgeon: Shine Collazo MD;  Location:  OR    GYN SURGERY      ruptured ovarian cyst    HC REPAIR OF NASAL SEPTUM  2000    LAPAROSCOPIC APPENDECTOMY N/A 2021    Procedure: LAPAROSCOPIC APPENDECTOMY;  Surgeon: Nazario Contreras MD;  Location:  OR    LAPAROSCOPY  2011    for drainage intra-abdominal hematoma s/p ruptured right ovarian cyst       Family History   Problem Relation Age of Onset    Lipids Mother     Thyroid Disease Mother         hypothyroidism    Breast Cancer Mother 61    Depression Father     Hypertension Father     C.A.D. Father          MI age 64    Diabetes Maternal Grandmother         type 2    Diabetes Maternal Grandfather         type 2    C.A.D. Maternal Grandfather         multiple MIs, onset age 50s    Tremor Maternal Grandfather     Prostate Cancer Paternal Grandfather          age 70s    Medical History Unknown Daughter     Glaucoma No family hx of      Macular Degeneration No family hx of        Social History     Tobacco Use    Smoking status: Never    Smokeless tobacco: Never   Substance Use Topics    Alcohol use: Yes       Patient Active Problem List   Diagnosis    Chromosome abnormality    Myalgia and myositis    Female infertility associated with anovulation    CARDIOVASCULAR SCREENING; LDL GOAL LESS THAN 160    Mild Depression  [296.21]      Serotonin syndrome    Myoclonic jerking    Fibromyalgia    H/O electroencephalogram    Abnormal involuntary movement    H/O magnetic resonance imaging of brain and brain stem    Knee pain    Abdominal pain, generalized    Mass of duodenum    Elevated lipase    Acute appendicitis with generalized peritonitis, without gangrene or abscess, unspecified whether perforation present    Muscle twitching     Allergies   Allergen Reactions    No Clinical Screening - See Comments      Other reaction(s): Other, see comments  PN: Don't give medications that increase serotonin level, will end up in ICU.     Codeine      Other reaction(s): Other, see comments  PN:  Nausea    Gabapentin      Serotonin syndrome    Latex      Added based on information entered during case entry, please review and add reactions, type, and severity as needed    Morphine And Related GI Disturbance    Paroxetine      serotonin syndrome, all seratonin drugs    Paroxetine      PN: ho serotonin syndrome per pet    Venlafaxine      serotonin syndrome     Current Outpatient Medications   Medication Sig Dispense Refill    acetaminophen (TYLENOL) 325 MG tablet Take 2 tablets (650 mg) by mouth every 4 hours as needed for other (mild pain) 100 tablet 0    diazepam (VALIUM) 5 MG tablet Take 1 tablet (5 mg) by mouth as needed for anxiety (Must have  there and back) 2 tablet 0    drospirenone-ethinyl estradiol (ELIS) 3-0.02 MG tablet Take 1 tablet by mouth daily      hydrOXYzine (ATARAX) 25 MG tablet Take 1 tablet (25 mg) by mouth every 6 hours as needed for  itching or anxiety (with pain, moderate pain) 30 tablet 0    hydrOXYzine (ATARAX) 25 MG tablet Take 1 tablet (25 mg) by mouth 3 times daily as needed for other (pain or muscle twitching) 20 tablet 1    LORazepam (ATIVAN) 0.5 MG tablet Take 0.5 mg by mouth 2 times daily as needed for anxiety      ondansetron (ZOFRAN ODT) 4 MG ODT tab Take 1 tablet (4 mg) by mouth every 8 hours as needed for nausea 4 tablet 0    ondansetron (ZOFRAN ODT) 4 MG ODT tab Take 1-2 tablets (4-8 mg) by mouth every 8 hours as needed for nausea Dissolve ON the tongue. 10 tablet 0    ondansetron (ZOFRAN-ODT) 4 MG ODT tab Take 1 tablet (4 mg) by mouth every 6 hours as needed for nausea or vomiting 10 tablet 0    polyethylene glycol (MIRALAX) 17 GM/Dose powder Take 17 g by mouth daily as needed for constipation 510 g 0    senna-docusate (SENOKOT-S/PERICOLACE) 8.6-50 MG tablet Take 1-2 tablets by mouth 2 times daily 30 tablet 0    senna-docusate (SENOKOT-S/PERICOLACE) 8.6-50 MG tablet Take 1-2 tablets by mouth 2 times daily Take while on oral narcotics to prevent or treat constipation. 30 tablet 0    senna-docusate (SENOKOT-S/PERICOLACE) 8.6-50 MG tablet Take 1 tablet by mouth 2 times daily as needed for constipation (while on pain medications) 10 tablet 0     Vitals: There were no vitals taken for this visit.  BMI= There is no height or weight on file to calculate BMI.    EXAM:  GENERAL: healthy, alert and no distress   BREAST:  The breasts appear symmetric with no overlying skin changes.  There is a mild indentation visible with patient in sitting position on the right areola from 5-7:00 and is a slight skin line. The nipples are normal bilaterally.   No mass is appreciated in either breast.  The breast tissue is generally very dense centrally.  There is no axillary or supraclavicular lymphadenopathy.  CARDIOVASCULAR:  RRR  RESPIRATORY: nonlabored breathing  NECK: Neck supple. No adenopathy. Thyroid symmetric, normal size  SKIN: No suspicious  lesions or rashes  LYMPH: Normal cervical lymph nodes      ASSESSMENT/PLAN:  Dae MCCLENDON Haroon Castañeda is a 48yof with increased lifetime risk for breast cancer and a new change with slight skin indentation on the right areola from 5-7:00. There is no palpable mass here. We reviewed her recent MRI together which does not reveal any area of concern on radiology review or my review. I understand her concern related to breast cancer as her mother's breast cancer was not diagnosed until she had a needle biopsy. We discussed options with the change in her areolar contour. We discussed close follow up in 3 months vs surgical excisional biopsy/retroareolar ductal excision. 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 She is comfortable with a 3 month follow up exam, sooner with any changes. We also discussed referral for genetic counseling given her family history. Orders were placed.           Jazmin Rosales MD  [facility]  [phone]        Please route or send letter to:  Primary Care Provider (PCP) and Referring Provider

## 2024-03-20 ENCOUNTER — VIRTUAL VISIT (OUTPATIENT)
Dept: ONCOLOGY | Facility: CLINIC | Age: 49
End: 2024-03-20
Attending: SURGERY
Payer: COMMERCIAL

## 2024-03-20 DIAGNOSIS — Z80.0 FAMILY HISTORY OF MALIGNANT NEOPLASM OF COLON: ICD-10-CM

## 2024-03-20 DIAGNOSIS — Z80.3 FAMILY HISTORY OF MALIGNANT NEOPLASM OF BREAST: Primary | ICD-10-CM

## 2024-03-20 PROCEDURE — 96040 HC GENETIC COUNSELING, EACH 30 MINUTES: CPT | Mod: GT | Performed by: GENETIC COUNSELOR, MS

## 2024-03-20 NOTE — LETTER
2024    Dae Castañeda  [address] [address]      Dear Dae,    It was a pleasure speaking with you over video on 3/20/2024. Here is a copy of the progress note from our discussion. If you have any additional questions, please feel free to call.    Referring Provider: Jazmin Rosales MD    Presenting Information:   I met with Dae for her video genetic counseling visit, through the Cancer Risk Management Program, to discuss her family history of breast and colon cancer. Today we reviewed this history, cancer screening recommendations, and available genetic testing options.    Personal History:  Dae is a 48 year old year old female. She does not have any personal history of cancer. She had her first menstrual period at age 16, her first child at age 29, and completed menopause at age 46. Dae had her left ovary, fallopian tubes and uterus removed at age 46 due to a pelvic congestion syndrome. She reports that she just started taking hormone replacement therapy.      Her most recent mammogram in 2023 was normal, with a few cysts. Dae began having colonoscopies at the age of 46. Her most recent colonoscopy in 2021 was normal and follow-up was recommended in 10 years. She does not regularly do any other cancer screening at this time.     Family History: (Please see scanned pedigree for detailed family history information)  Dae's mother was diagnosed with breast cancer at age 59.  Dae's maternal grandmother was diagnosed with breast cancer at age 60 and  at an unknown age.  Dae's paternal grandfather  from colon cancer at age 55.  There is no other reported family history of cancer on either side of the family. However, Dae's mother had 4 brothers, which can limit the amount of breast cancer that we see in a family with numerous males.  Her maternal ethnicity is [place] and [place]. Her paternal ethnicity is Citizen of the [place]. Dae's maternal grandfather  is 100% Ashkenazi Mu-ism. There is no reported consanguinity.    Discussion:  Dae's family history of breast and colon cancer is suggestive of a hereditary cancer syndrome.  We reviewed the features of sporadic, familial, and hereditary cancers. We discussed that mutations in either BRCA1 or BRCA2 could be possible hereditary explanations for her family history of cancer. Mutations in the BRCA1 or BRCA2 gene are known to cause Hereditary Breast and Ovarian Cancer Syndrome (HBOC). HBOC typically presents with multiple family members diagnosed with breast cancer before age 50 and/or ovarian cancer. Other cancer risks associated with HBOC include male breast cancer, prostate cancer, pancreatic cancer, and melanoma.  We discussed the natural history and genetics of hereditary cancer. A detailed handout regarding hereditary cancer, along with the other information we discussed, will be mailed to Dae at the end of our appointment today and can be found in the after visit summary. Topics included: inheritance pattern, cancer risks, cancer screening recommendations, and also risks, benefits and limitations of testing.  Based on her personal and family history, Dae meets current National Comprehensive Cancer Network (NCCN) criteria for genetic testing of BRCA1/2 along with other high-penetrance breast cancer susceptibility genes (I.e. CDH1, PALB2, PTEN, and TP53). NCCN states that anyone with a grandparent that is 100% Ashkenazi Mu-ism, along with a family history of breast cancer, should have genetic testing given the increased likelihood of having a hereditary cancer syndrome in this population.  We discussed that there are additional genes that could cause increased risk for breast and/or colon cancer. As many of these genes present with overlapping features in a family and accurate cancer risk cannot always be established based upon the pedigree analysis alone, it would be reasonable for Dae to consider panel  genetic testing to analyze multiple genes at once.  We discussed that we ideally prefer to test someone affected with cancer first as they are the most informative to test. However, Dae stated that genetic testing has been discussed with her mother but she is not interested in pursuing it. As such, Dae should pursue genetic testing to better understand her risk for hereditary cancer.  Genetic testing is available for BRCA1/2 as part of the patient's core panel. This will then be automatically reflexed to [facility]'s CancerNext panel.  Genetic testing is available for 34 genes associated with hereditary cancer: CancerNext (APC, ZAK, AXIN2, BARD1, BMPR1A, BRCA1, BRCA2, BRIP1, CDH1, CDK4, CDKN2A, CHEK2, DICER1, EPCAM, GREM1, HOXB13, MLH1, MSH2, MSH3, MSH6, MUTYH, NF1, NTHL1, PALB2, PMS2, POLD1, POLE, PTEN, RAD51C, RAD51D SMAD4, SMARCA4, STK11, and TP53).  We discussed that many of the genes in the CancerNext panel are associated with specific hereditary cancer syndromes and published management guidelines: Hereditary Breast and Ovarian Cancer syndrome (BRCA1, BRCA2), Del Cid syndrome (MLH1, MSH2, MSH6, PMS2, EPCAM), Familial Adenomatous Polyposis (APC), Hereditary Diffuse Gastric Cancer (CDH1), Familial Atypical Multiple Mole Melanoma syndrome (CDK4, CDKN2A), Juvenile Polyposis syndrome (BMPR1A, SMAD4), Cowden syndrome (PTEN), Li Fraumeni syndrome (TP53), Peutz-Jeghers syndrome (STK11), MUTYH Associated Polyposis (MUTYH), and Neurofibromatosis type 1 (NF1).   The ZAK, AXIN2, BRIP1, CHEK2, GREM1, MSH3, NTHL1, PALB2, POLD1, POLE, RAD51C, and RAD51D genes are associated with increased cancer risk and have published management guidelines for certain cancers.    The remaining genes (BARD1, DICER1, HOXB13, and SMARCA4) are associated with increased cancer risk and may allow us to make medical recommendations when mutations are identified.    Dae stated that she would prefer to submit a saliva kit for her genetic testing.  [facility] will send a kit directly to her home with directions on how to collect a saliva sample. We discussed that there is a small chance for sample failure due to contamination of the sample. To help minimize this, she should follow the directions that are sent with the kit. Dae verbalized understanding of this. Once the sample is collected, she will send it to [facility] using the return envelope and prepaid shipping label.   Verbal consent was given over video and written on the consent form. Turnaround time is approximately 4 weeks once the lab receives the sample.  Should Dae have any questions regarding the billing for her genetic testing, she should visit [facility]'s billing website at [email or web address] or call them at [phone].  Medical Management: For Dae, we reviewed that the information from genetic testing may determine:  additional cancer screening for which Dae may qualify (i.e. mammogram and breast MRI, more frequent colonoscopies, more frequent dermatologic exams, etc.),  options for risk reducing surgeries Dae could consider (i.e. bilateral mastectomy, surgery to remove her ovaries and/or uterus, etc.),    and targeted chemotherapies if she were to develop certain cancers in the future (i.e. immunotherapy for individuals with Del Cid syndrome, PARP inhibitors, etc.).   These recommendations and possible targeted chemotherapies will be discussed in detail once genetic testing is completed.     Plan:  1) Today Dae elected to proceed with BRCA1/2 testing with automatic reflex to [facility]'s CancerNext panel.  2) A copy of the consent form and the after visit summary will be sent to [facility].  3) This information should be available in approximately 4 weeks, once the lab receives the sample.  4) I will call [facility] with the results once they become available.    Time spent on video: 35 minutes    Joseph Oscar MS, [facility]  Licensed, Certified Genetic Counselor

## 2024-03-20 NOTE — PATIENT INSTRUCTIONS
Assessing Cancer Risk  Cancer is a common diagnosis which impacts many families.  Individuals may develop cancer due to environmental factors (such as exposures and lifestyle), aging, genetic predisposition, or a combination of these factors.      Only about 5-10% of cancers are thought to be due to an inherited cancer susceptibility gene.    These families often have:  Several people with the same or related types of cancer  Cancers diagnosed at a young age (before age 50)  Individuals with more than one primary cancer  Multiple generations of the family affected with cancer    Comprehensive Breast and Gynecologic Cancer Panel  We each inherit two copies of every gene in our bodies: one from our mother, and one from our father. Each gene has a specific job to do.  When a gene has a mistake or  mutation  in it, it does not work like it should.     Some people may be candidates for genetic testing of more than one gene.  For these families, genetic testing using a cancer panel may be offered. These panels will test different genes at once known to increase the risk for breast, ovarian, uterine, and/or other cancers.    This handout will review common hereditary breast and gynecologic cancer syndromes. The genes that will be discussed in this handout are: ZAK, BRCA1, BRCA2, BRIP1, CDH1, CHEK2, MLH1, MSH2, MSH6, PMS2, EPCAM, PTEN, PALB2, RAD51C, RAD51D, and TP53.    The purpose of this handout is to serve as a brief summary of the breast and gynecologic cancer risk genes that have published clinical management guidelines for individuals who are found to carry a mutation. Inheriting a mutation does not mean a person will develop cancer, but it does significantly increase their risk above the general population risk.     ______________________________________________________________________________    Hereditary Breast and Ovarian Cancer Syndrome (BRCA1 and BRCA2)  A single mutation in one of the copies of BRCA1 or  BRCA2 increases the risk for breast and ovarian cancer, among others.  The risk for pancreatic cancer and melanoma may also be slightly increased in some families.  The chart below shows the chance that someone with a BRCA mutation would develop cancer in his or her lifetime1,2,3,4.       Lifetime Cancer Risks    General Population BRCA1  BRCA2   Breast  12% >60% >60%   Ovarian  1-2% 39-58% 13-29%   Prostate 12% 7-26% 19-61%   Male Breast 0.1% 0.2-1.2% 1.8-7.1%   Pancreas 1-2% Up to 5% 5-10%     A person s ethnic background is also important to consider, as individuals of Ashkenazi Restorationist ancestry have a higher chance of having a BRCA gene mutation.  There are three BRCA mutations that occur more frequently in this population.      Del Cid Syndrome (MLH1, MSH2, MSH6, PMS2, and EPCAM)  Currently five genes are known to cause Del Cid Syndrome: MLH1, MSH2, MSH6, PMS2, and EPCAM.  A single mutation in one of the Del Cid Syndrome genes increases the risk for colon, endometrial, ovarian, and stomach cancers.  Other cancers that occur less commonly in Del Cid Syndrome include urinary tract, skin, and brain cancers.  The chart below shows the chance that a person with Del Cid syndrome would develop cancer in his or her lifetime5.      Lifetime Cancer Risks    General Population Del Cid Syndrome   Colon 5% 10-61%   Endometrial 3% 13-57%   Ovarian 1-2% 1-38%   Stomach <1% 1-9%   *Cancer risk varies depending on Del Cid syndrome gene found      Cowden Syndrome (PTEN)  Cowden syndrome is a hereditary condition that increases the risk for breast, thyroid, endometrial, colon, and kidney cancer.  Cowden syndrome is caused by a mutation in the PTEN gene.  A single mutation in one of the copies of PTEN causes Cowden syndrome and increases cancer risk.  The chart below shows the chance that someone with a PTEN mutation would develop cancer in their lifetime6,7.  Other benign features seen in some individuals with Cowden syndrome include benign  skin lesions (facial papules, keratoses, lipomas), learning disability, autism, thyroid nodules, colon polyps, and larger head size.     Lifetime Cancer Risks    General Population Cowden   Breast 12% 40-60%*   Thyroid 1% Up to 38%   Renal 1-2% Up to 35%   Endometrial 3% Up to 28%   Colon 5% Up to 9%   Melanoma 2-3% Up to 6%   *Emerging data suggests the risk for breast cancer could be greater than 60%               Li-Fraumeni Syndrome (TP53)  Li-Fraumeni Syndrome (LFS) is a cancer predisposition syndrome caused by a mutation in the TP53 gene. A single mutation in one of the copies of TP53 increases the risk for multiple cancers. Individuals with LFS are at an increased risk for developing cancer at a young age. The lifetime risk for development of a LFS-associated cancer is 50% by age 30 and 90% by age 60.   Core Cancers: Sarcomas, Breast, Brain, Lung, Leukemias/Lymphomas, Adrenocortical carcinomas  Other Cancers: Gastrointestinal, Thyroid, Skin, Genitourinary       Hereditary Diffuse Gastric Cancer (CDH1)  Currently, one gene is known to cause hereditary diffuse gastric cancer (HDGC): CDH1.  Individuals with HDGC are at increased risk for diffuse gastric cancer and lobular breast cancer. Of people diagnosed with HDGC, 30-50% have a mutation in the CDH1 gene.  This suggests there are likely other genes that may cause HDGC that have not been identified yet.      Lifetime Cancer Risks    General Population HDGC   Diffuse Gastric  <1% ~80%   Breast 12% 41-60%       Additional Genes    ZAK  ZAK is a moderate-risk breast cancer gene. Women who have a mutation in ZAK can have between a 2-4 fold increased risk for breast cancer compared to the general population8. ZAK mutations have also been associated with increased risk for pancreatic cancer between 5-10%9. Individuals who inherit two ZAK mutations have a condition called ataxia-telangiectasia (AT).  This rare autosomal recessive condition affects the nervous system  and immune system, and is associated with progressive cerebellar ataxia beginning in childhood. Individuals with ataxia-telangiectasia often have a weakened immune system and have an increased risk for childhood cancers.    PALB2  Mutations in PALB2 have been shown to increase the risk of breast cancer up to 41-60% in some families; where individuals fall within this risk range is dependent upon family [id number]. PALB2 mutations have also been associated with increased risk for pancreatic cancer between 5-10%.  Individuals who inherit two PALB2 mutations--one from their mother and one from their father--have a condition called Fanconi Anemia.  This rare autosomal recessive condition is associated with short stature, developmental delay, bone marrow failure, and increased risk for childhood cancers.    CHEK2   CHEK2 is a moderate-risk breast cancer gene.  Women who have a mutation in CHEK2 have around a 2-4 fold increased risk for breast cancer compared to the general population, and this risk may be higher depending upon family history.11,12,13 The risk of colon cancer may be twice as high as the general population risk of colon cancer of 5%. Mutations in CHEK2 have also been shown to increase the risk of other cancers, including prostate, however these cancer risks are currently not well understood.    BRIP1, RAD51C and RAD51D  Mutations in RAD51C and RAD51D have been shown to increase the risk of ovarian cancer and breast cancer 14,. Mutations in BRIP1 have been shown to increase the risk of ovarian cancer and possibly female breast cancer 15 .       Lifetime Cancer Risk    General Population        BRIP1   RAD51C  RAD51D   Breast 12% Not well defined 20-40% 20-40%   Ovarian 1-2% 5-15% 10-15% 10-20%     ______________________________________________________________  Inheritance  All of the cancer syndromes reviewed above are inherited in an autosomal dominant pattern.  This means that if a parent has a mutation,  each of their children will have a 50% chance of inheriting that same mutation. Therefore, each child --male or female-- would have a 50% chance of being at increased risk for developing cancer.    Image obtained from Genetics Home Reference, 2013     Mutations in some genes can occur de danelle, which means that a person s mutation occurred for the first time in them and was not inherited from a parent.  Now that they have the mutation, however, it can be passed on to future generations.    Genetic Testing  Genetic testing involves a blood test and will look for any harmful mutations that are associated with increased cancer risk.  If possible, it is recommended that the person(s) who has had cancer be tested before other family members.  That person will give us the most useful information about whether or not a specific gene is associated with the cancer in the family.    Results  There are three possible results of genetic testing:  Positive--a harmful mutation was identified in one or more of the genes  Negative--no mutations were identified in any of the genes tested  Variant of unknown significance--a variation in one of the genes was identified, but it is unclear how this impacts cancer risk in the family    Advantages and Disadvantages   There are advantages and disadvantages to genetic testing.    Advantages  May clarify your cancer risk  Can help you make medical decisions  May explain the cancers in your family  May give useful information to your family members (if you share your results)    Disadvantages  Possible negative emotional impact of learning about inherited cancer risk  Uncertainty in interpreting a negative test result in some situations  Possible genetic discrimination concerns (see below)    Genetic Information Nondiscrimination Act (SALINAS)  The Genetic Information Nondiscrimination Act of 2008 (SALINAS) is a federal law that protects individuals from health insurance or employment discrimination  based on a genetic test result alone (with some exceptions, including employers with fewer than 15 employees, and ).  Although rare, SALINAS  does not cover discrimination protections in terms of life insurance, long term care, or disability insurances.  Visit the National Human [employer] Research [place] website to learn more.    Reducing Cancer Risk  All of the genes described in this handout have nationally recognized cancer screening guidelines that would be recommended for individuals who test positive.  In addition to increased cancer screening, surgeries may be offered or recommended to reduce cancer risk.  Recommendations are based upon an individual s genetic test result as well as their personal and family history of cancer.    Questions to Think About Regarding Genetic Testing:  What effect will the test result have on me and my relationship with my family members if I have an inherited gene mutation?  If I don t have a gene mutation?  Should I share my test results, and how will my family react to this news, which may also affect them?  Are my children ready to learn new information that may one day affect their own health?    Hereditary Cancer Resources    FORCE: Facing Our Risk of Cancer Empowered facingourrisk.org   Bright Pink bebrightpink.org   Li-Fraumeni Syndrome Association lfsassociation.org   PTEN World PTENworld.com   No stomach for cancer, Inc. nostomachforcancer.org   Stomach cancer relief network Scrnet.org   Collaborative Group of the Americas on Inherited Colorectal Cancer (CGA) cgaicc.com    Cancer Care cancercare.org   American Cancer Society (ACS) cancer.org   National Cancer [place] (NCI) cancer.gov     Please call us if you have any questions or concerns.   Cancer Risk Management Program [phone] ([phone])  Joseph Oscar, [place] [facility] [place]  [phone]  Alyssia García, MS, [facility] [place] [phone]  Justina Davis, MS, [facility] [place]  [phone]  Gina Zhang, MS, [facility] [place]  [phone]  Carolyn Villegas,  MS, [facility] [place]  [phone]  Shauna Wiley, MS, [facility] [place] [phone]  Carie Armendariz, MS, [facility] [place] [phone]    References  Gustavo Regalado PDP, Lisha S, Sharad HASSAN, Guy JE, Mercedes JL, Rhianna N, Suzanna H, Rashid O, Keyon A, Pasini B, Radiledy P, Manmulu S, Jeromy DM, Rodriguez N, Dianelys E, Amy H, Sargent E, Melissa J, Gronkatherine J, Ann B, Tulinius H, Thorlacius S, Eerola H, Nevanlinna H, Robert K, Smooth OP. Average risks of breast and ovarian cancer associated with BRCA1 or BRCA2 mutations detected in case series unselected for family history: a combined analysis of 222 studies. Am J Hum Lida. 2003;72:1117-30.  Kunal N, Alexandra M, Tiffany G.  BRCA1 and BRCA2 Hereditary Breast and Ovarian Cancer. Gene Reviews online. 2013.  Diego YC, Claudia S, Leonard G, Pressley S. Breast cancer risk among male BRCA1 and BRCA2 mutation carriers. J Natl Cancer Inst. 2007;99:1811-4.  Ishan CARMONA, Abelardo I, Dell J, Chucky E, Hilton ER, Ramos F. Risk of breast cancer in male BRCA2 carriers. J Med Lida. 2010;47:710-1.  National Comprehensive Cancer Network. Clinical practice guidelines in oncology, colorectal cancer screening. Available online (registration required). 2015.  Roge MH, Erika J, Nabil J, Dot OLVERA, Ambika MS, Eng C. Lifetime cancer risks in individuals with germline PTEN mutations. Clin Cancer Res. 2012;18:400-7.  Sheila R. Cowden Syndrome: A Critical Review of the Clinical Literature. J Lida . 2009:18:13-27.  Nesha MARION, Elian PARK, Maciel S, Sarah P, Bao T, Jessica M, Paulino B, Flavia H, Hiral R, Beau K, Airam L, Ishan CARMONA, Jeromy PARK, Sukhdeep DF, Kamryn MR, The Breast Cancer Susceptibility Collaboration ([place]) & Bobby MANZANO. ZAK mutations that cause ataxia-telangiectasia are breast cancer susceptibility alleles. Nature Genetics. 2006;38:873-875  Devin N , Lani Y, Kelley J, Robert L, Rafaela ORTIZ , Anya ML, Oneida S, Lisa AG, Taco S, Perlita ML, Parvin J , Americo R, Katie PERRY, Alcira  JR, Hilda VE, Carole M, Voruelstein B, Dhiraj N, Deja RH, Kenton KW, and Aga AP. ZAK mutations in patients with hereditary pancreatic cancer. Cancer Discover. 2012;2:41-46  Radha JACK., et al. Breast-Cancer Risk in Families with Mutations in PALB2. NEJM. 2014; 371(6):497-506.  CHEK2 Breast Cancer Case-Control Consortium. CHEK2*1100delC and susceptibility to breast cancer: A collaborative analysis involving 10,860 breast cancer cases and 9,065 controls from 10 studies. Am J Hum Lida, 74 (2004), pp. 6970-5106  Siria T, Artis S, Celso K, et al. Spectrum of Mutations in BRCA1, BRCA2, CHEK2, and TP53 in Families at High Risk of Breast Cancer. DANA. 2006;295(12):3140-7544.   Cassandra C, Ena D, Tono MARION, et al. Risk of breast cancer in women with a CHEK2 mutation with and without a family history of breast cancer. J Clin Oncol. 2011;29:4670-5462.  Song H, Balbirs E, Ramus SJ, et al. Contribution of germline mutations in the RAD51B, RAD51C, and RAD51D genes to ovarian cancer in the population. J Clin Oncol. 2015;33(26):1198-3796. Doi:10.1200/JCO.2015.61.2408.  Yakelin T, Negrita DF, Ranjan P, et al. Mutations in BRIP1 confer high risk of ovarian cancer. Fernanda Lida. 2011;43(11):5843-8470. doi:10.1038/ng.955.

## 2024-03-20 NOTE — Clinical Note
"    3/20/2024         RE: Dae Castañeda  [address] [facility] [address]        Dear Colleague,    Thank you for referring your patient, Dae Castañeda, to the [facility]. Please see a copy of my visit note below.    Virtual Visit Details    Type of service:  Video Visit   Video Start Time: {video visit start/end time for provider to select:204577}  Video End Time:{video visit start/end time for provider to select:463649}    Originating Location (pt. Location): {video visit patient location:937158::\"Home\"}  {PROVIDER LOCATION On-site should be selected for visits conducted from your clinic location or adjoining [facility] hospital, academic office, or other nearby [facility] building. Off-site should be selected for all other provider locations, including home:822074}  Distant Location (provider location):  {virtual location provider:012656}  Platform used for Video Visit: {Virtual Visit Platforms:002698::\"[employer]\"}    3/20/2024    Referring Provider: Jazmin Rosales MD    Presenting Information:   I met with Dae for her video genetic counseling visit, through the Cancer Risk Management Program, to discuss her family history of breast and colon cancer. Today we reviewed this history, cancer screening recommendations, and available genetic testing options.    Personal History:  Dae is a 48 year old year old female. She does not have any personal history of cancer. She had her first menstrual period at age 16, her first child at age 29, and is ***.  ***Dae has her ovaries, fallopian tubes and uterus removed at age 46 due to a ruptured ovarian cyst. She reports that she *** hormone replacement therapy.      Her most recent mammogram in August 2023 was normal, with a few cysts. Dae began having colonoscopies at the age of 46. Her most recent colonoscopy in August 2021 was normal and follow-up was recommended in 10 years. She does not regularly do any other cancer " screening at this time.     Family History: (Please see scanned pedigree for detailed family history information)  Dae's mother was diagnosed with breast cancer at age 59.  Dae's maternal grandmother was diagnosed with breast cancer at age 60 and  at age ***.  Dae's paternal grandfather  from colon cancer at age 55.  There is no other reported family history of cancer on either side of the family. However, Dae's mother had 4 brothers, which can limit the amount of breast cancer that we see in a family with numerous males.  Her maternal ethnicity is Polish and Slovak. Her paternal ethnicity is Bhutanese. ***There is no known Ashkenazi Islam ancestry on either side of her family. There is no reported consanguinity.    Discussion:  Dae's family history of breast and colon cancer is suggestive of a hereditary cancer syndrome.  We reviewed the features of sporadic, familial, and hereditary cancers. We discussed that mutations in either BRCA1 or BRCA2 could be possible hereditary explanations for her family history of cancer. Mutations in the BRCA1 or BRCA2 gene are known to cause Hereditary Breast and Ovarian Cancer Syndrome (HBOC). HBOC typically presents with multiple family members diagnosed with breast cancer before age 50 and/or ovarian cancer. Other cancer risks associated with HBOC include male breast cancer, prostate cancer, pancreatic cancer, and melanoma.  We discussed the natural history and genetics of hereditary cancer. A detailed handout regarding hereditary cancer, along with the other information we discussed, will be mailed to Dae at the end of our appointment today and can be found in the after visit summary. Topics included: inheritance pattern, cancer risks, cancer screening recommendations, and also risks, benefits and limitations of testing.  Based on her personal and family history, Dae meets current National Comprehensive Cancer Network (NCCN) criteria for genetic testing of  ***.  We discussed that there are additional genes that could cause increased risk for breast and/or colon cancer. As many of these genes present with overlapping features in a family and accurate cancer risk cannot always be established based upon the pedigree analysis alone, it would be reasonable for Dae to consider panel genetic testing to analyze multiple genes at once.  We discussed that we ideally prefer to test someone affected with cancer first as they are the most informative to test. We also discussed that Dae would not meet criteria for genetic testing based on her current family history, but there are guidelines that state that anyone with a personal history of breast cancer should consider genetic testing, which would apply to her mother. ***  ***Dae stated that she would prefer to submit a saliva kit for her genetic testing. [facility] will send a kit directly to her home with directions on how to collect a saliva sample. We discussed that there is a small chance for sample failure due to contamination of the sample. To help minimize this, she should follow the directions that are sent with the kit. Dea verbalized understanding of this. Once the sample is collected, she will send it to [facility] using the return envelope and prepaid shipping label.   Verbal consent was given over video and written on the consent form. Turnaround time is approximately 4 weeks once the lab receives the sample.  Should Dae have any questions regarding the billing for her genetic testing, she should visit [facility]'s billing website at [email or web address] or call them at [phone].  Medical Management: For Dae, we reviewed that the information from genetic testing may determine:  additional cancer screening for which Dae may qualify (i.e. mammogram and breast MRI, more frequent colonoscopies, more frequent dermatologic exams, etc.),  options for risk reducing surgeries Dae could  consider (i.e. bilateral mastectomy, surgery to remove her ovaries and/or uterus, etc.),    and targeted chemotherapies if she were to develop certain cancers in the future (i.e. immunotherapy for individuals with Del Cid syndrome, PARP inhibitors, etc.).   These recommendations and possible targeted chemotherapies will be discussed in detail once genetic testing is completed.     Plan:  1) Today Dae elected to proceed with *** testing with automatic reflex to ***.  2) A copy of the consent form and the after visit summary will be sent to [facility].  3) This information should be available in approximately 4 weeks, once the lab receives the sample.  4) I will call [facility] with the results once they become available.    Time spent on video: *** minutes    Joseph Oscar MS, [facility] [place]  Licensed, Certified Genetic Counselor    ***    3/20/2024    Referring Provider: Jazmin Rosales MD    Presenting Information:   I met with Dae for her video genetic counseling visit, through the Cancer Risk Management Program, to discuss her family history of breast and colon cancer. Today we reviewed this history, cancer screening recommendations, and available genetic testing options.    Personal History:  Dae is a 48 year old year old female. She does not have any personal history of cancer. She had her first menstrual period at age 16, her first child at age 29, and is ***.  ***Dae has her ovaries, fallopian tubes and uterus removed at age 46 due to a ruptured ovarian cyst. She reports that she *** hormone replacement therapy.      Her most recent mammogram in August 2023 was normal, with a few cysts. Dae began having colonoscopies at the age of 46. Her most recent colonoscopy in August 2021 was normal and follow-up was recommended in 10 years. She does not regularly do any other cancer screening at this time.     Family History: (Please see scanned pedigree for detailed family history information)  Dae's mother was diagnosed with  breast cancer at age 59.  Dae's maternal grandmother was diagnosed with breast cancer at age 60 and  at age ***.  Dae's paternal grandfather  from colon cancer at age 55.  There is no other reported family history of cancer on either side of the family. However, Dae's mother had 4 brothers, which can limit the amount of breast cancer that we see in a family with numerous males.  Her maternal ethnicity is Polish and Divehi. Her paternal ethnicity is Japanese. ***There is no known Ashkenazi Holiness ancestry on either side of her family. There is no reported consanguinity.    Discussion:  Dae's family history of breast and colon cancer is suggestive of a hereditary cancer syndrome.  We reviewed the features of sporadic, familial, and hereditary cancers. We discussed that mutations in either BRCA1 or BRCA2 could be possible hereditary explanations for her family history of cancer. Mutations in the BRCA1 or BRCA2 gene are known to cause Hereditary Breast and Ovarian Cancer Syndrome (HBOC). HBOC typically presents with multiple family members diagnosed with breast cancer before age 50 and/or ovarian cancer. Other cancer risks associated with HBOC include male breast cancer, prostate cancer, pancreatic cancer, and melanoma.  We discussed the natural history and genetics of hereditary cancer. A detailed handout regarding hereditary cancer, along with the other information we discussed, will be mailed to Dae at the end of our appointment today and can be found in the after visit summary. Topics included: inheritance pattern, cancer risks, cancer screening recommendations, and also risks, benefits and limitations of testing.  Based on her personal and family history, Dae meets current National Comprehensive Cancer Network (NCCN) criteria for genetic testing of ***.  We discussed that there are additional genes that could cause increased risk for breast and/or colon cancer. As many of these genes present with  overlapping features in a family and accurate cancer risk cannot always be established based upon the pedigree analysis alone, it would be reasonable for Dae to consider panel genetic testing to analyze multiple genes at once.  We discussed that we ideally prefer to test someone affected with cancer first as they are the most informative to test. We also discussed that Dae would not meet criteria for genetic testing based on her current family history, but there are guidelines that state that anyone with a personal history of breast cancer should consider genetic testing, which would apply to her mother. ***  ***Dae stated that she would prefer to submit a saliva kit for her genetic testing. [facility] to her home with directions on how to collect a saliva sample. We discussed that there is a small chance for sample failure due to contamination of the sample. To help minimize this, she should follow the directions that are sent with the kit. Dae verbalized understanding of this. Once the sample is collected, she will send it to [facility] using the return envelope and prepaid shipping label.   Verbal consent was given over video and written on the consent form. Turnaround time is approximately 4 weeks once the lab receives the sample.  Should Dae have any questions regarding the billing for her genetic testing, she should visit [facility]'s billing website at [email or web address] or call them at [phone].  Medical Management: For Dae, we reviewed that the information from genetic testing may determine:  additional cancer screening for which Dae may qualify (i.e. mammogram and breast MRI, more frequent colonoscopies, more frequent dermatologic exams, etc.),  options for risk reducing surgeries Dae could consider (i.e. bilateral mastectomy, surgery to remove her ovaries and/or uterus, etc.),    and targeted chemotherapies if she were to develop certain  cancers in the future (i.e. immunotherapy for individuals with Del Cid syndrome, PARP inhibitors, etc.).   These recommendations and possible targeted chemotherapies will be discussed in detail once genetic testing is completed.     Plan:  1) Today [facility] elected to proceed with *** testing with automatic reflex to ***.  2) A copy of the consent form and the after visit summary will be sent to [facility].  3) This information should be available in approximately 4 weeks, once the lab receives the sample.  4) I will call [facility] with the results once they become available.    Time spent on video: *** minutes    Joseph Oscar MS, [facility] [place]  Licensed, Certified Genetic Counselor    ***  Virtual Visit Details    Type of service:  Video Visit     Originating Location (pt. Location): Home  Distant Location (provider location):  Off-site  Platform used for Video Visit: Precious      Again, thank you for allowing me to participate in the care of your patient.        Sincerely,        Joseph Oscar GC

## 2024-03-20 NOTE — NURSING NOTE
Is the patient currently in the state of [place]? YES    Visit mode:VIDEO    If the visit is dropped, the patient can be reconnected by: VIDEO VISIT: Send to e-mail at: [email or web address]    Will anyone else be joining the visit? NO  (If patient encounters technical issues they should call [phone]    How would you like to obtain your AVS? MyChart    Are changes needed to the allergy or medication list? N/A    Reason for visit: Consult      Sofia PRADHAN

## 2024-03-20 NOTE — PROGRESS NOTES
3/20/2024    Referring Provider: Jazmin Rosales MD    Presenting Information:   I met with Dae for her video genetic counseling visit, through the Cancer Risk Management Program, to discuss her family history of breast and colon cancer. Today we reviewed this history, cancer screening recommendations, and available genetic testing options.    Personal History:  Dae is a 48 year old year old female. She does not have any personal history of cancer. She had her first menstrual period at age 16, her first child at age 29, and completed menopause at age 46. Dae had her left ovary, fallopian tubes and uterus removed at age 46 due to a pelvic congestion syndrome. She reports that she just started taking hormone replacement therapy.      Her most recent mammogram in 2023 was normal, with a few cysts. Dae began having colonoscopies at the age of 46. Her most recent colonoscopy in 2021 was normal and follow-up was recommended in 10 years. She does not regularly do any other cancer screening at this time.     Family History: (Please see scanned pedigree for detailed family history information)  Dae's mother was diagnosed with breast cancer at age 59.  Dae's maternal grandmother was diagnosed with breast cancer at age 60 and  at an unknown age.  Dae's paternal grandfather  from colon cancer at age 55.  There is no other reported family history of cancer on either side of the family. However, Dae's mother had 4 brothers, which can limit the amount of breast cancer that we see in a family with numerous males.  Her maternal ethnicity is Polish and Iranian. Her paternal ethnicity is Norwegian. Dae's maternal grandfather is 100% Ashkenazi Buddhist. There is no reported consanguinity.    Discussion:  Dae's family history of breast and colon cancer is suggestive of a hereditary cancer syndrome.  We reviewed the features of sporadic, familial, and hereditary cancers. We discussed that mutations  in either BRCA1 or BRCA2 could be possible hereditary explanations for her family history of cancer. Mutations in the BRCA1 or BRCA2 gene are known to cause Hereditary Breast and Ovarian Cancer Syndrome (HBOC). HBOC typically presents with multiple family members diagnosed with breast cancer before age 50 and/or ovarian cancer. Other cancer risks associated with HBOC include male breast cancer, prostate cancer, pancreatic cancer, and melanoma.  We discussed the natural history and genetics of hereditary cancer. A detailed handout regarding hereditary cancer, along with the other information we discussed, will be mailed to Dae at the end of our appointment today and can be found in the after visit summary. Topics included: inheritance pattern, cancer risks, cancer screening recommendations, and also risks, benefits and limitations of testing.  Based on her personal and family history, Dae meets current National Comprehensive Cancer Network (NCCN) criteria for genetic testing of BRCA1/2 along with other high-penetrance breast cancer susceptibility genes (I.e. CDH1, PALB2, PTEN, and TP53). NCCN states that anyone with a grandparent that is 100% Ashkenazi Restorationism, along with a family history of breast cancer, should have genetic testing given the increased likelihood of having a hereditary cancer syndrome in this population.  We discussed that there are additional genes that could cause increased risk for breast and/or colon cancer. As many of these genes present with overlapping features in a family and accurate cancer risk cannot always be established based upon the pedigree analysis alone, it would be reasonable for Dae to consider panel genetic testing to analyze multiple genes at once.  We discussed that we ideally prefer to test someone affected with cancer first as they are the most informative to test. However, Dae stated that genetic testing has been discussed with her mother but she is not interested in  pursuing it. As such, Dae should pursue genetic testing to better understand her risk for hereditary cancer.  Genetic testing is available for BRCA1/2 as part of the patient's core panel. This will then be automatically reflexed to [facility]'s CancerNext panel.  Genetic testing is available for 34 genes associated with hereditary cancer: CancerNext (APC, ZAK, AXIN2, BARD1, BMPR1A, BRCA1, BRCA2, BRIP1, CDH1, CDK4, CDKN2A, CHEK2, DICER1, EPCAM, GREM1, HOXB13, MLH1, MSH2, MSH3, MSH6, MUTYH, NF1, NTHL1, PALB2, PMS2, POLD1, POLE, PTEN, RAD51C, RAD51D SMAD4, SMARCA4, STK11, and TP53).  We discussed that many of the genes in the CancerNext panel are associated with specific hereditary cancer syndromes and published management guidelines: Hereditary Breast and Ovarian Cancer syndrome (BRCA1, BRCA2), Del Cid syndrome (MLH1, MSH2, MSH6, PMS2, EPCAM), Familial Adenomatous Polyposis (APC), Hereditary Diffuse Gastric Cancer (CDH1), Familial Atypical Multiple Mole Melanoma syndrome (CDK4, CDKN2A), Juvenile Polyposis syndrome (BMPR1A, SMAD4), Cowden syndrome (PTEN), Li Fraumeni syndrome (TP53), Peutz-Jeghers syndrome (STK11), MUTYH Associated Polyposis (MUTYH), and Neurofibromatosis type 1 (NF1).   The ZAK, AXIN2, BRIP1, CHEK2, GREM1, MSH3, NTHL1, PALB2, POLD1, POLE, RAD51C, and RAD51D genes are associated with increased cancer risk and have published management guidelines for certain cancers.    The remaining genes (BARD1, DICER1, HOXB13, and SMARCA4) are associated with increased cancer risk and may allow us to make medical recommendations when mutations are identified.    Dae stated that she would prefer to submit a saliva kit for her genetic testing. [facility] will send a kit directly to her home with directions on how to collect a saliva sample. We discussed that there is a small chance for sample failure due to contamination of the sample. To help minimize this, she should follow the directions that are sent with the kit. Dae  verbalized understanding of this. Once the sample is collected, she will send it to [facility] using the return envelope and prepaid shipping label.   Verbal consent was given over video and written on the consent form. Turnaround time is approximately 4 weeks once the lab receives the sample.  Should Dae have any questions regarding the billing for her genetic testing, she should visit [facility]'s billing website at [email or web address] or call them at [phone].  Medical Management: For Dae, we reviewed that the information from genetic testing may determine:  additional cancer screening for which Dae may qualify (i.e. mammogram and breast MRI, more frequent colonoscopies, more frequent dermatologic exams, etc.),  options for risk reducing surgeries Dae could consider (i.e. bilateral mastectomy, surgery to remove her ovaries and/or uterus, etc.),    and targeted chemotherapies if she were to develop certain cancers in the future (i.e. immunotherapy for individuals with Del Cid syndrome, PARP inhibitors, etc.).   These recommendations and possible targeted chemotherapies will be discussed in detail once genetic testing is completed.     Plan:  1) Today Dae elected to proceed with BRCA1/2 testing with automatic reflex to [facility]'s CancerNext panel.  2) A copy of the consent form and the after visit summary will be sent to [facility].  3) This information should be available in approximately 4 weeks, once the lab receives the sample.  4) I will call Dae with the results once they become available.    Time spent on video: 35 minutes    Joseph Oscar MS, [facility] [place]  Licensed, Certified Genetic Counselor      Virtual Visit Details    Type of service:  Video Visit     Originating Location (pt. Location): Home  Distant Location (provider location):  Off-site  Platform used for Video Visit: Precious

## 2024-04-01 ENCOUNTER — TELEPHONE (OUTPATIENT)
Dept: SURGERY | Facility: CLINIC | Age: 49
End: 2024-04-01
Payer: COMMERCIAL

## 2024-04-01 DIAGNOSIS — Z91.89 AT HIGH RISK FOR BREAST CANCER: Primary | ICD-10-CM

## 2024-04-01 NOTE — TELEPHONE ENCOUNTER
Name of caller: Patient    Reason for Call:  Needs an order for her 6 month breast imaging to be placed so she can schedule. Should be the samson this time?    Please call patient to let her know the order has been placed.    Surgeon:  Jazmin Rosales MD    Recent Surgery:  No    If yes, when & what type:  N/A      Best phone number to reach pt at is: [phone]    Ok to leave a message with medical info? Yes.

## 2024-04-02 NOTE — TELEPHONE ENCOUNTER
Dae last saw Dr. Rosales on 11/3/24 for increased lifetime risk of breast cancer. She also had a new indentation on her right breast areola. Her last imaging was a bilateral breast MRI on 10/6/23 which found no concerning areas. At her clinic visit, Dr. Rosales was recommending a 3 month follow up (February 2024).     Her next imaging that would be due is a bilateral screening mammogram (May 2024- 6 months after MRI). Her last mammogram was a bilateral diagnostic mammo on 8/23/23.     Will route to Dr. Rosales to advise.     Sonali Pollard, RN, BSN, PHN  Breast Care Nurse Coordinator  [facility] [place]  [phone]

## 2024-04-03 NOTE — TELEPHONE ENCOUNTER
"I called Dae to discuss Dr. Rosales's recommendation. Dr. Rosales is recommending a bilateral screening mammogram now and then a breast MRI in 6 months. Dae plans on scheduling a clinic appointment with Dr. Rosales after her mammogram for her \"3 month\" check up.     As she is unable to write now, I will send her a [employer] message with the phone number to schedule the screening mammogram. Once she scheduled the mammo, she will call our main clinic number to schedule the in person visit.     Pt agreeable to the plan and verbalized understanding.     Sonali Pollard, RN, BSN, PHN  Breast Care Nurse Coordinator  [facility]- [facility] [place]  [phone]    "

## 2024-04-10 ENCOUNTER — HOSPITAL ENCOUNTER (OUTPATIENT)
Dept: MAMMOGRAPHY | Facility: CLINIC | Age: 49
Discharge: HOME | End: 2024-04-10
Attending: SURGERY | Admitting: SURGERY
Payer: COMMERCIAL

## 2024-04-10 DIAGNOSIS — Z91.89 AT HIGH RISK FOR BREAST CANCER: ICD-10-CM

## 2024-04-10 PROCEDURE — 77063 BREAST TOMOSYNTHESIS BI: CPT

## 2024-04-16 DIAGNOSIS — Z80.3 FAMILY HISTORY OF MALIGNANT NEOPLASM OF BREAST: Primary | ICD-10-CM

## 2024-04-16 DIAGNOSIS — Z80.0 FAMILY HISTORY OF MALIGNANT NEOPLASM OF COLON: ICD-10-CM

## 2024-04-18 ENCOUNTER — VIRTUAL VISIT (OUTPATIENT)
Dept: ONCOLOGY | Facility: CLINIC | Age: 49
End: 2024-04-18
Attending: GENETIC COUNSELOR, MS
Payer: COMMERCIAL

## 2024-04-18 DIAGNOSIS — Z80.3 FAMILY HISTORY OF MALIGNANT NEOPLASM OF BREAST: Primary | ICD-10-CM

## 2024-04-18 DIAGNOSIS — Z80.0 FAMILY HISTORY OF MALIGNANT NEOPLASM OF COLON: ICD-10-CM

## 2024-04-18 PROCEDURE — 999N000069 HC STATISTIC GENETIC COUNSELING, < 16 MIN: Mod: GT | Performed by: GENETIC COUNSELOR, MS

## 2024-04-18 NOTE — NURSING NOTE
Is the patient currently in the state of [place]? YES    Visit mode:VIDEO    If the visit is dropped, the patient can be reconnected by: VIDEO VISIT: Text to cell phone:   Telephone Information:   Mobile [phone]       Will anyone else be joining the visit? NO  (If patient encounters technical issues they should call [phone])    How would you like to obtain your AVS? MyChart    Are changes needed to the allergy or medication list? No    Are refills needed on medications prescribed by this physician? NO    Reason for visit: DONNA PARDHAN

## 2024-04-18 NOTE — LETTER
"April 18, 2024    Dae Castañeda  [address] [facility] [address]      Dear Dae,    It was a pleasure speaking with you over video on 4/18/2024. Here is a copy of the progress note from our discussion. If you have any additional questions, please feel free to call.    Referring Provider: Jazmin Rosales MD    Presenting Information:  I spoke to Dae by video today to discuss her genetic testing results. A saliva kit was sent to her house on 3/20/24. The CancerNext panel was ordered from [facility]. This testing was done because of Dae's family history of breast and colon.    Genetic Testing Result: NEGATIVE  Dae is negative for mutations in the APC, ZAK, AXIN2, BARD1, BMPR1A, BRCA1, BRCA2, BRIP1, CDH1, CDK4, CDKN2A, CHEK2, DICER1, EPCAM, GREM1, HOXB13, MLH1, MSH2, MSH3, MSH6, MUTYH, NF1, NTHL1, PALB2, PMS2, POLD1, POLE, PTEN, RAD51C, RAD51D SMAD4, SMARCA4, STK11, and TP53. No mutations were found in any of the 34 genes analyzed. This test involved sequencing and deletion/duplication analysis of all genes with the exceptions of EPCAM and GREM1 (deletions/duplications only) and HOXB13, POLD1 and POLE (sequencing only).    A copy of the test report can be found in the Laboratory tab, dated 3/28/24, and named \"LABORATORY MISCELLANEOUS ORDER\". The report is scanned in as a linked document.    Interpretation:  We discussed several different interpretations of this negative test result.    One explanation may be that there is a different gene or combination of genes and environment that are associated with the cancers in this family.  It is possible that her mother did have a mutation in one of these genes and she did not inherit it.  There is also a small possibility that there is a mutation in one of these genes, and the testing laboratory could not find it with their current testing methods.             Screening:  Based on this negative test result, it is important for Dae and her relatives to " refer back to the family history for appropriate cancer screening.    Based on her personal and family history, Dae has a 23.8% lifetime risk of developing breast cancer based on the LORENZO model. As such, Dae meets current National Comprehensive Cancer Network (NCCN) guidelines for high risk breast screening. This includes annual breast MRI in addition to annual mammogram beginning at age 40. In addition, Dae should be receiving clinical breast exams by her physician. We discussed that Dae could participate in our Cancer Risk Management Program in which our nursing specialist provides an individual screening plan and assists with medical management. Dae was not interested in this referral as she reports already receiving increased breast screening with another provider.  Other population cancer screening options, such as those recommended by the American Cancer Society and the National Comprehensive Cancer Network (NCCN), are also appropriate for Dae and her family. These screening recommendations may change if there are changes to Dae's personal and/or family history of cancer. Final screening recommendations should be made by each individual's primary care provider.      Inheritance:  We reviewed autosomal dominant inheritance. We discussed that Dae did not pass on an identifiable mutation in these genes to her daughter based on this test result. Mutations in these genes do not skip generations.      Additional Testing Considerations:  Although Dae's genetic testing result was negative, other relatives may still carry a gene mutation associated with breast and/or colon cancer. Genetic counseling is recommended for her mother to discuss genetic testing options. If she, or any other relatives do pursue genetic testing, Dae is encouraged to contact me so that we may review the impact of their test results on her.    Summary:  We do not have an explanation for Dae's family history of cancer. While  no genetic changes were identified, Dae may still be at risk for certain cancers due to family history, environmental factors, or other genetic causes not identified by this test. Because of that, it is important that she continue with cancer screening based on her personal and family history as discussed above.    Genetic testing is rapidly advancing, and new cancer susceptibility genes will most likely be identified in the future. Therefore, I encouraged Dae to contact me annually or if there are changes in her personal or family history. This may change how we assess her cancer risk, screening, and the testing we would offer.    Plan:  1.  A copy of the test results will be sent to Dae.  2. She plans to follow-up with her other providers.  3. She should contact me regularly, or sooner if her family history changes.    If Dae has any further questions, I encouraged her to contact me via Habbits.    Time spent on video: 5 minutes.    Joseph Oscar MS, [facility] [place]  Licensed, Certified Genetic Counselor

## 2024-04-18 NOTE — Clinical Note
"    4/18/2024         RE: Dae Castañeda  [address] [facility] [address] [facility] [address]        Dear Colleague,    Thank you for referring your patient, Dae Castañeda, to the [facility]. Please see a copy of my visit note below.    Virtual Visit Details    Type of service:  Video Visit   Video Start Time: {video visit start/end time for provider to select:642555}  Video End Time:{video visit start/end time for provider to select:647078}    Originating Location (pt. Location): {video visit patient location:914613::\"Home\"}  {PROVIDER LOCATION On-site should be selected for visits conducted from your clinic location or adjoining [facility] hospital, academic office, or other nearby [facility] building. Off-site should be selected for all other provider locations, including home:669949}  Distant Location (provider location):  {virtual location provider:860402}  Platform used for Video Visit: {Virtual Visit Platforms:151998::\"[employer]\"}      Again, thank you for allowing me to participate in the care of your patient.        Sincerely,        Joseph Oscar GC  "

## 2024-04-18 NOTE — PROGRESS NOTES
"4/18/2024    Referring Provider: Jazmin Rosales MD    Presenting Information:  I spoke to Dae by video today to discuss her genetic testing results. A saliva kit was sent to her house on 3/20/24. The CancerNext panel was ordered from [facility]. This testing was done because of Dae's family history of breast and colon.    Genetic Testing Result: NEGATIVE  Dae is negative for mutations in the APC, ZAK, AXIN2, BARD1, BMPR1A, BRCA1, BRCA2, BRIP1, CDH1, CDK4, CDKN2A, CHEK2, DICER1, EPCAM, GREM1, HOXB13, MLH1, MSH2, MSH3, MSH6, MUTYH, NF1, NTHL1, PALB2, PMS2, POLD1, POLE, PTEN, RAD51C, RAD51D SMAD4, SMARCA4, STK11, and TP53. No mutations were found in any of the 34 genes analyzed. This test involved sequencing and deletion/duplication analysis of all genes with the exceptions of EPCAM and GREM1 (deletions/duplications only) and HOXB13, POLD1 and POLE (sequencing only).    A copy of the test report can be found in the Laboratory tab, dated 3/28/24, and named \"LABORATORY MISCELLANEOUS ORDER\". The report is scanned in as a linked document.    Interpretation:  We discussed several different interpretations of this negative test result.    One explanation may be that there is a different gene or combination of genes and environment that are associated with the cancers in this family.  It is possible that her mother did have a mutation in one of these genes and she did not inherit it.  There is also a small possibility that there is a mutation in one of these genes, and the testing laboratory could not find it with their current testing methods.       Screening:  Based on this negative test result, it is important for Dae and her relatives to refer back to the family history for appropriate cancer screening.    Based on her personal and family history, Dae has a 23.8% lifetime risk of developing breast cancer based on the LORENZO model. As such, Dae meets current National Comprehensive Cancer Network (NCCN) guidelines " for high risk breast screening. This includes annual breast MRI in addition to annual mammogram beginning at age 40. In addition, Dae should be receiving clinical breast exams by her physician. We discussed that Dae could participate in our Cancer Risk Management Program in which our nursing specialist provides an individual screening plan and assists with medical management. Dae was not interested in this referral as she reports already receiving increased breast screening with another provider.  Other population cancer screening options, such as those recommended by the American Cancer Society and the National Comprehensive Cancer Network (NCCN), are also appropriate for Dae and her family. These screening recommendations may change if there are changes to Dae's personal and/or family history of cancer. Final screening recommendations should be made by each individual's primary care provider.      Inheritance:  We reviewed autosomal dominant inheritance. We discussed that Dae did not pass on an identifiable mutation in these genes to her daughter based on this test result. Mutations in these genes do not skip generations.      Additional Testing Considerations:  Although Dae's genetic testing result was negative, other relatives may still carry a gene mutation associated with breast and/or colon cancer. Genetic counseling is recommended for her mother to discuss genetic testing options. If she, or any other relatives do pursue genetic testing, Dae is encouraged to contact me so that we may review the impact of their test results on her.    Summary:  We do not have an explanation for Dae's family history of cancer. While no genetic changes were identified, Dea may still be at risk for certain cancers due to family history, environmental factors, or other genetic causes not identified by this test. Because of that, it is important that she continue with cancer screening based on her personal and  family history as discussed above.    Genetic testing is rapidly advancing, and new cancer susceptibility genes will most likely be identified in the future. Therefore, I encouraged Dae to contact me annually or if there are changes in her personal or family history. This may change how we assess her cancer risk, screening, and the testing we would offer.    Plan:  1.  A copy of the test results will be sent to Dae.  2. She plans to follow-up with her other providers.  3. She should contact me regularly, or sooner if her family history changes.    If Dae has any further questions, I encouraged her to contact me via Yatango.    Time spent on video: 5 minutes.    Joseph Oscar MS, [facility] [place]  Licensed, Certified Genetic Counselor      Virtual Visit Details    Type of service:  Video Visit     Originating Location (pt. Location): Home  Distant Location (provider location):  Off-site  Platform used for Video Visit: Precious

## 2024-05-07 ENCOUNTER — OFFICE VISIT (OUTPATIENT)
Dept: SURGERY | Facility: CLINIC | Age: 49
End: 2024-05-07
Payer: COMMERCIAL

## 2024-05-07 VITALS
HEART RATE: 73 BPM | WEIGHT: 115 LBS | BODY MASS INDEX: 19.63 KG/M2 | DIASTOLIC BLOOD PRESSURE: 60 MMHG | HEIGHT: 64 IN | SYSTOLIC BLOOD PRESSURE: 90 MMHG

## 2024-05-07 DIAGNOSIS — N60.19 FIBROCYSTIC BREAST DISEASE (FCBD), UNSPECIFIED LATERALITY: ICD-10-CM

## 2024-05-07 DIAGNOSIS — Z12.39 BREAST CANCER SCREENING, HIGH RISK PATIENT: ICD-10-CM

## 2024-05-07 DIAGNOSIS — N64.4 BREAST PAIN, RIGHT: Primary | ICD-10-CM

## 2024-05-07 PROCEDURE — 99214 OFFICE O/P EST MOD 30 MIN: CPT | Performed by: SURGERY

## 2024-05-07 RX ORDER — ESTRADIOL 0.5 MG/1
0.5 TABLET ORAL DAILY
COMMUNITY

## 2024-05-07 RX ORDER — DIAZEPAM 5 MG
5 TABLET ORAL EVERY 6 HOURS PRN
Qty: 2 TABLET | Refills: 0 | Status: SHIPPED | OUTPATIENT
Start: 2024-05-07

## 2024-05-07 NOTE — LETTER
May 9, 2024          Nataliya Quiroz MD, MD  [facility]  [address]  [place] [address]      RE:   Dae Castañeda 1975      Dear Colleague,    Thank you for referring your patient, Dae Castañeda, to [facility]. Please see a copy of my visit note below.     Dae Castañeda is a 49 year old female who is seen in follow up for skin changes and increased lifetime risk of breast cancer.     Since her last visit she had genetic testing which was negative.Her lifetime risk for breast cancer is 26.5% using the LORENZO score calculator.       She had a screening mammogram on 4/10/2024 which was benign.  She had had a screening MRI on 10/6/2023 which was also benign.  She was noted to have extreme fibroglandular tissue and background parenchymal enhancement.     In 2020 she had a ultrasound-guided core needle biopsy on her right breast which revealed a benign fibroadenoma.  She also recently had a precancerous duodenal mass removed endoscopically, a hysterectomy and appendectomy. She has also been dealing with a tremor and is seeing neurology for this.     She reports she has noticed a contour change on her right lower breast.  She has also noticed some new lines on her left breast.  She denies any nipple discharge or drainage.  She has some breast tenderness bilaterally.     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 .      Family history of breast cancer: Yes - mother diagnosed at 59 and had two separate types of cancer (IDC and a separate TN invasive carcinoma - this was not visible on mammo or MRI and only on physical exam) and maternal grandmother  Family history of ovarian cancer:  No  Family history of colon cancer: No  Family history of prostate cancer: No     Past  Medical History        Past Medical History:   Diagnosis Date    Abnormal involuntary movement 2017     Author: Shaheed Freeman MD         Service: Hospitalist         Author Type: Physician                           Filed: 2015  8:10 PM       Note Time: 2015 10:35 AM         Status: Signed               : Shaheed Freeman MD (Physician)                              Expand All Collapse All    DATE OF ADMISSION:  2015      DATE OF DISCHARGE:  2015      PRIMARY CARE PHYSICIAN:  Nancy Brady MD      CHIEF COMPLAINT:  Involuntary jerky movements.        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 Orders               EEG                             Expand All Collapse All    ONE-HOUR VIDEO ELECTROENCEPHALOGRAM       ORDERING PHYSICIAN:  Lito Jasmine MD       EEG #:        This EEG was done on [facility] with video monitoring recording of the spell.  EEG demonstrated some sharp wave activity bilaterally that is not rhythmic.  The patient otherwise had a spell that started with tingling and d    H/O magnetic resonance imaging of brain and brain stem 3/6/2017     2017 normal brain mri    Major depressive disorder, single episode, mild (H24)      chronic    Myalgia and myositis, unspecified      episodic low back and hip pain    Ruptured ovarian cyst 2011     right, requiring laparoscopy for drainage intra-abdominal hematoma    Serotonin syndrome       SSRI related (paroxetine and venlaxine), also triggered by anesthetic agents in     Supervision of other normal pregnancy        - vaginal, pergonal for infertility            Past Surgical History         Past Surgical History:   Procedure Laterality Date    ASPIRATION AND CURETTAGE    3/2008     Missed AB following in vitro fertilzation/implantation    DAVINCI HYSTERECTOMY TOTAL, BILATERAL SALPINGO-OOPHORECTOMY, COMBINED N/A 10/5/2022     Procedure: ROBOTIC ASSISTED TOTAL HYSTERECTOMY, LEFT SALPINGO-OOPHORECTOMY, RIGHT SALPINGECTOMY,LYSIS OF ADHESIONS;  Surgeon: Shine Collazo MD;  Location:  OR    GYN SURGERY         ruptured ovarian cyst    HC REPAIR OF NASAL SEPTUM   2000    LAPAROSCOPIC APPENDECTOMY N/A 2021     Procedure: LAPAROSCOPIC APPENDECTOMY;  Surgeon: Nazario Contreras MD;  Location:  OR    LAPAROSCOPY   2011     for drainage intra-abdominal hematoma s/p ruptured right ovarian cyst            Family History         Family History   Problem Relation Age of Onset    Lipids Mother      Thyroid Disease Mother           hypothyroidism    Breast Cancer Mother 61    Depression Father      Hypertension Father      C.A.D. Father            MI age 64    Diabetes Maternal Grandmother           type 2    Diabetes Maternal Grandfather           type 2    C.A.D. Maternal Grandfather           multiple MIs, onset age 50s    Tremor Maternal Grandfather      Prostate Cancer Paternal Grandfather            age 70s    Medical History Unknown Daughter      Glaucoma No family hx of      Macular Degeneration No family hx of              Social History           Tobacco Use    Smoking status: Never    Smokeless tobacco: Never   Substance Use Topics    Alcohol use: Yes             Patient Active Problem List   Diagnosis    Chromosome abnormality    Myalgia and myositis    Female infertility associated with anovulation    CARDIOVASCULAR SCREENING; LDL GOAL LESS THAN 160    Mild Depression  [296.21]      Serotonin syndrome    Myoclonic jerking    Fibromyalgia    H/O electroencephalogram    Abnormal involuntary movement    H/O magnetic resonance imaging of brain and brain stem    Knee pain    Abdominal pain, generalized    Mass of duodenum    Elevated lipase    Acute  appendicitis with generalized peritonitis, without gangrene or abscess, unspecified whether perforation present    Muscle twitching      Allergies         Allergies   Allergen Reactions    No Clinical Screening - See Comments         Other reaction(s): Other, see comments  PN: Don't give medications that increase serotonin level, will end up in ICU.     Codeine         Other reaction(s): Other, see comments  PN:  Nausea    Gabapentin         Serotonin syndrome    Latex         Added based on information entered during case entry, please review and add reactions, type, and severity as needed    Morphine And Related GI Disturbance    Paroxetine         serotonin syndrome, all seratonin drugs    Paroxetine         PN: ho serotonin syndrome per pet    Venlafaxine         serotonin syndrome         Current Outpatient Prescriptions          Current Outpatient Medications   Medication Sig Dispense Refill    acetaminophen (TYLENOL) 325 MG tablet Take 2 tablets (650 mg) by mouth every 4 hours as needed for other (mild pain) 100 tablet 0    diazepam (VALIUM) 5 MG tablet Take 1 tablet (5 mg) by mouth as needed for anxiety (Must have  there and back) 2 tablet 0    drospirenone-ethinyl estradiol (ELIS) 3-0.02 MG tablet Take 1 tablet by mouth daily        hydrOXYzine (ATARAX) 25 MG tablet Take 1 tablet (25 mg) by mouth every 6 hours as needed for itching or anxiety (with pain, moderate pain) 30 tablet 0    hydrOXYzine (ATARAX) 25 MG tablet Take 1 tablet (25 mg) by mouth 3 times daily as needed for other (pain or muscle twitching) 20 tablet 1    LORazepam (ATIVAN) 0.5 MG tablet Take 0.5 mg by mouth 2 times daily as needed for anxiety        ondansetron (ZOFRAN ODT) 4 MG ODT tab Take 1 tablet (4 mg) by mouth every 8 hours as needed for nausea 4 tablet 0    ondansetron (ZOFRAN ODT) 4 MG ODT tab Take 1-2 tablets (4-8 mg) by mouth every 8 hours as needed for nausea Dissolve ON the tongue. 10 tablet 0    ondansetron (ZOFRAN-ODT) 4  MG ODT tab Take 1 tablet (4 mg) by mouth every 6 hours as needed for nausea or vomiting 10 tablet 0    polyethylene glycol (MIRALAX) 17 GM/Dose powder Take 17 g by mouth daily as needed for constipation 510 g 0    senna-docusate (SENOKOT-S/PERICOLACE) 8.6-50 MG tablet Take 1-2 tablets by mouth 2 times daily 30 tablet 0    senna-docusate (SENOKOT-S/PERICOLACE) 8.6-50 MG tablet Take 1-2 tablets by mouth 2 times daily Take while on oral narcotics to prevent or treat constipation. 30 tablet 0    senna-docusate (SENOKOT-S/PERICOLACE) 8.6-50 MG tablet Take 1 tablet by mouth 2 times daily as needed for constipation (while on pain medications) 10 tablet 0         Vitals: There were no vitals taken for this visit.  BMI= There is no height or weight on file to calculate BMI.     EXAM:  GENERAL: healthy, alert and no distress   BREAST: Breasts appear symmetric.  Nipples are everted and appear normal bilaterally.  There is a mild contour asymmetry of the right lower inner breast when compared to the left.  There is also a mild indentation in the areola at 6:00.  There are no palpable masses in either breast.  There is no axillary or supraclavicular lymphadenopathy.  CARDIOVASCULAR:  RRR  RESPIRATORY: nonlabored breathing  NECK: Neck supple. No adenopathy. Thyroid symmetric, normal size  SKIN: No suspicious lesions or rashes  LYMPH: Normal cervical lymph nodes        ASSESSMENT/PLAN:  Dae Castañeda is a 49-year-old female with increased lifetime risk for breast cancer at 26.5%.  We reviewed her recent mammogram and on it it list no comparisons were made to prior imaging.  I called the breast center after her visit and discussed with Dr. Fady La who will discuss with the reading radiologist (Dr. Kelley) as he feels this was likely clicked inadvertently.  He reports they always will compare to prior mammograms.     Given her concerns and her increased lifetime risk I do think it warrants a breast ultrasound to  further evaluate this area on the right and this was ordered for her today.     We did discuss ongoing high risk screening going forward but also reviewed prophylactic mastectomies as an option.  Discussed she is tricky as she has a history of being somewhat slower to heal and increase risk related to surgery in that regard.  We also discussed her concerns related to imaging given her mother's breast cancer was not detected on mammogram or breast MRI.     For now we will proceed with right breast ultrasound and ongoing high risk screening and orders were placed for a breast MRI in 6 months and Valium sent to her pharmacy.       Again, thank you for allowing me to participate in the care of your patient.      Sincerely,      Jazmin Rosales MD

## 2024-05-07 NOTE — PROGRESS NOTES
[facility] Follow Up Note    CHIEF COMPLAINT:  Increased lifetime risk for breast cancer    HISTORY OF PRESENT ILLNESS:  Dae Castañeda is a 49 year old female who is seen in follow up for skin changes and increased lifetime risk of breast cancer.     Since her last visit she had genetic testing which was negative.Her lifetime risk for breast cancer is 26.5% using the LORENZO score calculator.      She had a screening mammogram on 4/10/2024 which was benign.  She had had a screening MRI on 10/6/2023 which was also benign.  She was noted to have extreme fibroglandular tissue and background parenchymal enhancement.     In 2020 she had a ultrasound-guided core needle biopsy on her right breast which revealed a benign fibroadenoma.  She also recently had a precancerous duodenal mass removed endoscopically, a hysterectomy and appendectomy. She has also been dealing with a tremor and is seeing neurology for this.     She reports she has noticed a contour change on her right lower breast.  She has also noticed some new lines on her left breast.  She denies any nipple discharge or drainage.  She has some breast tenderness bilaterally.     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 .      Family history of breast cancer: Yes - mother diagnosed at 59 and had two separate types of cancer (IDC and a separate TN invasive carcinoma - this was not visible on mammo or MRI and only on physical exam) and maternal grandmother  Family history of ovarian cancer:  No  Family history of colon cancer: No  Family history of prostate cancer: No    Past Medical History:   Diagnosis Date    Abnormal involuntary movement 1/31/2017    Author: Shaheed Freeman MD Service: Hospitalist Author Type: Physician    Filed: 5/14/2015  8:10 PM Note Time:  2015 10:35 AM Status: Signed   : Shaheed Freeman MD (Physician)     Expand All Collapse All    DATE OF ADMISSION:  2015      DATE OF DISCHARGE:  2015      PRIMARY CARE PHYSICIAN:  Nancy Brady MD      CHIEF COMPLAINT:  Involuntary jerky movements.        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 Orders    EEG        Expand All Collapse All    ONE-HOUR VIDEO ELECTROENCEPHALOGRAM       ORDERING PHYSICIAN:  Lito Jasmine MD       EEG #:        This EEG was done on [facility] with video monitoring recording of the spell.  EEG demonstrated some sharp wave activity bilaterally that is not rhythmic.  The patient otherwise had a spell that started with tingling and d    H/O magnetic resonance imaging of brain and brain stem 3/6/2017    2017 normal brain mri    Major depressive disorder, single episode, mild (H24)     chronic    Myalgia and myositis, unspecified     episodic low back and hip pain    Ruptured ovarian cyst 2011    right, requiring laparoscopy for drainage intra-abdominal hematoma    Serotonin syndrome     SSRI related (paroxetine and venlaxine), also triggered by anesthetic agents in     Supervision of other normal pregnancy      - vaginal, pergonal for infertility       Past Surgical History:   Procedure Laterality Date    ASPIRATION AND CURETTAGE  3/2008    Missed AB following in vitro fertilzation/implantation    DAVINCI HYSTERECTOMY TOTAL, BILATERAL SALPINGO-OOPHORECTOMY, COMBINED N/A 10/5/2022    Procedure: ROBOTIC ASSISTED TOTAL HYSTERECTOMY, LEFT SALPINGO-OOPHORECTOMY, RIGHT SALPINGECTOMY,LYSIS OF ADHESIONS;  Surgeon: Shine Collazo MD;  Location: [facility] OR    GYN SURGERY      ruptured ovarian cyst    HC REPAIR OF NASAL  SEPTUM  2000    LAPAROSCOPIC APPENDECTOMY N/A 2021    Procedure: LAPAROSCOPIC APPENDECTOMY;  Surgeon: Nazario Contreras MD;  Location: [facility] OR    LAPAROSCOPY  2011    for drainage intra-abdominal hematoma s/p ruptured right ovarian cyst       Family History   Problem Relation Age of Onset    Lipids Mother     Thyroid Disease Mother         hypothyroidism    Breast Cancer Mother 61    Depression Father     Hypertension Father     C.A.D. Father          MI age 64    Diabetes Maternal Grandmother         type 2    Diabetes Maternal Grandfather         type 2    C.A.D. Maternal Grandfather         multiple MIs, onset age 50s    Tremor Maternal Grandfather     Prostate Cancer Paternal Grandfather          age 70s    Medical History Unknown Daughter     Glaucoma No family hx of     Macular Degeneration No family hx of        Social History     Tobacco Use    Smoking status: Never    Smokeless tobacco: Never   Substance Use Topics    Alcohol use: Yes       Patient Active Problem List   Diagnosis    Chromosome abnormality    Myalgia and myositis    Female infertility associated with anovulation    CARDIOVASCULAR SCREENING; LDL GOAL LESS THAN 160    Mild Depression  [296.21]      Serotonin syndrome    Myoclonic jerking    Fibromyalgia    H/O electroencephalogram    Abnormal involuntary movement    H/O magnetic resonance imaging of brain and brain stem    Knee pain    Abdominal pain, generalized    Mass of duodenum    Elevated lipase    Acute appendicitis with generalized peritonitis, without gangrene or abscess, unspecified whether perforation present    Muscle twitching     Allergies   Allergen Reactions    No Clinical Screening - See Comments      Other reaction(s): Other, see comments  PN: Don't give medications that increase serotonin level, will end up in ICU.     Codeine      Other reaction(s): Other, see comments  PN:  Nausea    Gabapentin      Serotonin syndrome    Latex      Added based on  information entered during case entry, please review and add reactions, type, and severity as needed    Morphine And Related GI Disturbance    Paroxetine      serotonin syndrome, all seratonin drugs    Paroxetine      PN: ho serotonin syndrome per pet    Venlafaxine      serotonin syndrome     Current Outpatient Medications   Medication Sig Dispense Refill    acetaminophen (TYLENOL) 325 MG tablet Take 2 tablets (650 mg) by mouth every 4 hours as needed for other (mild pain) 100 tablet 0    diazepam (VALIUM) 5 MG tablet Take 1 tablet (5 mg) by mouth as needed for anxiety (Must have  there and back) 2 tablet 0    drospirenone-ethinyl estradiol (ELIS) 3-0.02 MG tablet Take 1 tablet by mouth daily      hydrOXYzine (ATARAX) 25 MG tablet Take 1 tablet (25 mg) by mouth every 6 hours as needed for itching or anxiety (with pain, moderate pain) 30 tablet 0    hydrOXYzine (ATARAX) 25 MG tablet Take 1 tablet (25 mg) by mouth 3 times daily as needed for other (pain or muscle twitching) 20 tablet 1    LORazepam (ATIVAN) 0.5 MG tablet Take 0.5 mg by mouth 2 times daily as needed for anxiety      ondansetron (ZOFRAN ODT) 4 MG ODT tab Take 1 tablet (4 mg) by mouth every 8 hours as needed for nausea 4 tablet 0    ondansetron (ZOFRAN ODT) 4 MG ODT tab Take 1-2 tablets (4-8 mg) by mouth every 8 hours as needed for nausea Dissolve ON the tongue. 10 tablet 0    ondansetron (ZOFRAN-ODT) 4 MG ODT tab Take 1 tablet (4 mg) by mouth every 6 hours as needed for nausea or vomiting 10 tablet 0    polyethylene glycol (MIRALAX) 17 GM/Dose powder Take 17 g by mouth daily as needed for constipation 510 g 0    senna-docusate (SENOKOT-S/PERICOLACE) 8.6-50 MG tablet Take 1-2 tablets by mouth 2 times daily 30 tablet 0    senna-docusate (SENOKOT-S/PERICOLACE) 8.6-50 MG tablet Take 1-2 tablets by mouth 2 times daily Take while on oral narcotics to prevent or treat constipation. 30 tablet 0    senna-docusate (SENOKOT-S/PERICOLACE) 8.6-50 MG tablet  Take 1 tablet by mouth 2 times daily as needed for constipation (while on pain medications) 10 tablet 0     Vitals: There were no vitals taken for this visit.  BMI= There is no height or weight on file to calculate BMI.    EXAM:  GENERAL: healthy, alert and no distress   BREAST: Breasts appear symmetric.  Nipples are everted and appear normal bilaterally.  There is a mild contour asymmetry of the right lower inner breast when compared to the left.  There is also a mild indentation in the areola at 6:00.  There are no palpable masses in either breast.  There is no axillary or supraclavicular lymphadenopathy.  CARDIOVASCULAR:  RRR  RESPIRATORY: nonlabored breathing  NECK: Neck supple. No adenopathy. Thyroid symmetric, normal size  SKIN: No suspicious lesions or rashes  LYMPH: Normal cervical lymph nodes      ASSESSMENT/PLAN:  Dae Castañeda is a 49-year-old female with increased lifetime risk for breast cancer at 26.5%.  We reviewed her recent mammogram and on it it list no comparisons were made to prior imaging.  I called the breast center after her visit and discussed with Dr. Fady La who will discuss with the reading radiologist (Dr. Kelley) as he feels this was likely clicked inadvertently.  He reports they always will compare to prior mammograms.    Given her concerns and her increased lifetime risk I do think it warrants a breast ultrasound to further evaluate this area on the right and this was ordered for her today.    We did discuss ongoing high risk screening going forward but also reviewed prophylactic mastectomies as an option.  Discussed she is tricky as she has a history of being somewhat slower to heal and increase risk related to surgery in that regard.  We also discussed her concerns related to imaging given her mother's breast cancer was not detected on mammogram or breast MRI.    For now we will proceed with right breast ultrasound and ongoing high risk screening and orders were  placed for a breast MRI in 6 months and Valium sent to her pharmacy.    30 minutes total time spent on the date of this encounter doing: chart review, review of test results, patient visit, physical exam, education, counseling, developing plan of care, and documenting.      Jazmin Rosales MD  [facility]  [phone]        Please route or send letter to:  Primary Care Provider (PCP) and Referring Provider    [facility] Follow Up Note

## 2024-05-16 ENCOUNTER — HOSPITAL ENCOUNTER (OUTPATIENT)
Dept: MAMMOGRAPHY | Facility: CLINIC | Age: 49
Discharge: HOME | End: 2024-05-16
Attending: SURGERY
Payer: COMMERCIAL

## 2024-05-16 DIAGNOSIS — N64.59 SYMPTOMS IN BREAST: ICD-10-CM

## 2024-05-16 PROCEDURE — 76642 ULTRASOUND BREAST LIMITED: CPT | Mod: 50

## 2024-06-10 ENCOUNTER — TELEPHONE (OUTPATIENT)
Dept: PULMONOLOGY | Facility: CLINIC | Age: 49
End: 2024-06-10
Payer: COMMERCIAL

## 2024-06-10 DIAGNOSIS — R05.9 COUGH: Primary | ICD-10-CM

## 2024-06-10 NOTE — TELEPHONE ENCOUNTER
[facility] Call Center    Phone Message    May a detailed message be left on voicemail: yes     Reason for Call: Appointment Intake    Referring Provider Name: Dr. Sridhar Carmen/ [facility]   Diagnosis and/or Symptoms: cough, ct scan pulmonary nodules 06/05/2024.       Pt has an apt already schedule with [facility] 06/26/2024, but would like to be seen sooner than that. She stated she has been coughing none stop, had a CT scan done 06/05/2024 with  and found some nodules. Pt is currently schedule for PFT with YOLAN[facility] 06/24/2024 and apt with Nickie Desai 06/28/2024. Please review and advise if pt should be seen sooner and if okay to schedule with general pulm.      Action Taken: Other: Pulm    Travel Screening: Not Applicable     Date of Service:

## 2024-06-10 NOTE — TELEPHONE ENCOUNTER
[place] [facility] does not have any sooner openings for new pulmonary patients. Patient is to keep 6/28/24 appointment which is the soonest available.    Destiny Rivera RN on 6/10/2024 at 12:17 PM

## 2024-10-24 ENCOUNTER — HOSPITAL ENCOUNTER (OUTPATIENT)
Dept: MRI IMAGING | Facility: CLINIC | Age: 49
Discharge: HOME | End: 2024-10-24
Attending: SURGERY | Admitting: SURGERY
Payer: COMMERCIAL

## 2024-10-24 DIAGNOSIS — N60.19 FIBROCYSTIC BREAST DISEASE (FCBD), UNSPECIFIED LATERALITY: ICD-10-CM

## 2024-10-24 DIAGNOSIS — N64.4 BREAST PAIN, RIGHT: ICD-10-CM

## 2024-10-24 DIAGNOSIS — Z12.39 BREAST CANCER SCREENING, HIGH RISK PATIENT: ICD-10-CM

## 2024-10-24 PROCEDURE — A9585 GADOBUTROL INJECTION: HCPCS | Performed by: SURGERY

## 2024-10-24 PROCEDURE — 77049 MRI BREAST C-+ W/CAD BI: CPT

## 2024-10-24 PROCEDURE — 255N000002 HC RX 255 OP 636: Performed by: SURGERY

## 2024-10-24 RX ORDER — GADOBUTROL 604.72 MG/ML
5 INJECTION INTRAVENOUS ONCE
Status: COMPLETED | OUTPATIENT
Start: 2024-10-24 | End: 2024-10-24

## 2024-10-24 RX ADMIN — GADOBUTROL 5 ML: 604.72 INJECTION INTRAVENOUS at 11:29

## 2024-12-21 ENCOUNTER — HEALTH MAINTENANCE LETTER (OUTPATIENT)
Age: 49
End: 2024-12-21

## 2024-12-23 ENCOUNTER — TELEPHONE (OUTPATIENT)
Dept: CARDIOLOGY | Facility: CLINIC | Age: 49
End: 2024-12-23
Payer: COMMERCIAL

## 2024-12-23 NOTE — TELEPHONE ENCOUNTER
Ways to Help Prevent Falls at Home    Quick Tips   ? Ask for help if you need it. Most people want to help!   ? Get up slowly after sitting or laying down   ? Wear a medical alert device or keep cell phone in your pocket   ? Use night lights, especially areas near a bathroom   ? Keep the items you use often within reach on a small stool or end table   ? Use an assistive device such as walker or cane, as directed by provider/physical therapy   ? Use a non-slip mat and grab bars in your bathroom. Look for home health sections for best options     Other Areas to Focus On   ? Exercise and nutrition: Regular exercise or taking a falls prevention class are great ways improve strength and balance. Don’t forget to stay hydrated and bring a snack!   ? Medicine side effects: Some medicines can make you sleepy or dizzy, which could cause a fall. Ask your healthcare provider about the side effects your medicines could cause. Be sure to let them know if you take any vitamins or supplements as well.   ? Tripping hazards: Remove items you could trip on, such as loose mats, rugs, cords, and clutter. Wear closed toe shoes with rubber soles.   ? Health and wellness: Get regular checkups with your healthcare provider, plus routine vision and hearing screenings. Talk with your healthcare provider about:   o Your medicines and the possible side effects - bring them in a bag if that is easier!   o Problems with balance or feeling dizzy   o Ways to promote bone health, such as Vitamin D and calcium supplements   o Questions or concerns about falling     *Ask your healthcare team if you have questions     ©[facility], 2022    [facility] Call Center    Phone Message    May a detailed message be left on voicemail: yes     Reason for Call: Appointment Intake    Referring Provider Name: Self  Diagnosis and/or Symptoms: Abnormal Heart Rate (104-107 Resting), Palpitations, Family History of Heart Attacks    Pt scheduled 1/29/25, outside 30 day window.     Pt also wondering if a nurse can call her back about healthy ways to bring heart rate back down. Pt has used alcohol in the past and does not want to use that again. Please review.     Action Taken: Message routed to:  Other: [facility]    Travel Screening: Not Applicable     Date of Service:

## 2024-12-23 NOTE — TELEPHONE ENCOUNTER
Called patient to discuss issues. She states she has had tremors for a year and thinks they are caused by her rapid heart rate. She prefers to treat things the natural way and asked for other ideas than alcohol.    Discussed with patient that alcohol is a trigger for some arrhythmias and is not recommended. Reviewed with patient that without data we have no recommendations. Suggested seeing Urgent Care if she is feeling unwell with her heart rate.    She wondered about taking calcium tablets to show her heart rate. Reviewed with patient that we need lab work to decided if she needs any replacement medications.     Reviewed that she is not a current patient, so our options are limited to discuss care.   Patient to see Dr. Westbrook on 1/29/2025 as a NEW consult.

## 2025-03-31 ENCOUNTER — OFFICE VISIT (OUTPATIENT)
Dept: CARDIOLOGY | Facility: CLINIC | Age: 50
End: 2025-03-31
Payer: COMMERCIAL

## 2025-03-31 VITALS
BODY MASS INDEX: 20.14 KG/M2 | OXYGEN SATURATION: 100 % | SYSTOLIC BLOOD PRESSURE: 100 MMHG | WEIGHT: 118 LBS | DIASTOLIC BLOOD PRESSURE: 66 MMHG | HEIGHT: 64 IN | HEART RATE: 79 BPM

## 2025-03-31 DIAGNOSIS — Z13.6 CARDIOVASCULAR SCREENING; LDL GOAL LESS THAN 160: Primary | ICD-10-CM

## 2025-03-31 PROCEDURE — 93000 ELECTROCARDIOGRAM COMPLETE: CPT | Performed by: INTERNAL MEDICINE

## 2025-03-31 PROCEDURE — 99204 OFFICE O/P NEW MOD 45 MIN: CPT | Performed by: INTERNAL MEDICINE

## 2025-03-31 PROCEDURE — G2211 COMPLEX E/M VISIT ADD ON: HCPCS | Performed by: INTERNAL MEDICINE

## 2025-03-31 RX ORDER — ESTRADIOL 1 MG/1
1 TABLET ORAL DAILY
COMMUNITY

## 2025-03-31 NOTE — PROGRESS NOTES
CARDIOLOGY CLINIC CONSULTATION    PRIMARY CARE PHYSICIAN:  Nataliya Quiroz MD    Tests reviewed/interpreted independently in clinic today:   EKG, Echocardiogram, Blood work.  EKG shows sinus rhythm with no ST changes    LDL: 93 >>>152 mg/dl  HDL 90>>>122 mg/dl  TC  201>>>298 mg/dl     The level of medical decision making during this visit was of moderate complexity.     The longitudinal plan of care for the diagnosis(es)/condition(s) as documented were addressed during this visit. Due to the added complexity in care, I will continue to support Dae in the subsequent management and with ongoing continuity of care.     HISTORY OF PRESENT ILLNESS:  Today, I had the pleasure of connecting with Dae Castañeda.  She is a very pleasant 49-year-old lady who presents to the clinic in initial consultation for palpitations and family history of premature coronary disease.  Prior to making appointment he had noticed on and off palpitation which she describes as fluttering.  However for the past many weeks she has not noticed the symptoms.  There were no exacerbating or relieving factors and no associated lightheadedness or dizziness or chest pain.    In addition to the above symptoms she also reports family history of premature coronary disease with multiple first-degree relatives having heart attacks in his late 40s and early 50s.  This includes her great grandfather, father and uncles.  Her mother does not have premature coronary disease.  The patient had a CT of the chest for noncardiac reason sometime ago and it was reported to have no coronary calcification.    She lives a very healthy lifestyle which includes staying active and eating heart healthy diet.  Denies chest pain, shortness of breath, orthopnea, PND, lower extremity edema, presyncope or syncope.          Assessment and Impression:      Pertinent issues addressed/ reviewed during this cardiology visit    Palpitations-resolved  Premature history  of coronary disease in multiple first-degree relatives  Hyperlipidemia- mg/dL,  mg/dL         Recommendations and Plan:     It was a pleasure to see Dae Castañeda in clinic today.  Her 10-year ASCVD risk score is less than 1% and she does not meet criteria to be on a statin.  I discussed lifestyle modifications with her.  I do not believe there is a relation to doing a Zio patch given that her symptoms have resolved.  I did however tell her to contact us in case symptoms recur and we will do some sort of rhythm monitor.  I do not believe there is a necessity to start her on any AV alvina blocking agents at this time.  I perform a transthoracic echocardiogram for screening purposes.  I will reach out to her with the results of the test.    Teofilo MAHAN, [facility], [facility]  Cardiology - [facility]  March 31, 2025    Orders Placed This Encounter   Procedures    EKG 12-lead complete w/read - Clinics (performed today)       PAST MEDICAL HISTORY:  Past Medical History:   Diagnosis Date    Abnormal involuntary movement 1/31/2017    Author: Shaheed Freeman MD Service: Hospitalist Author Type: Physician    Filed: 5/14/2015  8:10 PM Note Time: 5/14/2015 10:35 AM Status: Signed   : Shaheed Freeman MD (Physician)     Expand All Collapse All    DATE OF ADMISSION:  05/12/2015      DATE OF DISCHARGE:  05/14/2015      PRIMARY CARE PHYSICIAN:  Nancy Brady MD      CHIEF COMPLAINT:  Involuntary jerky movements.        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 Orders    EEG        Expand All Collapse All    ONE-HOUR VIDEO ELECTROENCEPHALOGRAM       ORDERING PHYSICIAN:  Lito Jasmine MD       EEG #:        This EEG was done on [facility] with video monitoring recording of the  spell.  EEG demonstrated some sharp wave activity bilaterally that is not rhythmic.  The patient otherwise had a spell that started with tingling and d    H/O magnetic resonance imaging of brain and brain stem 3/6/2017    2017 normal brain mri    Major depressive disorder, single episode, mild     chronic    Myalgia and myositis, unspecified     episodic low back and hip pain    Ruptured ovarian cyst 2011    right, requiring laparoscopy for drainage intra-abdominal hematoma    Serotonin syndrome     SSRI related (paroxetine and venlaxine), also triggered by anesthetic agents in     Supervision of other normal pregnancy      - vaginal, pergonal for infertility       MEDICATIONS:  Current Outpatient Medications   Medication Sig Dispense Refill    diazepam (VALIUM) 5 MG tablet Take 1 tablet (5 mg) by mouth every 6 hours as needed for anxiety (take prior to breast MRI) 2 tablet 0    diazepam (VALIUM) 5 MG tablet Take 1 tablet (5 mg) by mouth as needed for anxiety (Must have  there and back) 2 tablet 0    estradiol (ESTRACE) 1 MG tablet Take 1 mg by mouth daily.      hydrOXYzine (ATARAX) 25 MG tablet Take 1 tablet (25 mg) by mouth 3 times daily as needed for other (pain or muscle twitching) (Patient taking differently: Take 25 mg by mouth. Take 2 tablets (50mg) by mouth at bedtime) 20 tablet 1    LORazepam (ATIVAN) 0.5 MG tablet Take 0.5 mg by mouth 2 times daily as needed for anxiety       No current facility-administered medications for this visit.       ALLERGIES:  Allergies   Allergen Reactions    No Clinical Screening - See Comments      Other reaction(s): Other, see comments  PN: Don't give medications that increase serotonin level, will end up in ICU.     Codeine      Other reaction(s): Other, see comments  PN:  Nausea    Gabapentin      Serotonin syndrome    Latex      Added based on information entered during case entry, please review and add reactions, type, and severity as  "needed    Morphine And Codeine GI Disturbance    Paroxetine      serotonin syndrome, all seratonin drugs    Paroxetine      PN: ho serotonin syndrome per pet    Venlafaxine      serotonin syndrome       SOCIAL HISTORY:  I have reviewed this patient's social history and updated it with pertinent information if needed. Dae Castañeda  reports that she has never smoked. She has never used smokeless tobacco. She reports current alcohol use. She reports that she does not use drugs.    FAMILY HISTORY:  I have reviewed this patient's family history and updated it with pertinent information if needed.   Family History   Problem Relation Age of Onset    Lipids Mother     Thyroid Disease Mother         hypothyroidism    Breast Cancer Mother 61    Depression Father     Hypertension Father     C.A.D. Father          MI age 64    Diabetes Maternal Grandmother         type 2    Diabetes Maternal Grandfather         type 2    C.A.D. Maternal Grandfather         multiple MIs, onset age 50s    Tremor Maternal Grandfather     Prostate Cancer Paternal Grandfather          age 70s    Medical History Unknown Daughter     Glaucoma No family hx of     Macular Degeneration No family hx of        REVIEW OF SYSTEMS:  Skin:        Eyes:       ENT:       Respiratory:  Negative shortness of breath  Cardiovascular:  Negative, chest pain, fatigue, dizziness Positive for, palpitations, edema, lightheadedness  Gastroenterology:      Genitourinary:       Musculoskeletal:       Neurologic:       Psychiatric:       Heme/Lymph/Imm:       Endocrine:           PHYSICAL EXAM:  /66 (BP Location: Right arm, Patient Position: Sitting)   Pulse 79   Ht 1.626 m (5' 4\")   Wt 53.5 kg (118 lb)   SpO2 100%   BMI 20.25 kg/m    Constitutional: alert, no distress  Respiratory: Good bilateral air entry  Cardiovascular: s1 s2 normal, no murmurs  GI: nondistended  Neuropsychiatric: appropriate affact  Edema: none    This note was completed " in part using dictation via the Dragon voice recognition software. Some word and grammatical errors may occur and must be interpreted in the appropriate clinical context.  If there are any questions pertaining to this issue, please contact me for further clarification.

## 2025-03-31 NOTE — LETTER
3/31/2025    Nataliya Quiroz MD, MD  [facility] [address]  [facility] [address]    RE: Dae Castañeda       Dear Colleague,     I had the pleasure of seeing Dae Castañeda in the [facility].      CARDIOLOGY CLINIC CONSULTATION    PRIMARY CARE PHYSICIAN:  Nataliya Quiroz MD    Tests reviewed/interpreted independently in clinic today:   EKG, Echocardiogram, Blood work.  EKG shows sinus rhythm with no ST changes    LDL: 93 >>>152 mg/dl  HDL 90>>>122 mg/dl  TC  201>>>298 mg/dl     The level of medical decision making during this visit was of moderate complexity.     The longitudinal plan of care for the diagnosis(es)/condition(s) as documented were addressed during this visit. Due to the added complexity in care, I will continue to support Dae in the subsequent management and with ongoing continuity of care.     HISTORY OF PRESENT ILLNESS:  Today, I had the pleasure of connecting with Dae Castañeda.  She is a very pleasant 49-year-old lady who presents to the clinic in initial consultation for palpitations and family history of premature coronary disease.  Prior to making appointment he had noticed on and off palpitation which she describes as fluttering.  However for the past many weeks she has not noticed the symptoms.  There were no exacerbating or relieving factors and no associated lightheadedness or dizziness or chest pain.    In addition to the above symptoms she also reports family history of premature coronary disease with multiple first-degree relatives having heart attacks in his late 40s and early 50s.  This includes her great grandfather, father and uncles.  Her mother does not have premature coronary disease.  The patient had a CT of the chest for noncardiac reason sometime ago and it was reported to have no coronary calcification.    She lives a very healthy lifestyle which includes staying active and eating heart healthy diet.   Denies chest pain, shortness of breath, orthopnea, PND, lower extremity edema, presyncope or syncope.          Assessment and Impression:      Pertinent issues addressed/ reviewed during this cardiology visit    Palpitations-resolved  Premature history of coronary disease in multiple first-degree relatives  Hyperlipidemia- mg/dL,  mg/dL         Recommendations and Plan:     It was a pleasure to see Dae Castañeda in clinic today.  Her 10-year ASCVD risk score is less than 1% and she does not meet criteria to be on a statin.  I discussed lifestyle modifications with her.  I do not believe there is a relation to doing a Zio patch given that her symptoms have resolved.  I did however tell her to contact us in case symptoms recur and we will do some sort of rhythm monitor.  I do not believe there is a necessity to start her on any AV alvina blocking agents at this time.  I perform a transthoracic echocardiogram for screening purposes.  I will reach out to her with the results of the test.    Teofilo FAYEBS, FACC, [facility]  Cardiology - [facility]  March 31, 2025    Orders Placed This Encounter   Procedures     EKG 12-lead complete w/read - Clinics (performed today)       PAST MEDICAL HISTORY:  Past Medical History:   Diagnosis Date     Abnormal involuntary movement 1/31/2017    Author: Shaheed Freeman MD Service: Hospitalist Author Type: Physician    Filed: 5/14/2015  8:10 PM Note Time: 5/14/2015 10:35 AM Status: Signed   : Shaheed Freeman MD (Physician)     Expand All Collapse All    DATE OF ADMISSION:  05/12/2015      DATE OF DISCHARGE:  05/14/2015      PRIMARY CARE PHYSICIAN:  Nancy Brady MD      CHIEF COMPLAINT:  Involuntary jerky movements.         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 Orders    EEG        Expand All Collapse All    ONE-HOUR VIDEO ELECTROENCEPHALOGRAM       ORDERING PHYSICIAN:  Lito Jasmine MD       EEG #:        This EEG was done on [facility] with video monitoring recording of the spell.  EEG demonstrated some sharp wave activity bilaterally that is not rhythmic.  The patient otherwise had a spell that started with tingling and d     H/O magnetic resonance imaging of brain and brain stem 3/6/2017    2017 normal brain mri     Major depressive disorder, single episode, mild     chronic     Myalgia and myositis, unspecified     episodic low back and hip pain     Ruptured ovarian cyst 2011    right, requiring laparoscopy for drainage intra-abdominal hematoma     Serotonin syndrome     SSRI related (paroxetine and venlaxine), also triggered by anesthetic agents in      Supervision of other normal pregnancy      - vaginal, pergonal for infertility       MEDICATIONS:  Current Outpatient Medications   Medication Sig Dispense Refill     diazepam (VALIUM) 5 MG tablet Take 1 tablet (5 mg) by mouth every 6 hours as needed for anxiety (take prior to breast MRI) 2 tablet 0     diazepam (VALIUM) 5 MG tablet Take 1 tablet (5 mg) by mouth as needed for anxiety (Must have  there and back) 2 tablet 0     estradiol (ESTRACE) 1 MG tablet Take 1 mg by mouth daily.       hydrOXYzine (ATARAX) 25 MG tablet Take 1 tablet (25 mg) by mouth 3 times daily as needed for other (pain or muscle twitching) (Patient taking differently: Take 25 mg by mouth. Take 2 tablets (50mg) by mouth at bedtime) 20 tablet 1     LORazepam (ATIVAN) 0.5 MG tablet Take 0.5 mg by mouth 2 times daily as needed for anxiety       No current facility-administered medications for this visit.       ALLERGIES:  Allergies   Allergen Reactions     No Clinical Screening - See Comments      Other reaction(s): Other, see comments  PN: Don't give medications that  increase serotonin level, will end up in ICU.      Codeine      Other reaction(s): Other, see comments  PN:  Nausea     Gabapentin      Serotonin syndrome     Latex      Added based on information entered during case entry, please review and add reactions, type, and severity as needed     Morphine And Codeine GI Disturbance     Paroxetine      serotonin syndrome, all seratonin drugs     Paroxetine      PN: ho serotonin syndrome per pet     Venlafaxine      serotonin syndrome       SOCIAL HISTORY:  I have reviewed this patient's social history and updated it with pertinent information if needed. Dae Castañeda  reports that she has never smoked. She has never used smokeless tobacco. She reports current alcohol use. She reports that she does not use drugs.    FAMILY HISTORY:  I have reviewed this patient's family history and updated it with pertinent information if needed.   Family History   Problem Relation Age of Onset     Lipids Mother      Thyroid Disease Mother         hypothyroidism     Breast Cancer Mother 61     Depression Father      Hypertension Father      C.A.D. Father          MI age 64     Diabetes Maternal Grandmother         type 2     Diabetes Maternal Grandfather         type 2     C.A.D. Maternal Grandfather         multiple MIs, onset age 50s     Tremor Maternal Grandfather      Prostate Cancer Paternal Grandfather          age 70s     Medical History Unknown Daughter      Glaucoma No family hx of      Macular Degeneration No family hx of        REVIEW OF SYSTEMS:  Skin:        Eyes:       ENT:       Respiratory:  Negative shortness of breath  Cardiovascular:  Negative, chest pain, fatigue, dizziness Positive for, palpitations, edema, lightheadedness  Gastroenterology:      Genitourinary:       Musculoskeletal:       Neurologic:       Psychiatric:       Heme/Lymph/Imm:       Endocrine:           PHYSICAL EXAM:  /66 (BP Location: Right arm, Patient Position: Sitting)    "Pulse 79   Ht 1.626 m (5' 4\")   Wt 53.5 kg (118 lb)   SpO2 100%   BMI 20.25 kg/m    Constitutional: alert, no distress  Respiratory: Good bilateral air entry  Cardiovascular: s1 s2 normal, no murmurs  GI: nondistended  Neuropsychiatric: appropriate affact  Edema: none    This note was completed in part using dictation via the Dragon voice recognition software. Some word and grammatical errors may occur and must be interpreted in the appropriate clinical context.  If there are any questions pertaining to this issue, please contact me for further clarification.      Thank you for allowing me to participate in the care of your patient.      Sincerely,     Teofilo Hernandez MD     [facility]  cc:   Referred Self, MD  No address on file      "

## 2025-05-20 ENCOUNTER — ANCILLARY PROCEDURE (OUTPATIENT)
Dept: MAMMOGRAPHY | Facility: CLINIC | Age: 50
End: 2025-05-20
Attending: FAMILY MEDICINE
Payer: COMMERCIAL

## 2025-05-20 DIAGNOSIS — Z12.31 VISIT FOR SCREENING MAMMOGRAM: ICD-10-CM

## 2025-05-20 PROCEDURE — 77063 BREAST TOMOSYNTHESIS BI: CPT | Mod: TC | Performed by: RADIOLOGY

## 2025-05-20 PROCEDURE — 77067 SCR MAMMO BI INCL CAD: CPT | Mod: TC | Performed by: RADIOLOGY

## 2025-07-07 ENCOUNTER — LAB REQUISITION (OUTPATIENT)
Dept: LAB | Facility: CLINIC | Age: 50
End: 2025-07-07

## 2025-07-07 DIAGNOSIS — L65.9 NONSCARRING HAIR LOSS, UNSPECIFIED: ICD-10-CM

## 2025-07-07 DIAGNOSIS — N95.1 MENOPAUSAL AND FEMALE CLIMACTERIC STATES: ICD-10-CM

## 2025-07-07 LAB
BASOPHILS # BLD AUTO: 0 10E3/UL (ref 0–0.2)
BASOPHILS NFR BLD AUTO: 1 %
EOSINOPHIL # BLD AUTO: 0.1 10E3/UL (ref 0–0.7)
EOSINOPHIL NFR BLD AUTO: 2 %
ERYTHROCYTE [DISTWIDTH] IN BLOOD BY AUTOMATED COUNT: 12.4 % (ref 10–15)
HCT VFR BLD AUTO: 43.5 % (ref 35–47)
HGB BLD-MCNC: 14 G/DL (ref 11.7–15.7)
IMM GRANULOCYTES # BLD: 0 10E3/UL
IMM GRANULOCYTES NFR BLD: 1 %
LYMPHOCYTES # BLD AUTO: 1.5 10E3/UL (ref 0.8–5.3)
LYMPHOCYTES NFR BLD AUTO: 26 %
MCH RBC QN AUTO: 30 PG (ref 26.5–33)
MCHC RBC AUTO-ENTMCNC: 32.2 G/DL (ref 31.5–36.5)
MCV RBC AUTO: 93 FL (ref 78–100)
MONOCYTES # BLD AUTO: 0.7 10E3/UL (ref 0–1.3)
MONOCYTES NFR BLD AUTO: 11 %
NEUTROPHILS # BLD AUTO: 3.6 10E3/UL (ref 1.6–8.3)
NEUTROPHILS NFR BLD AUTO: 60 %
NRBC # BLD AUTO: 0 10E3/UL
NRBC BLD AUTO-RTO: 0 /100
PLATELET # BLD AUTO: 273 10E3/UL (ref 150–450)
RBC # BLD AUTO: 4.67 10E6/UL (ref 3.8–5.2)
WBC # BLD AUTO: 5.9 10E3/UL (ref 4–11)

## 2025-07-07 PROCEDURE — 83540 ASSAY OF IRON: CPT | Performed by: OBSTETRICS & GYNECOLOGY

## 2025-07-07 PROCEDURE — 85025 COMPLETE CBC W/AUTO DIFF WBC: CPT | Performed by: OBSTETRICS & GYNECOLOGY

## 2025-07-07 PROCEDURE — 82607 VITAMIN B-12: CPT | Performed by: OBSTETRICS & GYNECOLOGY

## 2025-07-07 PROCEDURE — 84443 ASSAY THYROID STIM HORMONE: CPT | Performed by: OBSTETRICS & GYNECOLOGY

## 2025-07-07 PROCEDURE — 82670 ASSAY OF TOTAL ESTRADIOL: CPT | Performed by: OBSTETRICS & GYNECOLOGY

## 2025-07-07 PROCEDURE — 82310 ASSAY OF CALCIUM: CPT | Performed by: OBSTETRICS & GYNECOLOGY

## 2025-07-07 PROCEDURE — 82728 ASSAY OF FERRITIN: CPT | Performed by: OBSTETRICS & GYNECOLOGY

## 2025-07-07 PROCEDURE — 84466 ASSAY OF TRANSFERRIN: CPT | Performed by: OBSTETRICS & GYNECOLOGY

## 2025-07-08 ENCOUNTER — TRANSCRIBE ORDERS (OUTPATIENT)
Dept: OTHER | Age: 50
End: 2025-07-08

## 2025-07-08 DIAGNOSIS — N94.12 DEEP DYSPAREUNIA: Primary | ICD-10-CM

## 2025-07-08 LAB
ALBUMIN SERPL BCG-MCNC: 4.5 G/DL (ref 3.5–5.2)
ALP SERPL-CCNC: 64 U/L (ref 40–150)
ALT SERPL W P-5'-P-CCNC: 14 U/L (ref 0–50)
ANION GAP SERPL CALCULATED.3IONS-SCNC: 12 MMOL/L (ref 7–15)
AST SERPL W P-5'-P-CCNC: 25 U/L (ref 0–45)
BILIRUB SERPL-MCNC: 0.3 MG/DL
BUN SERPL-MCNC: 12.7 MG/DL (ref 6–20)
CALCIUM SERPL-MCNC: 9.7 MG/DL (ref 8.8–10.4)
CHLORIDE SERPL-SCNC: 104 MMOL/L (ref 98–107)
CREAT SERPL-MCNC: 0.76 MG/DL (ref 0.51–0.95)
EGFRCR SERPLBLD CKD-EPI 2021: >90 ML/MIN/1.73M2
ESTRADIOL SERPL-MCNC: 153 PG/ML
FERRITIN SERPL-MCNC: 94 NG/ML (ref 6–175)
GLUCOSE SERPL-MCNC: 102 MG/DL (ref 70–99)
HCO3 SERPL-SCNC: 24 MMOL/L (ref 22–29)
IRON BINDING CAPACITY (ROCHE): 310 UG/DL (ref 240–430)
IRON SATN MFR SERPL: 24 % (ref 15–46)
IRON SERPL-MCNC: 73 UG/DL (ref 37–145)
POTASSIUM SERPL-SCNC: 4.5 MMOL/L (ref 3.4–5.3)
PROT SERPL-MCNC: 7.4 G/DL (ref 6.4–8.3)
SODIUM SERPL-SCNC: 140 MMOL/L (ref 135–145)
TRANSFERRIN SERPL-MCNC: 261 MG/DL (ref 200–360)
TSH SERPL DL<=0.005 MIU/L-ACNC: 0.6 UIU/ML (ref 0.3–4.2)
VIT B12 SERPL-MCNC: 451 PG/ML (ref 232–1245)

## (undated) DEVICE — TUBING CONMED AIRSEAL SMOKE EVAC INSUFFLATION ASM-EVAC

## (undated) DEVICE — PREP CHLORAPREP 26ML TINTED ORANGE  260815

## (undated) DEVICE — ENDO TROCAR FIRST ENTRY KII FIOS Z-THRD 05X100MM CTF03

## (undated) DEVICE — SUCTION CANISTER MEDIVAC LINER 3000ML W/LID 65651-530

## (undated) DEVICE — SOL NACL 0.9% INJ 1000ML BAG 2B1324X

## (undated) DEVICE — GLOVE PROTEXIS W/NEU-THERA 8.0  2D73TE80

## (undated) DEVICE — SU VICRYL 0 UR-6 27" J603H

## (undated) DEVICE — ENDO TROCAR SLEEVE KII Z-THREADED 05X100MM CTS02

## (undated) DEVICE — SU VICRYL 4-0 PS-2 18" UND J496H

## (undated) DEVICE — PACK LAP CHOLE SLC15LCFSD

## (undated) DEVICE — DAVINCI HOT SHEARS TIP COVER  400180

## (undated) DEVICE — ENDO POUCH UNIV RETRIEVAL SYSTEM INZII 10MM CD001

## (undated) DEVICE — ENDO SCOPE WARMER LF TM500

## (undated) DEVICE — Device

## (undated) DEVICE — DAVINCI XI OBTURATOR BLADELESS 8MM 470359

## (undated) DEVICE — LINEN TOWEL PACK X5 5464

## (undated) DEVICE — KIT PATIENT POSITIONING PIGAZZI LATEX FREE 40580

## (undated) DEVICE — SOL WATER IRRIG 1000ML BOTTLE 2F7114

## (undated) DEVICE — ENDO TROCAR CONMED AIRSEAL BLADELESS 08X120MM IAS8-120LP

## (undated) DEVICE — DAVINCI XI DRAPE ARM 470015

## (undated) DEVICE — PACK DAVINCI GYN SMA15GDFS1

## (undated) DEVICE — BLADE CLIPPER 4406

## (undated) DEVICE — SYSTEM LAPAROVUE VISIBILITY LAPVUE10

## (undated) DEVICE — RETR ELEV / UTERINE MANIPULATOR V-CARE MED CUP 60-6085-201

## (undated) DEVICE — ESU GROUND PAD UNIVERSAL W/O CORD

## (undated) DEVICE — SUCTION IRR STRYKERFLOW II W/TIP 250-070-520

## (undated) DEVICE — SU WND CLOSURE VLOC 180 ABS 2-0 9" GS-21 VLOCL0345

## (undated) DEVICE — ESU GROUND PAD ADULT W/CORD E7507

## (undated) DEVICE — DAVINCI XI SEAL UNIVERSAL 5-8MM 470361

## (undated) DEVICE — GLOVE PROTEXIS BLUE W/NEU-THERA 7.5  2D73EB75

## (undated) DEVICE — STPL ENDO RELOAD ECHELON 45X2.0MM GREY ECR45M

## (undated) DEVICE — STPL POWERED ECHELON 45MM PSEE45A

## (undated) DEVICE — ENDO TROCAR FIRST ENTRY KII FIOS Z-THRD 12X100MM CTF73

## (undated) DEVICE — DAVINCI XI NDL DRIVER MEGA SUTURE CUT 8MM 470309

## (undated) DEVICE — GLOVE PROTEXIS W/NEU-THERA 7.5  2D73TE75

## (undated) DEVICE — DAVINCI XI HANDPIECE ESU VESSEL SEALER 8MM EXT 480422

## (undated) RX ORDER — HYDROMORPHONE HCL IN WATER/PF 6 MG/30 ML
PATIENT CONTROLLED ANALGESIA SYRINGE INTRAVENOUS
Status: DISPENSED
Start: 2022-10-05

## (undated) RX ORDER — PROPOFOL 10 MG/ML
INJECTION, EMULSION INTRAVENOUS
Status: DISPENSED
Start: 2021-08-23

## (undated) RX ORDER — BUPIVACAINE HYDROCHLORIDE AND EPINEPHRINE 5; 5 MG/ML; UG/ML
INJECTION, SOLUTION EPIDURAL; INTRACAUDAL; PERINEURAL
Status: DISPENSED
Start: 2021-08-23

## (undated) RX ORDER — ONDANSETRON 2 MG/ML
INJECTION INTRAMUSCULAR; INTRAVENOUS
Status: DISPENSED
Start: 2022-10-05

## (undated) RX ORDER — KETOROLAC TROMETHAMINE 30 MG/ML
INJECTION, SOLUTION INTRAMUSCULAR; INTRAVENOUS
Status: DISPENSED
Start: 2021-08-23

## (undated) RX ORDER — KETOROLAC TROMETHAMINE 30 MG/ML
INJECTION, SOLUTION INTRAMUSCULAR; INTRAVENOUS
Status: DISPENSED
Start: 2022-10-05

## (undated) RX ORDER — DEXAMETHASONE SODIUM PHOSPHATE 4 MG/ML
INJECTION, SOLUTION INTRA-ARTICULAR; INTRALESIONAL; INTRAMUSCULAR; INTRAVENOUS; SOFT TISSUE
Status: DISPENSED
Start: 2022-10-05

## (undated) RX ORDER — HYDROMORPHONE HCL IN WATER/PF 6 MG/30 ML
PATIENT CONTROLLED ANALGESIA SYRINGE INTRAVENOUS
Status: DISPENSED
Start: 2021-08-23

## (undated) RX ORDER — FENTANYL CITRATE 0.05 MG/ML
INJECTION, SOLUTION INTRAMUSCULAR; INTRAVENOUS
Status: DISPENSED
Start: 2021-08-23

## (undated) RX ORDER — MEPERIDINE HYDROCHLORIDE 25 MG/ML
INJECTION INTRAMUSCULAR; INTRAVENOUS; SUBCUTANEOUS
Status: DISPENSED
Start: 2021-08-23

## (undated) RX ORDER — LIDOCAINE HYDROCHLORIDE 20 MG/ML
INJECTION, SOLUTION EPIDURAL; INFILTRATION; INTRACAUDAL; PERINEURAL
Status: DISPENSED
Start: 2022-10-05

## (undated) RX ORDER — LIDOCAINE HYDROCHLORIDE 20 MG/ML
INJECTION, SOLUTION EPIDURAL; INFILTRATION; INTRACAUDAL; PERINEURAL
Status: DISPENSED
Start: 2021-08-23

## (undated) RX ORDER — FENTANYL CITRATE 50 UG/ML
INJECTION, SOLUTION INTRAMUSCULAR; INTRAVENOUS
Status: DISPENSED
Start: 2021-08-23

## (undated) RX ORDER — FENTANYL CITRATE 0.05 MG/ML
INJECTION, SOLUTION INTRAMUSCULAR; INTRAVENOUS
Status: DISPENSED
Start: 2022-10-05

## (undated) RX ORDER — FENTANYL CITRATE 50 UG/ML
INJECTION, SOLUTION INTRAMUSCULAR; INTRAVENOUS
Status: DISPENSED
Start: 2022-10-05

## (undated) RX ORDER — ACETAMINOPHEN 325 MG/1
TABLET ORAL
Status: DISPENSED
Start: 2022-10-05

## (undated) RX ORDER — CEFAZOLIN SODIUM/WATER 2 G/20 ML
SYRINGE (ML) INTRAVENOUS
Status: DISPENSED
Start: 2022-10-05

## (undated) RX ORDER — HYDROMORPHONE HYDROCHLORIDE 1 MG/ML
INJECTION, SOLUTION INTRAMUSCULAR; INTRAVENOUS; SUBCUTANEOUS
Status: DISPENSED
Start: 2022-10-05

## (undated) RX ORDER — BUPIVACAINE HYDROCHLORIDE 2.5 MG/ML
INJECTION, SOLUTION EPIDURAL; INFILTRATION; INTRACAUDAL
Status: DISPENSED
Start: 2022-10-05

## (undated) RX ORDER — PROPOFOL 10 MG/ML
INJECTION, EMULSION INTRAVENOUS
Status: DISPENSED
Start: 2022-10-05

## (undated) RX ORDER — HYDROXYZINE HYDROCHLORIDE 50 MG/ML
INJECTION, SOLUTION INTRAMUSCULAR
Status: DISPENSED
Start: 2022-10-05